# Patient Record
Sex: MALE | Race: WHITE | NOT HISPANIC OR LATINO | Employment: OTHER | URBAN - NONMETROPOLITAN AREA
[De-identification: names, ages, dates, MRNs, and addresses within clinical notes are randomized per-mention and may not be internally consistent; named-entity substitution may affect disease eponyms.]

---

## 2017-01-06 ENCOUNTER — OFFICE VISIT (OUTPATIENT)
Dept: FAMILY MEDICINE | Facility: OTHER | Age: 82
End: 2017-01-06
Payer: COMMERCIAL

## 2017-01-06 VITALS
TEMPERATURE: 96.6 F | HEIGHT: 66 IN | DIASTOLIC BLOOD PRESSURE: 72 MMHG | BODY MASS INDEX: 26.78 KG/M2 | HEART RATE: 83 BPM | SYSTOLIC BLOOD PRESSURE: 112 MMHG | OXYGEN SATURATION: 98 % | WEIGHT: 166.6 LBS

## 2017-01-06 DIAGNOSIS — J01.00 ACUTE NON-RECURRENT MAXILLARY SINUSITIS: Primary | ICD-10-CM

## 2017-01-06 DIAGNOSIS — J43.9 PULMONARY EMPHYSEMA, UNSPECIFIED EMPHYSEMA TYPE (H): ICD-10-CM

## 2017-01-06 DIAGNOSIS — I48.91 ATRIAL FIBRILLATION, UNSPECIFIED TYPE (H): ICD-10-CM

## 2017-01-06 DIAGNOSIS — I48.20 CHRONIC ATRIAL FIBRILLATION (H): Primary | ICD-10-CM

## 2017-01-06 PROCEDURE — 99214 OFFICE O/P EST MOD 30 MIN: CPT | Performed by: INTERNAL MEDICINE

## 2017-01-06 RX ORDER — CEFUROXIME AXETIL 500 MG/1
500 TABLET ORAL 2 TIMES DAILY
Qty: 20 TABLET | Refills: 0 | Status: SHIPPED | OUTPATIENT
Start: 2017-01-06 | End: 2017-08-07

## 2017-01-06 ASSESSMENT — PAIN SCALES - GENERAL: PAINLEVEL: MODERATE PAIN (5)

## 2017-01-06 NOTE — TELEPHONE ENCOUNTER
Diltiazem         Last Written Prescription Date: 11/17/16  Last Fill Quantity: 180, # refills: 0    Last Office Visit with G, P or [facility] prescribing provider:  1/6/17   Future Office Visit:      BP Readings from Last 3 Encounters:   01/06/17 112/72   08/29/16 133/82   05/20/16 112/76     ALT       34   7/25/2006  CHOL      192   5/20/2016  HDL       57   5/20/2016  LDL      112   5/20/2016  TRIG      113   5/20/2016  CHOLHDLRATIO      3.3   3/10/2015

## 2017-01-06 NOTE — NURSING NOTE
"Chief Complaint   Patient presents with     Pharyngitis     3 days       Initial /72 mmHg  Pulse 83  Temp(Src) 96.6  F (35.9  C) (Temporal)  Ht 5' 6\" (1.676 m)  Wt 166 lb 9.6 oz (75.569 kg)  BMI 26.90 kg/m2  SpO2 98% Estimated body mass index is 26.9 kg/(m^2) as calculated from the following:    Height as of this encounter: 5' 6\" (1.676 m).    Weight as of this encounter: 166 lb 9.6 oz (75.569 kg).  BP completed using cuff size: rolando MARROQUIN      "

## 2017-01-06 NOTE — PROGRESS NOTES
SUBJECTIVE:                                                    Jamal Francis is a 84 year old male who presents to clinic today for the following health issues:    RESPIRATORY SYMPTOMS      Duration: 3 days    Description  rhinorrhea, sore throat, cough and hoarse voice    Severity: mild    Accompanying signs and symptoms: None    History (predisposing factors):  none    Precipitating or alleviating factors: None    Therapies tried and outcome:  none       CHIEF COMPLAINT:    The patient is a pleasant 84-year-old gentleman who presents today with frontal and facial discomfort. He's had a sore throat, runny nose, and low-grade fever with hoarse voice for the last 3 days. He has been taking food and fluids adequately. He denies any difficulty sleeping. He has a history of chronic obstructive pulmonary disease which appears to be quite stable. He also has chronic atrial fibrillation and is on anticoagulation allowing several antibiotics. His heart rate is controlled with diltiazem but no syncope or near syncope.                         PAST, FAMILY,SOCIAL HISTORY:     Medical  History:   has a past medical history of Inguinal hernia without mention of obstruction or gangrene, unilateral or unspecified, (not specified as recurrent); Contact dermatitis and other eczema, due to unspecified cause; Varicella without mention of complication; Measles without mention of complication; Mumps without mention of complication; Injury, other and unspecified, unspecified site; Pneumonia in whooping cough(484.3); and Essential hypertension, benign (6/27/2016).     Surgical History:   has past surgical history that includes REPAIR ING HERNIA,5+Y/O,REDUCIBL (1993); cataract iol, rt/lt (7/27/2006); and REMV CATARACT EXTRACAP,INSERT LENS (08/24/06).     Social History:   reports that he has never smoked. He has never used smokeless tobacco. He reports that he drinks alcohol. He reports that he does not use illicit drugs.     Family  History:  family history includes CANCER in his father and paternal grandfather; Respiratory in his brother.            MEDICATIONS  Current Outpatient Prescriptions   Medication Sig Dispense Refill     cefUROXime (CEFTIN) 500 MG tablet Take 1 tablet (500 mg) by mouth 2 times daily 20 tablet 0     warfarin (COUMADIN) 5 MG tablet Take 5 mg on Monday, Wednesday, Friday and 7.5 mg all other days, or as directed by the coumadin clinic. 110 tablet 1     albuterol (PROAIR HFA, PROVENTIL HFA, VENTOLIN HFA) 108 (90 BASE) MCG/ACT inhaler Inhale 2 puffs into the lungs every 6 hours as needed for shortness of breath / dyspnea or wheezing 1 Inhaler 3     diltiazem (CARDIZEM) 30 MG tablet Take 1 tablet (30 mg) by mouth 2 times daily 180 tablet 0         --------------------------------------------------------------------------------------------------------------------                          REVIEW OF SYSTEMS:         HEART: Pt denies: chest pain, sensed arrythmia, syncope, tachy or bradyarrhythmia or excess edema.   LUNGS: Pt denies: cough,excess sputum, hemoptysis, or shortness of breath.   GI: Pt denies: nausea, vomitting, diarrhea, constipation, melena, or hematochezia.   NEURO: Pt denies: seizures, strokes, diplopia, weakness, paraesthesias, or paralysis.                          EXAMINATION:        Vital Signs:  B/P: 112/72, T: 96.6, P: 83, R: Data Unavailable, BMI: Body mass index is 26.9 kg/(m^2).   Constitutional: The patient appears to be in no acute distress. The patient appears to be adequately hydrated. No acute respiratory or hemodynamic distress is noted at this time.   LUNGS: Breath sounds are slightly decreased bilaterally, airflow is brisk, no intercostal retraction or stridor is noted. No coughing is noted during visit.   HEART:  Irregularly irregular without rubs, clicks, gallops. Systolic murmur consistent with mitral regurgitation is noted.. PMI is nondisplaced. Upstrokes are brisk. S1,S2 are heard.   GI:  Abdomen is soft, without rebound, guarding or tenderness. Bowel sounds are appropriate. No renal bruits are heard.    NEURO: Pt is alert and appropriate. No neurologic lateralization is noted. Cranial nerves 2-12 are intact. Peripheral sensory and motor function are grossly normal              ENT: Nasal mucosa is markedly edematous, thick yellow exudates are seen. Incomplete obstruction is present. Pharynx is clear of exudates, no significant cervical adenopathy is present. Tympanic membranes show mild infection without evidence of perforation. Thyroid is not nodular or enlarged. Positive sinus Is noted bilaterally.                        DECISION MAKING:       #1 acute non-recurrent maxillary sinusitis with upper respiratory infection   Recommend Ceftin 500 mg twice daily for 10 days   Avoid decongestants/atrial fibrillation   Tylenol as directed   Fluids  #2 chronic atrial fibrillation   Continue anticoagulation  #3 emphysema Stable   Continue albuterol as needed   Consider long-term therapy/long-term beta agonist with steroid if symptoms worsen    I have carefully explained the diagnosis and treatment options with the patient. The patient has displayed an understanding of the above, and all subsequent questions were answered.                            FOLLOW UP    I have asked the patient to make an appointment for follow up with me in 1 week if not markedly improved.      DO JENN Bentley    Portions of this note were produced using [employer]  Although every attempt at real-time proof reading has been made, occasional grammar/syntax errors may have been missed.

## 2017-01-06 NOTE — MR AVS SNAPSHOT
"              After Visit Summary   2017    Jamal Francis    MRN: [id number]           Patient Information     Date Of Birth          3/18/1932        Visit Information        Provider Department      2017 9:40 AM Catarino Landrum DO [facility]        Today's Diagnoses     Acute non-recurrent maxillary sinusitis    -  1     Atrial fibrillation, unspecified type (H)            Follow-ups after your visit        Who to contact     If you have questions or need follow up information about today's clinic visit or your schedule please contact [facility] directly at [phone].  Normal or non-critical lab and imaging results will be communicated to you by [facility], letter or phone within 4 business days after the clinic has received the results. If you do not hear from us within 7 days, please contact the clinic through [facility] or phone. If you have a critical or abnormal lab result, we will notify you by phone as soon as possible.  Submit refill requests through [employer] or call your pharmacy and they will forward the refill request to us. Please allow 3 business days for your refill to be completed.          Additional Information About Your Visit        MyChart Information     [employer] lets you send messages to your doctor, view your test results, renew your prescriptions, schedule appointments and more. To sign up, go to [email or web address] . Click on \"Log in\" on the left side of the screen, which will take you to the Welcome page. Then click on \"Sign up Now\" on the right side of the page.     You will be asked to enter the access code listed below, as well as some personal information. Please follow the directions to create your username and password.     Your access code is: [email or web address]  Expires: 2017 11:24 AM     Your access code will  in 90 days. If you need help or a new code, please call your [facility] or [phone].        Care EveryWhere ID     " "This is your Care EveryWhere ID. This could be used by other organizations to access your [place] medical records  [id number]        Your Vitals Were     Pulse Temperature Height BMI (Body Mass Index) Pulse Oximetry       83 96.6  F (35.9  C) (Temporal) 5' 6\" (1.676 m) 26.90 kg/m2 98%        Blood Pressure from Last 3 Encounters:   01/06/17 112/72   08/29/16 133/82   05/20/16 112/76    Weight from Last 3 Encounters:   01/06/17 166 lb 9.6 oz (75.569 kg)   05/20/16 165 lb 8 oz (75.07 kg)   07/21/15 169 lb (76.658 kg)              Today, you had the following     No orders found for display         Today's Medication Changes          These changes are accurate as of: 1/6/17 11:24 AM.  If you have any questions, ask your nurse or doctor.               Start taking these medicines.        Dose/Directions    cefUROXime 500 MG tablet   Commonly known as:  CEFTIN   Used for:  Acute non-recurrent maxillary sinusitis   Started by:  Catarino Landrum DO        Dose:  500 mg   Take 1 tablet (500 mg) by mouth 2 times daily   Quantity:  20 tablet   Refills:  0            Where to get your medicines      These medications were sent to [id number] - [place] - [address] [facility] [address]     Phone:  [phone]    - cefUROXime 500 MG tablet             Primary Care Provider Office Phone # Fax #    Catarino Landrum [phone]       [place] [address] [address]  [facility] [address]        Thank you!     Thank you for choosing [facility]  for your care. Our goal is always to provide you with excellent care. Hearing back from our patients is one way we can continue to improve our services. Please take a few minutes to complete the written survey that you may receive in the mail after your visit with us. Thank you!             Your Updated Medication List - Protect others around you: Learn how to safely use, store and throw away your " medicines at www.disposemymeds.org.          This list is accurate as of: 1/6/17 11:24 AM.  Always use your most recent med list.                   Brand Name Dispense Instructions for use    albuterol 108 (90 BASE) MCG/ACT Inhaler    PROAIR HFA/PROVENTIL HFA/VENTOLIN HFA    1 Inhaler    Inhale 2 puffs into the lungs every 6 hours as needed for shortness of breath / dyspnea or wheezing       cefUROXime 500 MG tablet    CEFTIN    20 tablet    Take 1 tablet (500 mg) by mouth 2 times daily       diltiazem 30 MG tablet    CARDIZEM    180 tablet    Take 1 tablet (30 mg) by mouth 2 times daily       warfarin 5 MG tablet    COUMADIN    110 tablet    Take 5 mg on Monday, Wednesday, Friday and 7.5 mg all other days, or as directed by the coumadin clinic.

## 2017-01-09 RX ORDER — DILTIAZEM HYDROCHLORIDE 30 MG/1
30 TABLET, FILM COATED ORAL 2 TIMES DAILY
Qty: 180 TABLET | Refills: 0 | Status: SHIPPED | OUTPATIENT
Start: 2017-01-09 | End: 2017-08-07

## 2017-01-09 NOTE — TELEPHONE ENCOUNTER
Routing refill request to provider for review/approval because:  Labs not current:  WHIT Ceja RN

## 2017-02-03 ENCOUNTER — TELEPHONE (OUTPATIENT)
Dept: FAMILY MEDICINE | Facility: OTHER | Age: 82
End: 2017-02-03

## 2017-02-03 ENCOUNTER — ANTICOAGULATION THERAPY VISIT (OUTPATIENT)
Dept: ANTICOAGULATION | Facility: CLINIC | Age: 82
End: 2017-02-03
Payer: COMMERCIAL

## 2017-02-03 VITALS — HEART RATE: 82 BPM | DIASTOLIC BLOOD PRESSURE: 74 MMHG | SYSTOLIC BLOOD PRESSURE: 129 MMHG

## 2017-02-03 DIAGNOSIS — Z79.01 LONG-TERM (CURRENT) USE OF ANTICOAGULANTS: Primary | ICD-10-CM

## 2017-02-03 DIAGNOSIS — I48.91 ATRIAL FIBRILLATION, UNSPECIFIED TYPE (H): ICD-10-CM

## 2017-02-03 DIAGNOSIS — N30.01 ACUTE CYSTITIS WITH HEMATURIA: Primary | ICD-10-CM

## 2017-02-03 LAB — INR POINT OF CARE: 2.6 (ref 0.86–1.14)

## 2017-02-03 PROCEDURE — 36416 COLLJ CAPILLARY BLOOD SPEC: CPT

## 2017-02-03 PROCEDURE — 99207 ZZC NO CHARGE NURSE ONLY: CPT

## 2017-02-03 PROCEDURE — 85610 PROTHROMBIN TIME: CPT | Mod: QW

## 2017-02-03 NOTE — TELEPHONE ENCOUNTER
Reason for Call: Request for an order or referral:    Order or referral being requested: Pts wife requesting orders for urine for pt (blood in urine).  Was advised Dr SNIDER is out today.    Date needed: as soon as possible    Has the patient been seen by the PCP for this problem? YES    Additional comments:     Phone number Patient can be reached at:  Home number on file [phone] (home)    Best Time:  any    Can we leave a detailed message on this number?  YES    Call taken on 2/3/2017 at 9:23 AM by Elizabeth Beltrán

## 2017-02-03 NOTE — PROGRESS NOTES
ANTICOAGULATION FOLLOW-UP CLINIC VISIT    Patient Name:  Jamal Francis  Date:  2/3/2017  Contact Type:  Face to Face    SUBJECTIVE:     Patient Findings     Positives No Problem Findings           OBJECTIVE    INR PROTIME   Date Value Ref Range Status   02/03/2017 2.6* 0.86 - 1.14 Final       ASSESSMENT / PLAN  INR assessment THER    Recheck INR In: 6 WEEKS    INR Location Clinic      Anticoagulation Summary as of 2/3/2017     INR goal 2.0-3.0   Selected INR 2.6 (2/3/2017)   Maintenance plan 5 mg (5 mg x 1) on Mon, Wed, Fri; 7.5 mg (5 mg x 1.5) all other days   Full instructions 5 mg on Mon, Wed, Fri; 7.5 mg all other days   Weekly total 45 mg   No change documented Livia Jaeger RN   Plan last modified Livia Jaeger, RN (3/30/2016)   Next INR check 3/17/2017   Target end date     Indications   Atrial fibrillation (H) [I48.91]  Long-term (current) use of anticoagulants [Z79.01] [Z79.01]         Anticoagulation Episode Summary     INR check location     Preferred lab     Send INR reminders to [facility]    Comments 5 mg tablets         Anticoagulation Care Providers     Provider Role Specialty Phone number    Krystle Goodwin RN Responsible              See the Encounter Report to view Anticoagulation Flowsheet and Dosing Calendar (Go to Encounters tab in chart review, and find the Anticoagulation Therapy Visit)    Dosage adjustment made based on physician directed care plan.    Livia Jaeger, BRIANA

## 2017-02-07 DIAGNOSIS — N30.01 ACUTE CYSTITIS WITH HEMATURIA: ICD-10-CM

## 2017-02-07 LAB
ALBUMIN UR-MCNC: NEGATIVE MG/DL
APPEARANCE UR: CLEAR
BACTERIA #/AREA URNS HPF: ABNORMAL /HPF
BILIRUB UR QL STRIP: NEGATIVE
COLOR UR AUTO: YELLOW
GLUCOSE UR STRIP-MCNC: NEGATIVE MG/DL
HGB UR QL STRIP: ABNORMAL
KETONES UR STRIP-MCNC: NEGATIVE MG/DL
LEUKOCYTE ESTERASE UR QL STRIP: NEGATIVE
MUCOUS THREADS #/AREA URNS LPF: PRESENT /LPF
NITRATE UR QL: NEGATIVE
NON-SQ EPI CELLS #/AREA URNS LPF: ABNORMAL /LPF
PH UR STRIP: 5.5 PH (ref 5–7)
RBC #/AREA URNS AUTO: ABNORMAL /HPF (ref 0–2)
SP GR UR STRIP: 1.02 (ref 1–1.03)
URN SPEC COLLECT METH UR: ABNORMAL
UROBILINOGEN UR STRIP-ACNC: 0.2 EU/DL (ref 0.2–1)
WBC #/AREA URNS AUTO: ABNORMAL /HPF (ref 0–2)

## 2017-02-07 PROCEDURE — 81001 URINALYSIS AUTO W/SCOPE: CPT | Performed by: INTERNAL MEDICINE

## 2017-03-02 ENCOUNTER — RADIANT APPOINTMENT (OUTPATIENT)
Dept: GENERAL RADIOLOGY | Facility: OTHER | Age: 82
End: 2017-03-02
Attending: FAMILY MEDICINE
Payer: COMMERCIAL

## 2017-03-02 ENCOUNTER — OFFICE VISIT (OUTPATIENT)
Dept: FAMILY MEDICINE | Facility: OTHER | Age: 82
End: 2017-03-02
Payer: COMMERCIAL

## 2017-03-02 VITALS
OXYGEN SATURATION: 99 % | WEIGHT: 168 LBS | SYSTOLIC BLOOD PRESSURE: 126 MMHG | HEART RATE: 88 BPM | BODY MASS INDEX: 27.12 KG/M2 | DIASTOLIC BLOOD PRESSURE: 66 MMHG | TEMPERATURE: 97.4 F | RESPIRATION RATE: 16 BRPM

## 2017-03-02 DIAGNOSIS — M79.621 PAIN OF RIGHT UPPER ARM: Primary | ICD-10-CM

## 2017-03-02 DIAGNOSIS — M79.621 PAIN OF RIGHT UPPER ARM: ICD-10-CM

## 2017-03-02 PROCEDURE — 99213 OFFICE O/P EST LOW 20 MIN: CPT | Performed by: FAMILY MEDICINE

## 2017-03-02 PROCEDURE — 73060 X-RAY EXAM OF HUMERUS: CPT | Mod: RT

## 2017-03-02 ASSESSMENT — PAIN SCALES - GENERAL: PAINLEVEL: MODERATE PAIN (4)

## 2017-03-02 NOTE — PROGRESS NOTES
SUBJECTIVE:                                                    Jamal Francis is a 84 year old male who presents to clinic today for the following health   Blood pressure 126/66, pulse 88, temperature 97.4  F (36.3  C), temperature source Temporal, resp. rate 16, weight 168 lb (76.2 kg), SpO2 99 %.    SUBJECTIVE:  Jamal Francis, a 84 year old male scheduled an appointment to discuss the following issues:  Pain of right upper arm    no obvious injury, but may have strained it lifting. Has improved slightly   On coumadin so cannot take nsaids   Past Medical History   Diagnosis Date     Contact dermatitis and other eczema, due to unspecified cause      Both temples     Essential hypertension, benign 6/27/2016     Inguinal hernia without mention of obstruction or gangrene, unilateral or unspecified, (not specified as recurrent)      Injury, other and unspecified, unspecified site      Rib fracture     Measles without mention of complication      Measles     Mumps without mention of complication      Mumps     Pneumonia in whooping cough(484.3)      Varicella without mention of complication      Chickenpox     Current Outpatient Prescriptions   Medication Sig Dispense Refill     diltiazem (CARDIZEM) 30 MG tablet Take 1 tablet (30 mg) by mouth 2 times daily 180 tablet 0     cefUROXime (CEFTIN) 500 MG tablet Take 1 tablet (500 mg) by mouth 2 times daily 20 tablet 0     warfarin (COUMADIN) 5 MG tablet Take 5 mg on Monday, Wednesday, Friday and 7.5 mg all other days, or as directed by the coumadin clinic. 110 tablet 1     albuterol (PROAIR HFA, PROVENTIL HFA, VENTOLIN HFA) 108 (90 BASE) MCG/ACT inhaler Inhale 2 puffs into the lungs every 6 hours as needed for shortness of breath / dyspnea or wheezing 1 Inhaler 3       EXAM:  /66 (BP Location: Right arm, Patient Position: Chair, Cuff Size: Adult Regular)  Pulse 88  Temp 97.4  F (36.3  C) (Temporal)  Resp 16  Wt 168 lb (76.2 kg)  SpO2 99%  BMI 27.12 kg/m2     HEART: sl  irregular rhythm, no murmurs.    EXTREMITIES: No cyanosis or edema. Peripheral pulses nl.some tenderness in the mid humeral area  NEURO no arm weakness     SKIN:  without rash on arm      IMPRESSION/PLAN:  Encounter Diagnosis   Name Primary?     Pain of right upper arm Yes     Orders Placed This Encounter     XR Humerus Right G/E 2 Views: nl      heat, rest, tylenol. Wd expect gradual improvement  cb if no imp in 2 weeks    Kennedy Villalba

## 2017-03-02 NOTE — MR AVS SNAPSHOT
"              After Visit Summary   3/2/2017    Jamal Francis    MRN: [id number]           Patient Information     Date Of Birth          3/18/1932        Visit Information        Provider Department      3/2/2017 10:00 AM Kennedy Villalba MD [facility]        Today's Diagnoses     Pain of right upper arm    -  1       Follow-ups after your visit        Who to contact     If you have questions or need follow up information about today's clinic visit or your schedule please contact [facility] directly at [phone].  Normal or non-critical lab and imaging results will be communicated to you by [facility] [facility], letter or phone within 4 business days after the clinic has received the results. If you do not hear from us within 7 days, please contact the clinic through [employer] or phone. If you have a critical or abnormal lab result, we will notify you by phone as soon as possible.  Submit refill requests through [employer] or call your pharmacy and they will forward the refill request to us. Please allow 3 business days for your refill to be completed.          Additional Information About Your Visit        MyChart Information     [employer] lets you send messages to your doctor, view your test results, renew your prescriptions, schedule appointments and more. To sign up, go to [email or web address] . Click on \"Log in\" on the left side of the screen, which will take you to the Welcome page. Then click on \"Sign up Now\" on the right side of the page.     You will be asked to enter the access code listed below, as well as some personal information. Please follow the directions to create your username and password.     Your access code is: [email or web address]  Expires: 2017 11:24 AM     Your access code will  in 90 days. If you need help or a new code, please call your [facility] or [phone].        Care EveryWhere ID     This is your Care EveryWhere ID. This could be used by other organizations " to access your [place] medical records  [id number]        Your Vitals Were     Pulse Temperature Respirations Pulse Oximetry BMI (Body Mass Index)       88 97.4  F (36.3  C) (Temporal) 16 99% 27.12 kg/m2        Blood Pressure from Last 3 Encounters:   03/02/17 126/66   02/03/17 129/74   01/06/17 112/72    Weight from Last 3 Encounters:   03/02/17 168 lb (76.2 kg)   01/06/17 166 lb 9.6 oz (75.6 kg)   05/20/16 165 lb 8 oz (75.1 kg)               Primary Care Provider Office Phone # Fax #    Catarino Landrum,  [phone]       [address] [place] [address]        Thank you!     Thank you for choosing [facility]  for your care. Our goal is always to provide you with excellent care. Hearing back from our patients is one way we can continue to improve our services. Please take a few minutes to complete the written survey that you may receive in the mail after your visit with us. Thank you!             Your Updated Medication List - Protect others around you: Learn how to safely use, store and throw away your medicines at www.disposemymeds.org.          This list is accurate as of: 3/2/17 11:38 AM.  Always use your most recent med list.                   Brand Name Dispense Instructions for use    albuterol 108 (90 BASE) MCG/ACT Inhaler    PROAIR HFA/PROVENTIL HFA/VENTOLIN HFA    1 Inhaler    Inhale 2 puffs into the lungs every 6 hours as needed for shortness of breath / dyspnea or wheezing       cefUROXime 500 MG tablet    CEFTIN    20 tablet    Take 1 tablet (500 mg) by mouth 2 times daily       diltiazem 30 MG tablet    CARDIZEM    180 tablet    Take 1 tablet (30 mg) by mouth 2 times daily       warfarin 5 MG tablet    COUMADIN    110 tablet    Take 5 mg on Monday, Wednesday, Friday and 7.5 mg all other days, or as directed by the coumadin clinic.

## 2017-03-21 ENCOUNTER — ANTICOAGULATION THERAPY VISIT (OUTPATIENT)
Dept: ANTICOAGULATION | Facility: CLINIC | Age: 82
End: 2017-03-21
Payer: COMMERCIAL

## 2017-03-21 VITALS — SYSTOLIC BLOOD PRESSURE: 123 MMHG | DIASTOLIC BLOOD PRESSURE: 84 MMHG | HEART RATE: 85 BPM

## 2017-03-21 DIAGNOSIS — I48.91 ATRIAL FIBRILLATION, UNSPECIFIED TYPE (H): ICD-10-CM

## 2017-03-21 DIAGNOSIS — Z79.01 LONG-TERM (CURRENT) USE OF ANTICOAGULANTS: ICD-10-CM

## 2017-03-21 DIAGNOSIS — I48.19 PERSISTENT ATRIAL FIBRILLATION (H): ICD-10-CM

## 2017-03-21 LAB — INR POINT OF CARE: 1.5 (ref 0.86–1.14)

## 2017-03-21 PROCEDURE — 36416 COLLJ CAPILLARY BLOOD SPEC: CPT

## 2017-03-21 PROCEDURE — 85610 PROTHROMBIN TIME: CPT | Mod: QW

## 2017-03-21 PROCEDURE — 99207 ZZC NO CHARGE NURSE ONLY: CPT

## 2017-03-21 RX ORDER — WARFARIN SODIUM 5 MG/1
TABLET ORAL
Qty: 110 TABLET | Refills: 1 | Status: SHIPPED | OUTPATIENT
Start: 2017-03-21 | End: 2017-09-01

## 2017-03-21 NOTE — MR AVS SNAPSHOT
Jamal AGATHA Francis   3/21/2017 3:00 PM   Anticoagulation Therapy Visit    Description:  85 year old male   Provider:  PH ANTI COAG   Department:  [facility]           INR as of 3/21/2017     Today's INR 1.5!      Anticoagulation Summary as of 3/21/2017     INR goal 2.0-3.0   Today's INR 1.5!   Full instructions 3/21: 10 mg; Otherwise 5 mg on Mon, Wed, Fri; 7.5 mg all other days   Next INR check 4/4/2017    Indications   Atrial fibrillation (H) [I48.91]  Long-term (current) use of anticoagulants [Z79.01] [Z79.01]         Contact Numbers     Clinic Number:         March 2017 Details    Sun Mon Tue Wed Thu Fri Sat        1               2               3               4                 5               6               7               8               9               10               11                 12               13               14               15               16               17               18                 19               20               21      10 mg   See details      22      5 mg         23      7.5 mg         24      5 mg         25      7.5 mg           26      7.5 mg         27      5 mg         28      7.5 mg         29      5 mg         30      7.5 mg         31      5 mg           Date Details   03/21 This INR check               How to take your warfarin dose     To take:  5 mg Take 1 of the 5 mg tablets.    To take:  7.5 mg Take 1.5 of the 5 mg tablets.    To take:  10 mg Take 2 of the 5 mg tablets.           April 2017 Details    Sun Mon Tue Wed Thu Fri Sat           1      7.5 mg           2      7.5 mg         3      5 mg         4            5               6               7               8                 9               10               11               12               13               14               15                 16               17               18               19               20               21               22                 23               24               25                26               27               28               29                 30                      Date Details   No additional details    Date of next INR:  4/4/2017         How to take your warfarin dose     To take:  5 mg Take 1 of the 5 mg tablets.    To take:  7.5 mg Take 1.5 of the 5 mg tablets.

## 2017-03-21 NOTE — PROGRESS NOTES
ANTICOAGULATION FOLLOW-UP CLINIC VISIT    Patient Name:  Jamal Francis  Date:  3/21/2017  Contact Type:  Face to Face    SUBJECTIVE:     Patient Findings     Positives Unexplained INR or factor level change           OBJECTIVE    INR Protime   Date Value Ref Range Status   03/21/2017 1.5 (A) 0.86 - 1.14 Final       ASSESSMENT / PLAN  INR assessment SUB    Recheck INR In: 2 WEEKS    INR Location Clinic      Anticoagulation Summary as of 3/21/2017     INR goal 2.0-3.0   Today's INR 1.5!   Maintenance plan 5 mg (5 mg x 1) on Mon, Wed, Fri; 7.5 mg (5 mg x 1.5) all other days   Full instructions 3/21: 10 mg; Otherwise 5 mg on Mon, Wed, Fri; 7.5 mg all other days   Weekly total 45 mg   Plan last modified Livia Jaeger RN (3/30/2016)   Next INR check 4/4/2017   Target end date     Indications   Atrial fibrillation (H) [I48.91]  Long-term (current) use of anticoagulants [Z79.01] [Z79.01]         Anticoagulation Episode Summary     INR check location     Preferred lab     Send INR reminders to [facility] POOL    Comments 5 mg tablets         Anticoagulation Care Providers     Provider Role Specialty Phone number    Krystle Goodwin RN Responsible              See the Encounter Report to view Anticoagulation Flowsheet and Dosing Calendar (Go to Encounters tab in chart review, and find the Anticoagulation Therapy Visit)    Dosage adjustment made based on physician directed care plan.    10 mg x1 then resume     Livia Jaeger, BRIANA

## 2017-04-07 ENCOUNTER — ANTICOAGULATION THERAPY VISIT (OUTPATIENT)
Dept: ANTICOAGULATION | Facility: CLINIC | Age: 82
End: 2017-04-07
Payer: COMMERCIAL

## 2017-04-07 VITALS — DIASTOLIC BLOOD PRESSURE: 82 MMHG | SYSTOLIC BLOOD PRESSURE: 115 MMHG | HEART RATE: 81 BPM

## 2017-04-07 DIAGNOSIS — I48.91 ATRIAL FIBRILLATION, UNSPECIFIED TYPE (H): ICD-10-CM

## 2017-04-07 DIAGNOSIS — Z79.01 LONG-TERM (CURRENT) USE OF ANTICOAGULANTS: ICD-10-CM

## 2017-04-07 LAB — INR POINT OF CARE: 2.9 (ref 0.86–1.14)

## 2017-04-07 PROCEDURE — 85610 PROTHROMBIN TIME: CPT | Mod: QW

## 2017-04-07 PROCEDURE — 36416 COLLJ CAPILLARY BLOOD SPEC: CPT

## 2017-04-07 PROCEDURE — 99207 ZZC NO CHARGE NURSE ONLY: CPT

## 2017-04-07 NOTE — PROGRESS NOTES
ANTICOAGULATION FOLLOW-UP CLINIC VISIT    Patient Name:  Jamal Francis  Date:  4/7/2017  Contact Type:  Face to Face    SUBJECTIVE:     Patient Findings     Positives No Problem Findings           OBJECTIVE    INR Protime   Date Value Ref Range Status   04/07/2017 2.9 (A) 0.86 - 1.14 Final       ASSESSMENT / PLAN  INR assessment THER    Recheck INR In: 6 WEEKS    INR Location Clinic      Anticoagulation Summary as of 4/7/2017     INR goal 2.0-3.0   Today's INR 2.9   Maintenance plan 5 mg (5 mg x 1) on Mon, Wed, Fri; 7.5 mg (5 mg x 1.5) all other days   Full instructions 5 mg on Mon, Wed, Fri; 7.5 mg all other days   Weekly total 45 mg   No change documented Livia Jaeger RN   Plan last modified Livia Jaeger RN (3/30/2016)   Next INR check 5/19/2017   Target end date     Indications   Atrial fibrillation (H) [I48.91]  Long-term (current) use of anticoagulants [Z79.01] [Z79.01]         Anticoagulation Episode Summary     INR check location     Preferred lab     Send INR reminders to [facility]    Comments 5 mg tablets         Anticoagulation Care Providers     Provider Role Specialty Phone number    Krystle Goodwin RN Responsible              See the Encounter Report to view Anticoagulation Flowsheet and Dosing Calendar (Go to Encounters tab in chart review, and find the Anticoagulation Therapy Visit)    Dosage adjustment made based on physician directed care plan.    Will call to make an appt in 6 weeks    Livia Jaeger, BRIANA

## 2017-04-07 NOTE — MR AVS SNAPSHOT
Jamal AGATHA Francis   4/7/2017 1:00 PM   Anticoagulation Therapy Visit    Description:  85 year old male   Provider:  [facility]   Department:  [facility]           INR as of 4/7/2017     Today's INR 2.9      Anticoagulation Summary as of 4/7/2017     INR goal 2.0-3.0   Today's INR 2.9   Full instructions 5 mg on Mon, Wed, Fri; 7.5 mg all other days   Next INR check 5/19/2017    Indications   Atrial fibrillation (H) [I48.91]  Long-term (current) use of anticoagulants [Z79.01] [Z79.01]         Contact Numbers     Clinic Number:         April 2017 Details    Sun Mon Tue Wed Thu Fri Sat           1                 2               3               4               5               6               7      5 mg   See details      8      7.5 mg           9      7.5 mg         10      5 mg         11      7.5 mg         12      5 mg         13      7.5 mg         14      5 mg         15      7.5 mg           16      7.5 mg         17      5 mg         18      7.5 mg         19      5 mg         20      7.5 mg         21      5 mg         22      7.5 mg           23      7.5 mg         24      5 mg         25      7.5 mg         26      5 mg         27      7.5 mg         28      5 mg         29      7.5 mg           30      7.5 mg                Date Details   04/07 This INR check               How to take your warfarin dose     To take:  5 mg Take 1 of the 5 mg tablets.    To take:  7.5 mg Take 1.5 of the 5 mg tablets.           May 2017 Details    Sun Mon Tue Wed Thu Fri Sat      1      5 mg         2      7.5 mg         3      5 mg         4      7.5 mg         5      5 mg         6      7.5 mg           7      7.5 mg         8      5 mg         9      7.5 mg         10      5 mg         11      7.5 mg         12      5 mg         13      7.5 mg           14      7.5 mg         15      5 mg         16      7.5 mg         17      5 mg         18      7.5 mg         19            20                 21               22                23               24               25               26               27                 28               29               30               31                   Date Details   No additional details    Date of next INR:  5/19/2017         How to take your warfarin dose     To take:  5 mg Take 1 of the 5 mg tablets.    To take:  7.5 mg Take 1.5 of the 5 mg tablets.

## 2017-05-19 ENCOUNTER — ANTICOAGULATION THERAPY VISIT (OUTPATIENT)
Dept: ANTICOAGULATION | Facility: CLINIC | Age: 82
End: 2017-05-19
Payer: COMMERCIAL

## 2017-05-19 VITALS — SYSTOLIC BLOOD PRESSURE: 143 MMHG | HEART RATE: 73 BPM | DIASTOLIC BLOOD PRESSURE: 80 MMHG

## 2017-05-19 DIAGNOSIS — I48.91 ATRIAL FIBRILLATION, UNSPECIFIED TYPE (H): ICD-10-CM

## 2017-05-19 DIAGNOSIS — Z79.01 LONG-TERM (CURRENT) USE OF ANTICOAGULANTS: ICD-10-CM

## 2017-05-19 DIAGNOSIS — I48.20 CHRONIC ATRIAL FIBRILLATION (H): ICD-10-CM

## 2017-05-19 LAB
ALT SERPL W P-5'-P-CCNC: 29 U/L (ref 0–70)
INR POINT OF CARE: 3.1 (ref 0.86–1.14)
INR POINT OF CARE: 3.1 (ref 0.86–1.14)

## 2017-05-19 PROCEDURE — 36416 COLLJ CAPILLARY BLOOD SPEC: CPT

## 2017-05-19 PROCEDURE — 85610 PROTHROMBIN TIME: CPT | Mod: QW

## 2017-05-19 PROCEDURE — 99207 ZZC NO CHARGE NURSE ONLY: CPT

## 2017-05-19 PROCEDURE — 84460 ALANINE AMINO (ALT) (SGPT): CPT | Performed by: INTERNAL MEDICINE

## 2017-05-19 NOTE — MR AVS SNAPSHOT
Jamal Francis   5/19/2017 1:30 PM   Anticoagulation Therapy Visit    Description:  85 year old male   Provider:  [facility]   Department:  [facility]           INR as of 5/19/2017     Today's INR 3.1!      Anticoagulation Summary as of 5/19/2017     INR goal 2.0-3.0   Today's INR 3.1!   Full instructions 5 mg on Mon, Wed, Fri; 7.5 mg all other days   Next INR check 6/30/2017    Indications   Atrial fibrillation (H) [I48.91]  Long-term (current) use of anticoagulants [Z79.01] [Z79.01]         Contact Numbers     Clinic Number:         May 2017 Details    Sun Mon Tue Wed Thu Fri Sat      1               2               3               4               5               6                 7               8               9               10               11               12               13                 14               15               16               17               18               19      5 mg   See details      20      7.5 mg           21      7.5 mg         22      5 mg         23      7.5 mg         24      5 mg         25      7.5 mg         26      5 mg         27      7.5 mg           28      7.5 mg         29      5 mg         30      7.5 mg         31      5 mg             Date Details   05/19 This INR check               How to take your warfarin dose     To take:  5 mg Take 1 of the 5 mg tablets.    To take:  7.5 mg Take 1.5 of the 5 mg tablets.           June 2017 Details    Sun Mon Tue Wed Thu Fri Sat         1      7.5 mg         2      5 mg         3      7.5 mg           4      7.5 mg         5      5 mg         6      7.5 mg         7      5 mg         8      7.5 mg         9      5 mg         10      7.5 mg           11      7.5 mg         12      5 mg         13      7.5 mg         14      5 mg         15      7.5 mg         16      5 mg         17      7.5 mg           18      7.5 mg         19      5 mg         20      7.5 mg         21      5 mg         22      7.5 mg         23      5  mg         24      7.5 mg           25      7.5 mg         26      5 mg         27      7.5 mg         28      5 mg         29      7.5 mg         30              Date Details   No additional details    Date of next INR:  6/30/2017         How to take your warfarin dose     To take:  5 mg Take 1 of the 5 mg tablets.    To take:  7.5 mg Take 1.5 of the 5 mg tablets.

## 2017-05-19 NOTE — PROGRESS NOTES
ANTICOAGULATION FOLLOW-UP CLINIC VISIT    Patient Name:  Jamal Francis  Date:  5/19/2017  Contact Type:  Face to Face    SUBJECTIVE:     Patient Findings     Positives No Problem Findings           OBJECTIVE    INR Protime   Date Value Ref Range Status   05/19/2017 3.1 (A) 0.86 - 1.14 Final       ASSESSMENT / PLAN  INR assessment THER    Recheck INR In: 6 WEEKS    INR Location Clinic      Anticoagulation Summary as of 5/19/2017     INR goal 2.0-3.0   Today's INR 3.1!   Maintenance plan 5 mg (5 mg x 1) on Mon, Wed, Fri; 7.5 mg (5 mg x 1.5) all other days   Full instructions 5 mg on Mon, Wed, Fri; 7.5 mg all other days   Weekly total 45 mg   No change documented Livia Jaeger RN   Plan last modified Livia Jaeger RN (3/30/2016)   Next INR check 6/30/2017   Target end date     Indications   Atrial fibrillation (H) [I48.91]  Long-term (current) use of anticoagulants [Z79.01] [Z79.01]         Anticoagulation Episode Summary     INR check location     Preferred lab     Send INR reminders to [facility]    Comments 5 mg tablets         Anticoagulation Care Providers     Provider Role Specialty Phone number    Krystle Goodwin RN Responsible              See the Encounter Report to view Anticoagulation Flowsheet and Dosing Calendar (Go to Encounters tab in chart review, and find the Anticoagulation Therapy Visit)    Dosage adjustment made based on physician directed care plan.    Livia Jaeger, BRIANA

## 2017-05-22 NOTE — PROGRESS NOTES
Please contact the patient and notify him of the following:  Liver test is normal.    Thank you.  DO JENN Bentley

## 2017-06-30 ENCOUNTER — TELEPHONE (OUTPATIENT)
Dept: ANTICOAGULATION | Facility: CLINIC | Age: 82
End: 2017-06-30

## 2017-06-30 ENCOUNTER — ANTICOAGULATION THERAPY VISIT (OUTPATIENT)
Dept: ANTICOAGULATION | Facility: CLINIC | Age: 82
End: 2017-06-30
Payer: COMMERCIAL

## 2017-06-30 VITALS — HEART RATE: 81 BPM | SYSTOLIC BLOOD PRESSURE: 132 MMHG | DIASTOLIC BLOOD PRESSURE: 66 MMHG

## 2017-06-30 DIAGNOSIS — Z79.01 LONG-TERM (CURRENT) USE OF ANTICOAGULANTS: ICD-10-CM

## 2017-06-30 DIAGNOSIS — I48.20 CHRONIC ATRIAL FIBRILLATION (H): Primary | ICD-10-CM

## 2017-06-30 DIAGNOSIS — I48.91 ATRIAL FIBRILLATION, UNSPECIFIED TYPE (H): ICD-10-CM

## 2017-06-30 LAB — INR POINT OF CARE: 2.6 (ref 0.86–1.14)

## 2017-06-30 PROCEDURE — 99207 ZZC NO CHARGE NURSE ONLY: CPT

## 2017-06-30 PROCEDURE — 36416 COLLJ CAPILLARY BLOOD SPEC: CPT

## 2017-06-30 PROCEDURE — 85610 PROTHROMBIN TIME: CPT | Mod: QW

## 2017-06-30 NOTE — PROGRESS NOTES
ANTICOAGULATION FOLLOW-UP CLINIC VISIT    Patient Name:  Jamal Francis  Date:  6/30/2017  Contact Type:  Face to Face    SUBJECTIVE:     Patient Findings     Positives No Problem Findings           OBJECTIVE    INR Protime   Date Value Ref Range Status   06/30/2017 2.6 (A) 0.86 - 1.14 Final       ASSESSMENT / PLAN  INR assessment THER    Recheck INR In: 6 WEEKS    INR Location Clinic      Anticoagulation Summary as of 6/30/2017     INR goal 2.0-3.0   Today's INR 2.6   Maintenance plan 5 mg (5 mg x 1) on Mon, Wed, Fri; 7.5 mg (5 mg x 1.5) all other days   Full instructions 5 mg on Mon, Wed, Fri; 7.5 mg all other days   Weekly total 45 mg   No change documented Livia Cassidy RN   Plan last modified Livia Cassidy RN (3/30/2016)   Next INR check 8/4/2017   Target end date     Indications   Atrial fibrillation (H) [I48.91]  Long-term (current) use of anticoagulants [Z79.01] [Z79.01]         Anticoagulation Episode Summary     INR check location     Preferred lab     Send INR reminders to [facility]    Comments 5 mg tablets         Anticoagulation Care Providers     Provider Role Specialty Phone number    Krystle Goodwin RN Responsible              See the Encounter Report to view Anticoagulation Flowsheet and Dosing Calendar (Go to Encounters tab in chart review, and find the Anticoagulation Therapy Visit)    Dosage adjustment made based on physician directed care plan.        Livia Cassidy, BRIANA

## 2017-06-30 NOTE — MR AVS SNAPSHOT
Jamal AGATHA Francis   6/30/2017 1:00 PM   Anticoagulation Therapy Visit    Description:  85 year old male   Provider:  [facility]   Department:  [facility]           INR as of 6/30/2017     Today's INR 2.6      Anticoagulation Summary as of 6/30/2017     INR goal 2.0-3.0   Today's INR 2.6   Full instructions 5 mg on Mon, Wed, Fri; 7.5 mg all other days   Next INR check 8/4/2017    Indications   Atrial fibrillation (H) [I48.91]  Long-term (current) use of anticoagulants [Z79.01] [Z79.01]         Contact Numbers     Clinic Number:         June 2017 Details    Sun Mon Tue Wed Thu Fri Sat         1               2               3                 4               5               6               7               8               9               10                 11               12               13               14               15               16               17                 18               19               20               21               22               23               24                 25               26               27               28               29               30      5 mg   See details        Date Details   06/30 This INR check               How to take your warfarin dose     To take:  5 mg Take 1 of the 5 mg tablets.           July 2017 Details    Sun Mon Tue Wed Thu Fri Sat           1      7.5 mg           2      7.5 mg         3      5 mg         4      7.5 mg         5      5 mg         6      7.5 mg         7      5 mg         8      7.5 mg           9      7.5 mg         10      5 mg         11      7.5 mg         12      5 mg         13      7.5 mg         14      5 mg         15      7.5 mg           16      7.5 mg         17      5 mg         18      7.5 mg         19      5 mg         20      7.5 mg         21      5 mg         22      7.5 mg           23      7.5 mg         24      5 mg         25      7.5 mg         26      5 mg         27      7.5 mg         28      5 mg         29       7.5 mg           30      7.5 mg         31      5 mg               Date Details   No additional details            How to take your warfarin dose     To take:  5 mg Take 1 of the 5 mg tablets.    To take:  7.5 mg Take 1.5 of the 5 mg tablets.           August 2017 Details    Sun Mon Tue Wed Thu Fri Sat       1      7.5 mg         2      5 mg         3      7.5 mg         4            5                 6               7               8               9               10               11               12                 13               14               15               16               17               18               19                 20               21               22               23               24               25               26                 27               28               29               30               31                  Date Details   No additional details    Date of next INR:  8/4/2017         How to take your warfarin dose     To take:  5 mg Take 1 of the 5 mg tablets.    To take:  7.5 mg Take 1.5 of the 5 mg tablets.

## 2017-06-30 NOTE — TELEPHONE ENCOUNTER
Please review and renew this patients INR referral, orders pending. Thank you!      Livia Cassidy RN

## 2017-08-07 ENCOUNTER — TELEPHONE (OUTPATIENT)
Dept: FAMILY MEDICINE | Facility: CLINIC | Age: 82
End: 2017-08-07

## 2017-08-07 ENCOUNTER — OFFICE VISIT (OUTPATIENT)
Dept: INTERNAL MEDICINE | Facility: CLINIC | Age: 82
End: 2017-08-07
Payer: COMMERCIAL

## 2017-08-07 VITALS
BODY MASS INDEX: 27.12 KG/M2 | OXYGEN SATURATION: 97 % | DIASTOLIC BLOOD PRESSURE: 52 MMHG | HEART RATE: 68 BPM | WEIGHT: 168 LBS | TEMPERATURE: 97 F | SYSTOLIC BLOOD PRESSURE: 82 MMHG

## 2017-08-07 DIAGNOSIS — I48.20 CHRONIC ATRIAL FIBRILLATION (H): ICD-10-CM

## 2017-08-07 DIAGNOSIS — I95.1 ORTHOSTATIC HYPOTENSION: ICD-10-CM

## 2017-08-07 DIAGNOSIS — J44.1 COPD EXACERBATION (H): Primary | ICD-10-CM

## 2017-08-07 PROCEDURE — 99214 OFFICE O/P EST MOD 30 MIN: CPT | Performed by: INTERNAL MEDICINE

## 2017-08-07 RX ORDER — DILTIAZEM HYDROCHLORIDE 30 MG/1
15 TABLET, FILM COATED ORAL 2 TIMES DAILY
Qty: 180 TABLET | Refills: 0 | COMMUNITY
Start: 2017-08-07 | End: 2017-09-01

## 2017-08-07 RX ORDER — IPRATROPIUM BROMIDE AND ALBUTEROL SULFATE 2.5; .5 MG/3ML; MG/3ML
1 SOLUTION RESPIRATORY (INHALATION) EVERY 6 HOURS PRN
Qty: 90 VIAL | Refills: 1 | Status: SHIPPED | OUTPATIENT
Start: 2017-08-07

## 2017-08-07 RX ORDER — CIPROFLOXACIN 500 MG/1
500 TABLET, FILM COATED ORAL 2 TIMES DAILY
Qty: 14 TABLET | Refills: 0 | Status: SHIPPED | OUTPATIENT
Start: 2017-08-07 | End: 2017-08-30

## 2017-08-07 ASSESSMENT — PAIN SCALES - GENERAL: PAINLEVEL: NO PAIN (0)

## 2017-08-07 NOTE — PROGRESS NOTES
SUBJECTIVE:                                                    Jamal Francis is a 85 year old male who presents to clinic today for the following health issues:    RESPIRATORY SYMPTOMS      Duration: last night    Description  rhinorrhea and cough    Severity: moderate    Accompanying signs and symptoms: None    History (predisposing factors):  COPD    Precipitating or alleviating factors: None    Therapies tried and outcome:  none        CHIEF COMPLAINT:    The patient is a pleasant 85-year-old gentleman who has a history of chronic atrial fibrillation. He is on a low-dose of diltiazem for rate control as well as anticoagulation. He notes that recently he has been having some lightheadedness and he stands up. His blood pressure today is systolically in the 80s. He's had no chest pain. He does have some increased shortness of breath as well as a slight cough which is productive of slight yellow sputum. He notes he has no orthopnea or signs of congestive heart failure. He does have a history of chronic obstructive pulmonary disease is currently having a slight exacerbation. He denies any hemoptysis.                         PAST, FAMILY,SOCIAL HISTORY:     Medical  History:   has a past medical history of Contact dermatitis and other eczema, due to unspecified cause; Essential hypertension, benign (6/27/2016); Inguinal hernia without mention of obstruction or gangrene, unilateral or unspecified, (not specified as recurrent); Injury, other and unspecified, unspecified site; Measles without mention of complication; Mumps without mention of complication; Pneumonia in whoop cough; and Varicella without mention of complication.     Surgical History:   has a past surgical history that includes REPAIR ING HERNIA,5+Y/O,REDUCIBL (1993); cataract iol, rt/lt (7/27/2006); and REMV CATARACT EXTRACAP,INSERT LENS (08/24/06).     Social History:   reports that he has never smoked. He has never used smokeless tobacco. He reports  that he drinks alcohol. He reports that he does not use illicit drugs.     Family History:  family history includes CANCER in his father and paternal grandfather; Respiratory in his brother.            MEDICATIONS  Current Outpatient Prescriptions   Medication Sig Dispense Refill     diltiazem (CARDIZEM) 30 MG tablet Take 0.5 tablets (15 mg) by mouth 2 times daily 180 tablet 0     order for DME Nebulizer machine and tubing/cup 1 Device 0     ipratropium - albuterol 0.5 mg/2.5 mg/3 mL (DUONEB) 0.5-2.5 (3) MG/3ML neb solution Take 1 vial (3 mLs) by nebulization every 6 hours as needed for shortness of breath / dyspnea or wheezing 90 vial 1     ciprofloxacin (CIPRO) 500 MG tablet Take 1 tablet (500 mg) by mouth 2 times daily 14 tablet 0     warfarin (COUMADIN) 5 MG tablet Take 5 mg on Monday, Wednesday, Friday and 7.5 mg all other days, or as directed by the coumadin clinic. 110 tablet 1     albuterol (PROAIR HFA, PROVENTIL HFA, VENTOLIN HFA) 108 (90 BASE) MCG/ACT inhaler Inhale 2 puffs into the lungs every 6 hours as needed for shortness of breath / dyspnea or wheezing 1 Inhaler 3     [DISCONTINUED] diltiazem (CARDIZEM) 30 MG tablet Take 1 tablet (30 mg) by mouth 2 times daily 180 tablet 0         --------------------------------------------------------------------------------------------------------------------                          REVIEW OF SYSTEMS:         LUNGS: Pt denies: hemoptysis, or shortness of breath at rest. He does have dyspnea with exertion..   HEART: Pt denies: chest pain, arrythmia, syncope, tachy or bradyarrhythmia or excess edema. Has had some lightheadedness and near syncope upon standing.   GI: Pt denies: nausea, vomitting, diarrhea, constipation, melena, or hematochezia.   NEURO: Pt denies: seizures, strokes, diplopia, weakness, paraesthesias, or paralysis.   SKIN: Pt denies: itching, rashes, discoloration, or specific lesions of concern. Denies recent hair loss.                           EXAMINATION:         BP (!) 82/52 (BP Location: Right arm, Patient Position: Chair, Cuff Size: Adult Regular)  Pulse 68  Temp 97  F (36.1  C) (Temporal)  Wt 168 lb (76.2 kg)  SpO2 97%  BMI 27.12 kg/m2   LUNGS: Decreased breath sounds bilaterally with scattered rhonchi are noted bilaterally, airflow is slowed, no intercostal retraction or stridor is noted. No coughing is noted during visit.   HEART:  Irregularly irregular without rubs, clicks, gallops, or murmurs. PMI is nondisplaced. Upstrokes are brisk. S1,S2 are heard.   GI: Abdomen is soft, without rebound, guarding or tenderness. Bowel sounds are appropriate. No renal bruits are heard.    NEURO: Pt is alert and appropriate. No neurologic lateralization is noted. Cranial nerves 2-12 are intact. Peripheral sensory and motor function are grossly normal   SKIN:  warm and dry. No erythema, or rashes are noted. No specific lesions of concern are noted.                 ENT: Nasal mucosa is edematous, yellow exudates are seen. Incomplete obstruction is present. Pharynx is clear of exudates, no significant cervical adenopathy is present. Tympanic membranes show no infection or evidence of perforation. Thyroid is not nodular or enlarged.                        DECISION MAKIN. Chronic atrial fibrillation (H)  Decrease diltiazem to 15 mg twice daily  - diltiazem (CARDIZEM) 30 MG tablet; Take 0.5 tablets (15 mg) by mouth 2 times daily  Dispense: 180 tablet; Refill: 0    2. COPD exacerbation (H)  Start DuoNeb  An antibiotic/Cipro/follow INR closely  - order for DME; Nebulizer machine and tubing/cup  Dispense: 1 Device; Refill: 0  - ipratropium - albuterol 0.5 mg/2.5 mg/3 mL (DUONEB) 0.5-2.5 (3) MG/3ML neb solution; Take 1 vial (3 mLs) by nebulization every 6 hours as needed for shortness of breath / dyspnea or wheezing  Dispense: 90 vial; Refill: 1  - ciprofloxacin (CIPRO) 500 MG tablet; Take 1 tablet (500 mg) by mouth 2 times daily  Dispense: 14 tablet;  Refill: 0    3. Orthostatic hypotension  As above  Follow blood pressure at home                               FOLLOW UP    I have asked the patient to make an appointment for follow up with me in 2 or 3 weeks for follow-up/physical            I have carefully explained the diagnosis and treatment options with the patient. The patient has displayed an understanding of the above, and all subsequent questions were answered.             DO JENN Bentley    Portions of this note were produced using [employer]  Although every attempt at real-time proof reading has been made, occasional grammar/syntax errors may have been missed.

## 2017-08-07 NOTE — NURSING NOTE
"Chief Complaint   Patient presents with     Cough       Initial BP (!) 82/52 (BP Location: Right arm, Patient Position: Chair, Cuff Size: Adult Regular)  Pulse 68  Temp 97  F (36.1  C) (Temporal)  Wt 168 lb (76.2 kg)  SpO2 97%  BMI 27.12 kg/m2 Estimated body mass index is 27.12 kg/(m^2) as calculated from the following:    Height as of 1/6/17: 5' 6\" (1.676 m).    Weight as of this encounter: 168 lb (76.2 kg).  Medication Reconciliation: complete  Blanca MARROQUIN    "

## 2017-08-07 NOTE — MR AVS SNAPSHOT
"              After Visit Summary   2017    Jamal Francis    MRN: [id number]           Patient Information     Date Of Birth          3/18/1932        Visit Information        Provider Department      2017 1:20 PM Catarino Landrum DO [facility]        Today's Diagnoses     COPD exacerbation (H)    -  1    Chronic atrial fibrillation (H)        Orthostatic hypotension           Follow-ups after your visit        Who to contact     If you have questions or need follow up information about today's clinic visit or your schedule please contact [facility] directly at [phone].  Normal or non-critical lab and imaging results will be communicated to you by [employer], letter or phone within 4 business days after the clinic has received the results. If you do not hear from us within 7 days, please contact the clinic through [employer] or phone. If you have a critical or abnormal lab result, we will notify you by phone as soon as possible.  Submit refill requests through [employer] or call your pharmacy and they will forward the refill request to us. Please allow 3 business days for your refill to be completed.          Additional Information About Your Visit        MyChart Information     Native lets you send messages to your doctor, view your test results, renew your prescriptions, schedule appointments and more. To sign up, go to [email or web address] . Click on \"Log in\" on the left side of the screen, which will take you to the Welcome page. Then click on \"Sign up Now\" on the right side of the page.     You will be asked to enter the access code listed below, as well as some personal information. Please follow the directions to create your username and password.     Your access code is: [email or web address]  Expires: 2017  1:35 PM     Your access code will  in 90 days. If you need help or a new code, please call your [facility] or [phone].        Care " EveryWhere ID     This is your Care EveryWhere ID. This could be used by other organizations to access your [place] medical records  [id number]        Your Vitals Were     Pulse Temperature Pulse Oximetry BMI (Body Mass Index)          68 97  F (36.1  C) (Temporal) 97% 27.12 kg/m2         Blood Pressure from Last 3 Encounters:   08/07/17 (!) 82/52   06/30/17 132/66   05/19/17 143/80    Weight from Last 3 Encounters:   08/07/17 168 lb (76.2 kg)   03/02/17 168 lb (76.2 kg)   01/06/17 166 lb 9.6 oz (75.6 kg)              Today, you had the following     No orders found for display         Today's Medication Changes          These changes are accurate as of: 8/7/17  1:35 PM.  If you have any questions, ask your nurse or doctor.               Start taking these medicines.        Dose/Directions    ciprofloxacin 500 MG tablet   Commonly known as:  CIPRO   Used for:  COPD exacerbation (H)   Started by:  Catarino Landrum DO        Dose:  500 mg   Take 1 tablet (500 mg) by mouth 2 times daily   Quantity:  14 tablet   Refills:  0       ipratropium - albuterol 0.5 mg/2.5 mg/3 mL 0.5-2.5 (3) MG/3ML neb solution   Commonly known as:  DUONEB   Used for:  COPD exacerbation (H)   Started by:  Catarino Landrum DO        Dose:  1 vial   Take 1 vial (3 mLs) by nebulization every 6 hours as needed for shortness of breath / dyspnea or wheezing   Quantity:  90 vial   Refills:  1       order for DME   Used for:  COPD exacerbation (H)   Started by:  Catarino Landrum DO        Nebulizer machine and tubing/cup   Quantity:  1 Device   Refills:  0         These medicines have changed or have updated prescriptions.        Dose/Directions    CARDIZEM 30 MG tablet   This may have changed:  how much to take   Used for:  Chronic atrial fibrillation (H)   Generic drug:  diltiazem   Changed by:  Catarino Landrum DO        Dose:  15 mg   Take 0.5 tablets (15 mg) by mouth 2 times daily   Quantity:  180  tablet   Refills:  0            Where to get your medicines      These medications were sent to [id number] - [place], [place] - [address]   [address], [facility] [address]     Phone:  [phone]     ciprofloxacin 500 MG tablet    ipratropium - albuterol 0.5 mg/2.5 mg/3 mL 0.5-2.5 (3) MG/3ML neb solution         Some of these will need a paper prescription and others can be bought over the counter.  Ask your nurse if you have questions.     Bring a paper prescription for each of these medications     order for DME                Primary Care Provider Office Phone # Fax #    Catarino Lawrence Lucita,  [phone]       [facility] [address]   [place] [facility] [place] [address]        Equal Access to Services     ERNESTO GAMBINO : Hadii bhavin aparicio hadasho Soomaali, waaxda luqadaha, qaybta kaalmada adeegyada, tiesha avendano . So [facility] [phone].    ATENCIÓN: Si habla español, tiene a singleton disposición servicios gratuitos de asistencia lingüística. Llame al [phone].    We comply with applicable federal civil rights laws and [place] laws. We do not discriminate on the basis of race, color, national origin, age, disability sex, sexual orientation or gender identity.            Thank you!     Thank you for choosing [facility]  for your care. Our goal is always to provide you with excellent care. Hearing back from our patients is one way we can continue to improve our services. Please take a few minutes to complete the written survey that you may receive in the mail after your visit with us. Thank you!             Your Updated Medication List - Protect others around you: Learn how to safely use, store and throw away your medicines at www.disposemymeds.org.          This list is accurate as of: 8/7/17  1:35 PM.  Always use your most recent med list.                   Brand Name Dispense Instructions for use Diagnosis    albuterol 108 (90 BASE)  MCG/ACT Inhaler    PROAIR HFA/PROVENTIL HFA/VENTOLIN HFA    1 Inhaler    Inhale 2 puffs into the lungs every 6 hours as needed for shortness of breath / dyspnea or wheezing    Pulmonary emphysema, unspecified emphysema type (H), Chronic atrial fibrillation (H)       CARDIZEM 30 MG tablet   Generic drug:  diltiazem     180 tablet    Take 0.5 tablets (15 mg) by mouth 2 times daily    Chronic atrial fibrillation (H)       ciprofloxacin 500 MG tablet    CIPRO    14 tablet    Take 1 tablet (500 mg) by mouth 2 times daily    COPD exacerbation (H)       ipratropium - albuterol 0.5 mg/2.5 mg/3 mL 0.5-2.5 (3) MG/3ML neb solution    DUONEB    90 vial    Take 1 vial (3 mLs) by nebulization every 6 hours as needed for shortness of breath / dyspnea or wheezing    COPD exacerbation (H)       order for DME     1 Device    Nebulizer machine and tubing/cup    COPD exacerbation (H)       warfarin 5 MG tablet    COUMADIN    110 tablet    Take 5 mg on Monday, Wednesday, Friday and 7.5 mg all other days, or as directed by the coumadin clinic.    Persistent atrial fibrillation (H)

## 2017-08-07 NOTE — TELEPHONE ENCOUNTER
Reason for Call:  Same Day Appointment, Requested Provider:  Catarino Landrum D.O.    PCP: Catarino Landrum    Reason for visit: coughed all night long    Duration of symptoms: ongoing    Have you been treated for this in the past? Yes    Additional comments: would like him seen today, declined next available opening.    Can we leave a detailed message on this number? YES    Phone number patient can be reached at: Home number on file [phone] (home)    Best Time: any    Call taken on 8/7/2017 at 10:09 AM by Diogenes Casey

## 2017-08-09 ENCOUNTER — TELEPHONE (OUTPATIENT)
Dept: INTERNAL MEDICINE | Facility: CLINIC | Age: 82
End: 2017-08-09

## 2017-08-09 NOTE — TELEPHONE ENCOUNTER
Reason for Call:  Other call back    Detailed comments: Pt was prescribed a nebulizer on Monday, they would like a nurse to call them back to make sure they are using it properly     Phone Number Patient can be reached at: Home number on file [phone] (home)    Best Time: anytime    Can we leave a detailed message on this number? YES    Call taken on 8/9/2017 at 1:42 PM by Roma Guan

## 2017-08-09 NOTE — TELEPHONE ENCOUNTER
Mariama asking if Jamal should breath thorough his mouth or nose while doing the nebulizer treatments.   Rn advised to breath in through mouth and it doesn't matter if he exhales through nose or mouth.    Mariama verified understanding.  Treva Guan RN

## 2017-08-22 ENCOUNTER — ANTICOAGULATION THERAPY VISIT (OUTPATIENT)
Dept: ANTICOAGULATION | Facility: CLINIC | Age: 82
End: 2017-08-22
Payer: COMMERCIAL

## 2017-08-22 VITALS — SYSTOLIC BLOOD PRESSURE: 124 MMHG | DIASTOLIC BLOOD PRESSURE: 69 MMHG | HEART RATE: 82 BPM

## 2017-08-22 DIAGNOSIS — Z79.01 LONG-TERM (CURRENT) USE OF ANTICOAGULANTS: ICD-10-CM

## 2017-08-22 DIAGNOSIS — I48.91 ATRIAL FIBRILLATION, UNSPECIFIED TYPE (H): ICD-10-CM

## 2017-08-22 LAB — INR POINT OF CARE: 3.2 (ref 0.86–1.14)

## 2017-08-22 PROCEDURE — 36416 COLLJ CAPILLARY BLOOD SPEC: CPT

## 2017-08-22 PROCEDURE — 85610 PROTHROMBIN TIME: CPT | Mod: QW

## 2017-08-22 PROCEDURE — 99207 ZZC NO CHARGE NURSE ONLY: CPT

## 2017-08-22 NOTE — MR AVS SNAPSHOT
Jamal AGATHA Francis   8/22/2017 1:00 PM   Anticoagulation Therapy Visit    Description:  85 year old male   Provider:  PH ANTI COAG   Department:  [facility]           INR as of 8/22/2017     Today's INR 3.2!      Anticoagulation Summary as of 8/22/2017     INR goal 2.0-3.0   Today's INR 3.2!   Full instructions 5 mg on Mon, Wed, Fri; 7.5 mg all other days   Next INR check 9/19/2017    Indications   Atrial fibrillation (H) [I48.91]  Long-term (current) use of anticoagulants [Z79.01] [Z79.01]         Contact Numbers     Clinic Number:         August 2017 Details    Sun Mon Tue Wed Thu Fri Sat       1               2               3               4               5                 6               7               8               9               10               11               12                 13               14               15               16               17               18               19                 20               21               22      7.5 mg   See details      23      5 mg         24      7.5 mg         25      5 mg         26      7.5 mg           27      7.5 mg         28      5 mg         29      7.5 mg         30      5 mg         31      7.5 mg            Date Details   08/22 This INR check               How to take your warfarin dose     To take:  5 mg Take 1 of the 5 mg tablets.    To take:  7.5 mg Take 1.5 of the 5 mg tablets.           September 2017 Details    Sun Mon Tue Wed Thu Fri Sat          1      5 mg         2      7.5 mg           3      7.5 mg         4      5 mg         5      7.5 mg         6      5 mg         7      7.5 mg         8      5 mg         9      7.5 mg           10      7.5 mg         11      5 mg         12      7.5 mg         13      5 mg         14      7.5 mg         15      5 mg         16      7.5 mg           17      7.5 mg         18      5 mg         19            20               21               22               23                 24               25                26               27               28               29               30                Date Details   No additional details    Date of next INR:  9/19/2017         How to take your warfarin dose     To take:  5 mg Take 1 of the 5 mg tablets.    To take:  7.5 mg Take 1.5 of the 5 mg tablets.

## 2017-08-22 NOTE — PROGRESS NOTES
ANTICOAGULATION FOLLOW-UP CLINIC VISIT    Patient Name:  Jamal Francis  Date:  8/22/2017  Contact Type:  Face to Face    SUBJECTIVE:     Patient Findings     Positives Change in medications (done with cipro last Tuesday)           OBJECTIVE    INR Protime   Date Value Ref Range Status   08/22/2017 3.2 (A) 0.86 - 1.14 Final       ASSESSMENT / PLAN  INR assessment THER    Recheck INR In: 4 WEEKS    INR Location Clinic      Anticoagulation Summary as of 8/22/2017     INR goal 2.0-3.0   Today's INR 3.2!   Maintenance plan 5 mg (5 mg x 1) on Mon, Wed, Fri; 7.5 mg (5 mg x 1.5) all other days   Full instructions 5 mg on Mon, Wed, Fri; 7.5 mg all other days   Weekly total 45 mg   No change documented Livia Cassidy RN   Plan last modified Livia Cassidy RN (3/30/2016)   Next INR check 9/19/2017   Target end date     Indications   Atrial fibrillation (H) [I48.91]  Long-term (current) use of anticoagulants [Z79.01] [Z79.01]         Anticoagulation Episode Summary     INR check location     Preferred lab     Send INR reminders to [facility] POOL    Comments 5 mg tablets         Anticoagulation Care Providers     Provider Role Specialty Phone number    Krystle Goodwin RN Responsible              See the Encounter Report to view Anticoagulation Flowsheet and Dosing Calendar (Go to Encounters tab in chart review, and find the Anticoagulation Therapy Visit)    Dosage adjustment made based on physician directed care plan.      Livia Cassidy RN

## 2017-08-30 ENCOUNTER — OFFICE VISIT (OUTPATIENT)
Dept: FAMILY MEDICINE | Facility: OTHER | Age: 82
End: 2017-08-30
Payer: COMMERCIAL

## 2017-08-30 VITALS
SYSTOLIC BLOOD PRESSURE: 112 MMHG | WEIGHT: 164.5 LBS | DIASTOLIC BLOOD PRESSURE: 80 MMHG | OXYGEN SATURATION: 96 % | BODY MASS INDEX: 26.55 KG/M2 | TEMPERATURE: 96.9 F | HEART RATE: 60 BPM

## 2017-08-30 DIAGNOSIS — J43.9 PULMONARY EMPHYSEMA, UNSPECIFIED EMPHYSEMA TYPE (H): ICD-10-CM

## 2017-08-30 DIAGNOSIS — R31.9 HEMATURIA: ICD-10-CM

## 2017-08-30 DIAGNOSIS — Z13.6 CARDIOVASCULAR SCREENING; LDL GOAL LESS THAN 160: Primary | ICD-10-CM

## 2017-08-30 DIAGNOSIS — I10 ESSENTIAL HYPERTENSION, BENIGN: ICD-10-CM

## 2017-08-30 DIAGNOSIS — I48.20 CHRONIC ATRIAL FIBRILLATION (H): ICD-10-CM

## 2017-08-30 LAB
ALBUMIN UR-MCNC: NEGATIVE MG/DL
APPEARANCE UR: CLEAR
BILIRUB UR QL STRIP: NEGATIVE
CHOLEST SERPL-MCNC: 197 MG/DL
COLOR UR AUTO: YELLOW
GLUCOSE UR STRIP-MCNC: NEGATIVE MG/DL
HDLC SERPL-MCNC: 61 MG/DL
HGB UR QL STRIP: ABNORMAL
KETONES UR STRIP-MCNC: NEGATIVE MG/DL
LDLC SERPL CALC-MCNC: 121 MG/DL
LEUKOCYTE ESTERASE UR QL STRIP: NEGATIVE
NITRATE UR QL: NEGATIVE
NONHDLC SERPL-MCNC: 136 MG/DL
PH UR STRIP: 7.5 PH (ref 5–7)
RBC #/AREA URNS AUTO: NORMAL /HPF
SOURCE: ABNORMAL
SP GR UR STRIP: 1.01 (ref 1–1.03)
TRIGL SERPL-MCNC: 77 MG/DL
UROBILINOGEN UR STRIP-ACNC: 0.2 EU/DL (ref 0.2–1)
WBC #/AREA URNS AUTO: NORMAL /HPF

## 2017-08-30 PROCEDURE — 81001 URINALYSIS AUTO W/SCOPE: CPT | Performed by: INTERNAL MEDICINE

## 2017-08-30 PROCEDURE — 80061 LIPID PANEL: CPT | Performed by: INTERNAL MEDICINE

## 2017-08-30 PROCEDURE — 99213 OFFICE O/P EST LOW 20 MIN: CPT | Performed by: INTERNAL MEDICINE

## 2017-08-30 PROCEDURE — 36415 COLL VENOUS BLD VENIPUNCTURE: CPT | Performed by: INTERNAL MEDICINE

## 2017-08-30 ASSESSMENT — PAIN SCALES - GENERAL: PAINLEVEL: NO PAIN (0)

## 2017-08-30 NOTE — NURSING NOTE
"Chief Complaint   Patient presents with     Cough     f/u       Initial /80 (BP Location: Left arm, Patient Position: Chair, Cuff Size: Adult Regular)  Pulse 60  Temp 96.9  F (36.1  C) (Temporal)  Wt 164 lb 8 oz (74.6 kg)  SpO2 96%  BMI 26.55 kg/m2 Estimated body mass index is 26.55 kg/(m^2) as calculated from the following:    Height as of 1/6/17: 5' 6\" (1.676 m).    Weight as of this encounter: 164 lb 8 oz (74.6 kg).  Medication Reconciliation: complete  Blanca MARROQUIN    "

## 2017-08-30 NOTE — MR AVS SNAPSHOT
"              After Visit Summary   2017    Jamal Francis    MRN: [id number]           Patient Information     Date Of Birth          3/18/1932        Visit Information        Provider Department      2017 8:40 AM Catarino Landrum DO [facility]        Today's Diagnoses     CARDIOVASCULAR SCREENING; LDL GOAL LESS THAN 160    -  1    Pulmonary emphysema, unspecified emphysema type (H)        Chronic atrial fibrillation (H)        Essential hypertension, benign        Hematuria           Follow-ups after your visit        Who to contact     If you have questions or need follow up information about today's clinic visit or your schedule please contact [facility] directly at [phone].  Normal or non-critical lab and imaging results will be communicated to you by MyChart, letter or phone within 4 business days after the clinic has received the results. If you do not hear from us within 7 days, please contact the clinic through MyChart or phone. If you have a critical or abnormal lab result, we will notify you by phone as soon as possible.  Submit refill requests through [employer] or call your pharmacy and they will forward the refill request to us. Please allow 3 business days for your refill to be completed.          Additional Information About Your Visit        MyChart Information     [employer] lets you send messages to your doctor, view your test results, renew your prescriptions, schedule appointments and more. To sign up, go to [email or web address] . Click on \"Log in\" on the left side of the screen, which will take you to the Welcome page. Then click on \"Sign up Now\" on the right side of the page.     You will be asked to enter the access code listed below, as well as some personal information. Please follow the directions to create your username and password.     Your access code is: [email or web address]  Expires: 2017  1:35 PM     Your access code will  in 90 " days. If you need help or a new code, please call your [place] clinic or [phone].        Care EveryWhere ID     This is your Care EveryWhere ID. This could be used by other organizations to access your [place] medical records  [id number]        Your Vitals Were     Pulse Temperature Pulse Oximetry BMI (Body Mass Index)          60 96.9  F (36.1  C) (Temporal) 96% 26.55 kg/m2         Blood Pressure from Last 3 Encounters:   08/30/17 112/80   08/22/17 124/69   08/07/17 (!) 82/52    Weight from Last 3 Encounters:   08/30/17 164 lb 8 oz (74.6 kg)   08/07/17 168 lb (76.2 kg)   03/02/17 168 lb (76.2 kg)              We Performed the Following     *UA reflex to Microscopic and Culture ([place] and [facility] (except [place] and [place])     Lipid panel reflex to direct LDL     Urine Microscopic        Primary Care Provider Office Phone # Fax #    Mofdhlfu Howard Landrum,  [phone]       [address] [place] [address]        Equal Access to Services     [facility] CenterPARTHA : Hadii aad ku hadasho Soomaali, waaxda luqadaha, qaybta kaalmada adedmitry, tiesha avendano . So [facility] [phone].    ATENCIÓN: Si habla español, tiene a singleton disposición servicios gratuitos de asistencia lingüística. LlJoint [facility] [phone].    We comply with applicable federal civil rights laws and [place] laws. We do not discriminate on the basis of race, color, national origin, age, disability sex, sexual orientation or gender identity.            Thank you!     Thank you for choosing [facility]  for your care. Our goal is always to provide you with excellent care. Hearing back from our patients is one way we can continue to improve our services. Please take a few minutes to complete the written survey that you may receive in the mail after your visit with us. Thank you!             Your Updated Medication List - Protect others around you: Learn how to safely use, store and  throw away your medicines at www.disposemymeds.org.          This list is accurate as of: 8/30/17  9:26 AM.  Always use your most recent med list.                   Brand Name Dispense Instructions for use Diagnosis    albuterol 108 (90 BASE) MCG/ACT Inhaler    PROAIR HFA/PROVENTIL HFA/VENTOLIN HFA    1 Inhaler    Inhale 2 puffs into the lungs every 6 hours as needed for shortness of breath / dyspnea or wheezing    Pulmonary emphysema, unspecified emphysema type (H), Chronic atrial fibrillation (H)       CARDIZEM 30 MG tablet   Generic drug:  diltiazem     180 tablet    Take 0.5 tablets (15 mg) by mouth 2 times daily    Chronic atrial fibrillation (H)       ipratropium - albuterol 0.5 mg/2.5 mg/3 mL 0.5-2.5 (3) MG/3ML neb solution    DUONEB    90 vial    Take 1 vial (3 mLs) by nebulization every 6 hours as needed for shortness of breath / dyspnea or wheezing    COPD exacerbation (H)       order for DME     1 Device    Nebulizer machine and tubing/cup    COPD exacerbation (H)       warfarin 5 MG tablet    COUMADIN    110 tablet    Take 5 mg on Monday, Wednesday, Friday and 7.5 mg all other days, or as directed by the coumadin clinic.    Persistent atrial fibrillation (H)

## 2017-08-30 NOTE — PROGRESS NOTES
Chief Complaint   Patient presents with     Cough     f/u     CHIEF COMPLAINT:    The patient is a pleasant 85-year-old gentleman who presents today for follow-up of his recent exacerbation of COPD. He was treated with ciprofloxacin in addition to his normal nebulizer therapy and has had substantial improvement. The cough has now essentially resolved, he is still a little short of breath with exertion but does not have any excessive sputum production. He previously had a little bit of minimal hematuria and did bring in a urine sample today for recheck. He's had no flank pain or suprapubic tenderness. His orthostatic hypotension has improved as well. We have decreased the dosage of his diltiazem. He does have chronic atrial fibrillation and is on chronic anticoagulation for this. This probably augmenting his modest hematuria.                         PAST, FAMILY,SOCIAL HISTORY:     Medical  History:   has a past medical history of Contact dermatitis and other eczema, due to unspecified cause; Essential hypertension, benign (6/27/2016); Inguinal hernia without mention of obstruction or gangrene, unilateral or unspecified, (not specified as recurrent); Injury, other and unspecified, unspecified site; Measles without mention of complication; Mumps without mention of complication; Pneumonia in whoop cough; and Varicella without mention of complication.     Surgical History:   has a past surgical history that includes REPAIR ING HERNIA,5+Y/O,REDUCIBL (1993); cataract iol, rt/lt (7/27/2006); and REMV CATARACT EXTRACAP,INSERT LENS (08/24/06).     Social History:   reports that he has never smoked. He has never used smokeless tobacco. He reports that he drinks alcohol. He reports that he does not use illicit drugs.     Family History:  family history includes CANCER in his father and paternal grandfather; Respiratory in his brother.            MEDICATIONS  Current Outpatient Prescriptions   Medication Sig Dispense Refill      diltiazem (CARDIZEM) 30 MG tablet Take 0.5 tablets (15 mg) by mouth 2 times daily 180 tablet 0     order for DME Nebulizer machine and tubing/cup 1 Device 0     ipratropium - albuterol 0.5 mg/2.5 mg/3 mL (DUONEB) 0.5-2.5 (3) MG/3ML neb solution Take 1 vial (3 mLs) by nebulization every 6 hours as needed for shortness of breath / dyspnea or wheezing 90 vial 1     warfarin (COUMADIN) 5 MG tablet Take 5 mg on Monday, Wednesday, Friday and 7.5 mg all other days, or as directed by the coumadin clinic. 110 tablet 1     albuterol (PROAIR HFA, PROVENTIL HFA, VENTOLIN HFA) 108 (90 BASE) MCG/ACT inhaler Inhale 2 puffs into the lungs every 6 hours as needed for shortness of breath / dyspnea or wheezing 1 Inhaler 3         --------------------------------------------------------------------------------------------------------------------                          REVIEW OF SYSTEMS:         LUNGS: Pt denies: cough,excess sputum, hemoptysis, or shortness of breath at rest. He does have some mild dyspnea with exertion.   HEART: Pt denies: chest pain, sensed arrythmia, syncope, tachy or bradyarrhythmia or excess edema.   GI: Pt denies: nausea, vomitting, diarrhea, constipation, melena, or hematochezia.   NEURO: Pt denies: seizures, strokes, diplopia, weakness, paraesthesias, or paralysis.   SKIN: Pt denies: itching, rashes, discoloration, or specific lesions of concern. Denies recent hair loss.   ENT: Pt denies: sore throat, significant nasal congestion, epistaxis, difficulty swallowing, or changes in base-line hearing.                          EXAMINATION:         /80 (BP Location: Left arm, Patient Position: Chair, Cuff Size: Adult Regular)  Pulse 60  Temp 96.9  F (36.1  C) (Temporal)  Wt 164 lb 8 oz (74.6 kg)  SpO2 96%  BMI 26.55 kg/m2   Constitutional: The patient appears to be in no acute distress. The patient appears to be adequately hydrated. No acute respiratory or hemodynamic distress is noted at this  time.   LUNGS: Breath sounds are much clearer but are still slightly decreased bilaterally, airflow is slowed, no intercostal retraction or stridor is noted. No coughing is noted during visit.   HEART:  Irregularly irregular without rubs, clicks, gallops, or murmurs. PMI is nondisplaced. Upstrokes are brisk. S1,S2 are heard.   GI: Abdomen is soft, without rebound, guarding or tenderness. Bowel sounds are appropriate. No renal bruits are heard.    NEURO: Pt is alert and appropriate. No neurologic lateralization is noted. Cranial nerves 2-12 are intact. Peripheral sensory and motor function are grossly normal  .            ENT: Nasal mucosa is moist, no exudates are seen. No obstruction is present. Pharynx is clear of exudates, no significant cervical adenopathy is present. Tympanic membranes show no infection or evidence of perforation. Thyroid is not nodular or enlarged.                        DECISION MAKIN. CARDIOVASCULAR SCREENING; LDL GOAL LESS THAN 160  Done    2. Pulmonary emphysema, unspecified emphysema type (H)  Markedly improved  Recommend pneumonia and influenza vaccination. Patient refused at the last moment  3. Chronic atrial fibrillation (H)  Continue anticoagulation  Continue rate control with lower dose of diltiazem  4. Essential hypertension, benign  Well controlled on decreased dosage of diltiazem  - Lipid panel reflex to direct LDL    5. Hematuria  Recheck urinalysis, if grossly positive will recommend ultrasound/CT of the kidneys  - *UA reflex to Microscopic and Culture ([place] and [facility] (except [place] and [place])                               FOLLOW UP    I have asked the patient to make an appointment for follow up with me in 3 months or as needed        I have carefully explained the diagnosis and treatment options with the patient. The patient has displayed an understanding of the above, and all subsequent questions were answered.         Catarino Landrum DO  FACOI    Portions of this note were produced using [employer]  Although every attempt at real-time proof reading has been made, occasional grammar/syntax errors may have been missed.

## 2017-09-01 DIAGNOSIS — I48.20 CHRONIC ATRIAL FIBRILLATION (H): Primary | ICD-10-CM

## 2017-09-01 DIAGNOSIS — I48.19 PERSISTENT ATRIAL FIBRILLATION (H): Primary | ICD-10-CM

## 2017-09-01 NOTE — TELEPHONE ENCOUNTER
Diltiazem           Last Written Prescription Date: 8/7/17  Last Fill Quantity: 180, # refills: 0    Last Office Visit with G, P or [facility] prescribing provider:  8/30/17   Future Office Visit:      BP Readings from Last 3 Encounters:   08/30/17 112/80   08/22/17 124/69   08/07/17 (!) 82/52     Lab Results   Component Value Date    ALT 29 05/19/2017     Lab Results   Component Value Date    CHOL 197 08/30/2017     Lab Results   Component Value Date    HDL 61 08/30/2017     Lab Results   Component Value Date     08/30/2017     Lab Results   Component Value Date    TRIG 77 08/30/2017     Lab Results   Component Value Date    CHOLHDLRATIO 3.3 03/10/2015

## 2017-09-01 NOTE — TELEPHONE ENCOUNTER
Warfarin      Last Written Prescription Date:3/21/17  Last Fill Qty: 110, # refills: 1  Last Office Visit with G, P or [facility] prescribing provider: 8/30/17       Date and Result of Last PT/INR:   Lab Results   Component Value Date    INR 3.2 08/22/2017    INR 2.6 06/30/2017    INR 2.42 05/20/2016    INR 1.35 12/05/2005

## 2017-09-05 RX ORDER — WARFARIN SODIUM 5 MG/1
TABLET ORAL
Qty: 110 TABLET | Refills: 1 | Status: SHIPPED | OUTPATIENT
Start: 2017-09-05 | End: 2017-10-09

## 2017-09-06 RX ORDER — DILTIAZEM HYDROCHLORIDE 30 MG/1
15 TABLET, FILM COATED ORAL 2 TIMES DAILY
Qty: 180 TABLET | Refills: 3 | Status: SHIPPED | OUTPATIENT
Start: 2017-09-06 | End: 2018-11-19

## 2017-09-11 NOTE — PROGRESS NOTES
Please contact the patient and notify him of the following:  The cholesterol is somewhat elevated with an LDL of 121.  We will discuss statin medications and diet at the patient's next visit.  The urine sample was normal.  Thank you.  DO JENN Bentley

## 2017-09-26 ENCOUNTER — ANTICOAGULATION THERAPY VISIT (OUTPATIENT)
Dept: ANTICOAGULATION | Facility: CLINIC | Age: 82
End: 2017-09-26
Payer: COMMERCIAL

## 2017-09-26 VITALS — SYSTOLIC BLOOD PRESSURE: 110 MMHG | DIASTOLIC BLOOD PRESSURE: 73 MMHG | HEART RATE: 83 BPM

## 2017-09-26 DIAGNOSIS — Z79.01 LONG-TERM (CURRENT) USE OF ANTICOAGULANTS: ICD-10-CM

## 2017-09-26 DIAGNOSIS — I48.20 CHRONIC ATRIAL FIBRILLATION (H): ICD-10-CM

## 2017-09-26 LAB — INR POINT OF CARE: 3.6 (ref 0.86–1.14)

## 2017-09-26 PROCEDURE — 36416 COLLJ CAPILLARY BLOOD SPEC: CPT

## 2017-09-26 PROCEDURE — 99207 ZZC NO CHARGE NURSE ONLY: CPT

## 2017-09-26 PROCEDURE — 85610 PROTHROMBIN TIME: CPT | Mod: QW

## 2017-09-26 NOTE — PROGRESS NOTES
ANTICOAGULATION FOLLOW-UP CLINIC VISIT    Patient Name:  Jamal Francis  Date:  9/26/2017  Contact Type:  Face to Face    SUBJECTIVE:     Patient Findings     Positives OTC meds (pt taking serrapeptase which may be why INR is up)           OBJECTIVE    INR Protime   Date Value Ref Range Status   09/26/2017 3.6 (A) 0.86 - 1.14 Final       ASSESSMENT / PLAN  INR assessment SUPRA    Recheck INR In: 2 WEEKS    INR Location Clinic      Anticoagulation Summary as of 9/26/2017     INR goal 2.0-3.0   Today's INR 3.6!   Maintenance plan 7.5 mg (5 mg x 1.5) on Fri; 5 mg (5 mg x 1) all other days   Full instructions 9/27: 2.5 mg; Otherwise 7.5 mg on Fri; 5 mg all other days   Weekly total 37.5 mg   Plan last modified Livia Cassidy, RN (9/26/2017)   Next INR check    Target end date     Indications   Atrial fibrillation (H) [I48.91]  Long-term (current) use of anticoagulants [Z79.01] [Z79.01]         Anticoagulation Episode Summary     INR check location     Preferred lab     Send INR reminders to [facility] POOL    Comments 5 mg tablets         Anticoagulation Care Providers     Provider Role Specialty Phone number    Krystle Goodwin, RN Responsible              See the Encounter Report to view Anticoagulation Flowsheet and Dosing Calendar (Go to Encounters tab in chart review, and find the Anticoagulation Therapy Visit)    Dosage adjustment made based on physician directed care plan.    2.5 mg tomorrow (already took today's dose), then decrease to 7.5 mg Mon, Fri and 5 mg ROW    Livia Cassidy, BRIANA

## 2017-09-26 NOTE — MR AVS SNAPSHOT
Jamal Francis   9/26/2017 3:00 PM   Anticoagulation Therapy Visit    Description:  85 year old male   Provider:  [facility]   Department:  [facility]           INR as of 9/26/2017     Today's INR 3.6!      Anticoagulation Summary as of 9/26/2017     INR goal 2.0-3.0   Today's INR 3.6!   Full instructions 9/27: 2.5 mg; Otherwise 7.5 mg on Fri; 5 mg all other days   Next INR check 10/10/2017    Indications   Atrial fibrillation (H) [I48.91]  Long-term (current) use of anticoagulants [Z79.01] [Z79.01]         Your next Anticoagulation Clinic appointment(s)     Oct 10, 2017  3:00 PM CDT   [facility] with [facility]   [facility]    [address] [place] [id number]   [phone]              Contact Numbers     Clinic Number:         September 2017 Details    Sun Mon Tue Wed Thu Fri Sat          1               2                 3               4               5               6               7               8               9                 10               11               12               13               14               15               16                 17               18               19               20               21               22               23                 24               25               26      5 mg   See details      27      2.5 mg         28      5 mg         29      7.5 mg         30      5 mg          Date Details   09/26 This INR check               How to take your warfarin dose     To take:  2.5 mg Take 0.5 of a 5 mg tablet.    To take:  5 mg Take 1 of the 5 mg tablets.    To take:  7.5 mg Take 1.5 of the 5 mg tablets.           October 2017 Details    Sun Mon Tue Wed Thu Fri Sat     1      5 mg         2      5 mg         3      5 mg         4      5 mg         5      5 mg         6      7.5 mg         7      5 mg           8      5 mg         9      5 mg         10            11               12                13               14                 15               16               17               18               19               20               21                 22               23               24               25               26               27               28                 29               30               31                    Date Details   No additional details    Date of next INR:  10/10/2017         How to take your warfarin dose     To take:  5 mg Take 1 of the 5 mg tablets.    To take:  7.5 mg Take 1.5 of the 5 mg tablets.

## 2017-10-09 ENCOUNTER — ANTICOAGULATION THERAPY VISIT (OUTPATIENT)
Dept: ANTICOAGULATION | Facility: CLINIC | Age: 82
End: 2017-10-09
Payer: COMMERCIAL

## 2017-10-09 VITALS — DIASTOLIC BLOOD PRESSURE: 75 MMHG | SYSTOLIC BLOOD PRESSURE: 130 MMHG | HEART RATE: 79 BPM

## 2017-10-09 DIAGNOSIS — I48.20 CHRONIC ATRIAL FIBRILLATION (H): ICD-10-CM

## 2017-10-09 DIAGNOSIS — I48.19 PERSISTENT ATRIAL FIBRILLATION (H): ICD-10-CM

## 2017-10-09 DIAGNOSIS — Z79.01 LONG-TERM (CURRENT) USE OF ANTICOAGULANTS: ICD-10-CM

## 2017-10-09 LAB — INR POINT OF CARE: 2.2 (ref 0.86–1.14)

## 2017-10-09 PROCEDURE — 36416 COLLJ CAPILLARY BLOOD SPEC: CPT

## 2017-10-09 PROCEDURE — 85610 PROTHROMBIN TIME: CPT | Mod: QW

## 2017-10-09 PROCEDURE — 99207 ZZC NO CHARGE NURSE ONLY: CPT

## 2017-10-09 RX ORDER — WARFARIN SODIUM 5 MG/1
TABLET ORAL
Qty: 110 TABLET | Refills: 1 | COMMUNITY
Start: 2017-10-09 | End: 2017-11-07

## 2017-10-09 NOTE — PROGRESS NOTES
ANTICOAGULATION FOLLOW-UP CLINIC VISIT    Patient Name:  Jamal Francis  Date:  10/9/2017  Contact Type:  Face to Face    SUBJECTIVE:     Patient Findings     Positives OTC meds (still taking irasema paptase)           OBJECTIVE    INR Protime   Date Value Ref Range Status   10/09/2017 2.2 (A) 0.86 - 1.14 Final       ASSESSMENT / PLAN  INR assessment THER    Recheck INR In: 4 WEEKS    INR Location Clinic      Anticoagulation Summary as of 10/9/2017     INR goal 2.0-3.0   Today's INR 2.2   Maintenance plan 7.5 mg (5 mg x 1.5) on Fri; 5 mg (5 mg x 1) all other days   Full instructions 7.5 mg on Fri; 5 mg all other days   Weekly total 37.5 mg   No change documented Livia Cassidy RN   Plan last modified Livia Cassidy RN (9/26/2017)   Next INR check 11/6/2017   Target end date     Indications   Atrial fibrillation (H) [I48.91]  Long-term (current) use of anticoagulants [Z79.01] [Z79.01]         Anticoagulation Episode Summary     INR check location     Preferred lab     Send INR reminders to [facility] POOL    Comments 5 mg tablets         Anticoagulation Care Providers     Provider Role Specialty Phone number    Krystle Goodwin, RN Responsible              See the Encounter Report to view Anticoagulation Flowsheet and Dosing Calendar (Go to Encounters tab in chart review, and find the Anticoagulation Therapy Visit)    Dosage adjustment made based on physician directed care plan.    Livia Cassidy RN

## 2017-11-07 ENCOUNTER — ANTICOAGULATION THERAPY VISIT (OUTPATIENT)
Dept: ANTICOAGULATION | Facility: CLINIC | Age: 82
End: 2017-11-07
Payer: COMMERCIAL

## 2017-11-07 DIAGNOSIS — Z79.01 LONG-TERM (CURRENT) USE OF ANTICOAGULANTS: ICD-10-CM

## 2017-11-07 DIAGNOSIS — I48.19 PERSISTENT ATRIAL FIBRILLATION (H): ICD-10-CM

## 2017-11-07 DIAGNOSIS — I48.20 CHRONIC ATRIAL FIBRILLATION (H): ICD-10-CM

## 2017-11-07 LAB — INR POINT OF CARE: 1.7 (ref 0.86–1.14)

## 2017-11-07 PROCEDURE — 36416 COLLJ CAPILLARY BLOOD SPEC: CPT

## 2017-11-07 PROCEDURE — 85610 PROTHROMBIN TIME: CPT | Mod: QW

## 2017-11-07 PROCEDURE — 99207 ZZC NO CHARGE NURSE ONLY: CPT

## 2017-11-07 RX ORDER — WARFARIN SODIUM 5 MG/1
TABLET ORAL
Qty: 110 TABLET | Refills: 1 | COMMUNITY
Start: 2017-11-07 | End: 2018-02-12

## 2017-11-07 NOTE — PROGRESS NOTES
ANTICOAGULATION FOLLOW-UP CLINIC VISIT    Patient Name:  Jamal Francis  Date:  11/7/2017  Contact Type:  Face to Face    SUBJECTIVE:     Patient Findings     Positives Unexplained INR or factor level change           OBJECTIVE    INR Protime   Date Value Ref Range Status   11/07/2017 1.7 (A) 0.86 - 1.14 Final       ASSESSMENT / PLAN  INR assessment SUB    Recheck INR In: 2 WEEKS    INR Location Clinic      Anticoagulation Summary as of 11/7/2017     INR goal 2.0-3.0   Today's INR 1.7!   Maintenance plan 7.5 mg (5 mg x 1.5) on Mon, Fri; 5 mg (5 mg x 1) all other days   Full instructions 11/7: 10 mg; Otherwise 7.5 mg on Mon, Fri; 5 mg all other days   Weekly total 40 mg   Plan last modified Livia Cassidy RN (11/7/2017)   Next INR check 11/21/2017   Target end date     Indications   Atrial fibrillation (H) [I48.91]  Long-term (current) use of anticoagulants [Z79.01] [Z79.01]         Anticoagulation Episode Summary     INR check location     Preferred lab     Send INR reminders to [facility] POOL    Comments 5 mg tablets         Anticoagulation Care Providers     Provider Role Specialty Phone number    Krystle Goodwin RN Responsible              See the Encounter Report to view Anticoagulation Flowsheet and Dosing Calendar (Go to Encounters tab in chart review, and find the Anticoagulation Therapy Visit)    Dosage adjustment made based on physician directed care plan.      Livia Cassidy, BRIANA

## 2017-11-07 NOTE — MR AVS SNAPSHOT
Jamal AGATHA Francis   11/7/2017 1:15 PM   Anticoagulation Therapy Visit    Description:  85 year old male   Provider:  PH ANTI COAG   Department:  [facility]           INR as of 11/7/2017     Today's INR 1.7!      Anticoagulation Summary as of 11/7/2017     INR goal 2.0-3.0   Today's INR 1.7!   Full instructions 11/7: 10 mg; Otherwise 7.5 mg on Mon, Fri; 5 mg all other days   Next INR check 11/21/2017    Indications   Atrial fibrillation (H) [I48.91]  Long-term (current) use of anticoagulants [Z79.01] [Z79.01]         Contact Numbers     Clinic Number:         November 2017 Details    Sun Mon Tue Wed Thu Fri Sat        1               2               3               4                 5               6               7      10 mg   See details      8      5 mg         9      5 mg         10      7.5 mg         11      5 mg           12      5 mg         13      7.5 mg         14      5 mg         15      5 mg         16      5 mg         17      7.5 mg         18      5 mg           19      5 mg         20      7.5 mg         21            22               23               24               25                 26               27               28               29               30                  Date Details   11/07 This INR check       Date of next INR:  11/21/2017         How to take your warfarin dose     To take:  5 mg Take 1 of the 5 mg tablets.    To take:  7.5 mg Take 1.5 of the 5 mg tablets.    To take:  10 mg Take 2 of the 5 mg tablets.

## 2017-12-05 ENCOUNTER — ANTICOAGULATION THERAPY VISIT (OUTPATIENT)
Dept: ANTICOAGULATION | Facility: CLINIC | Age: 82
End: 2017-12-05
Payer: COMMERCIAL

## 2017-12-05 VITALS — DIASTOLIC BLOOD PRESSURE: 89 MMHG | HEART RATE: 76 BPM | SYSTOLIC BLOOD PRESSURE: 144 MMHG

## 2017-12-05 DIAGNOSIS — Z79.01 LONG-TERM (CURRENT) USE OF ANTICOAGULANTS: ICD-10-CM

## 2017-12-05 DIAGNOSIS — I48.20 CHRONIC ATRIAL FIBRILLATION (H): ICD-10-CM

## 2017-12-05 LAB — INR POINT OF CARE: 2 (ref 0.86–1.14)

## 2017-12-05 PROCEDURE — 99207 ZZC NO CHARGE NURSE ONLY: CPT

## 2017-12-05 PROCEDURE — 36416 COLLJ CAPILLARY BLOOD SPEC: CPT

## 2017-12-05 PROCEDURE — 85610 PROTHROMBIN TIME: CPT | Mod: QW

## 2017-12-05 NOTE — MR AVS SNAPSHOT
Jamal SNIDER Penny   12/5/2017 1:45 PM   Anticoagulation Therapy Visit    Description:  85 year old male   Provider:  PH ANTI COAG   Department:  [facility]           INR as of 12/5/2017     Today's INR 2.0      Anticoagulation Summary as of 12/5/2017     INR goal 2.0-3.0   Today's INR 2.0   Full instructions 7.5 mg on Mon, Fri; 5 mg all other days   Next INR check 1/2/2018    Indications   Atrial fibrillation (H) [I48.91]  Long-term (current) use of anticoagulants [Z79.01] [Z79.01]         Contact Numbers     Clinic Number:         December 2017 Details    Sun Mon Tue Wed Thu Fri Sat          1               2                 3               4               5      5 mg   See details      6      5 mg         7      5 mg         8      7.5 mg         9      5 mg           10      5 mg         11      7.5 mg         12      5 mg         13      5 mg         14      5 mg         15      7.5 mg         16      5 mg           17      5 mg         18      7.5 mg         19      5 mg         20      5 mg         21      5 mg         22      7.5 mg         23      5 mg           24      5 mg         25      7.5 mg         26      5 mg         27      5 mg         28      5 mg         29      7.5 mg         30      5 mg           31      5 mg                Date Details   12/05 This INR check               How to take your warfarin dose     To take:  5 mg Take 1 of the 5 mg tablets.    To take:  7.5 mg Take 1.5 of the 5 mg tablets.           January 2018 Details    Sun Mon Tue Wed Thu Fri Sat      1      7.5 mg         2            3               4               5               6                 7               8               9               10               11               12               13                 14               15               16               17               18               19               20                 21               22               23               24               25               26                27                 28               29               30               31                   Date Details   No additional details    Date of next INR:  1/2/2018         How to take your warfarin dose     To take:  5 mg Take 1 of the 5 mg tablets.    To take:  7.5 mg Take 1.5 of the 5 mg tablets.

## 2017-12-05 NOTE — PROGRESS NOTES
ANTICOAGULATION FOLLOW-UP CLINIC VISIT    Patient Name:  Jamal Francis  Date:  12/5/2017  Contact Type:  Face to Face    SUBJECTIVE:     Patient Findings     Positives No Problem Findings           OBJECTIVE    INR Protime   Date Value Ref Range Status   12/05/2017 2.0 (A) 0.86 - 1.14 Final       ASSESSMENT / PLAN  INR assessment THER    Recheck INR In: 4 WEEKS    INR Location Clinic      Anticoagulation Summary as of 12/5/2017     INR goal 2.0-3.0   Today's INR 2.0   Maintenance plan 7.5 mg (5 mg x 1.5) on Mon, Fri; 5 mg (5 mg x 1) all other days   Full instructions 7.5 mg on Mon, Fri; 5 mg all other days   Weekly total 40 mg   No change documented Livia Cassidy RN   Plan last modified Livia Cassidy RN (11/7/2017)   Next INR check 1/2/2018   Target end date     Indications   Atrial fibrillation (H) [I48.91]  Long-term (current) use of anticoagulants [Z79.01] [Z79.01]         Anticoagulation Episode Summary     INR check location     Preferred lab     Send INR reminders to [facility] POOL    Comments 5 mg tablets         Anticoagulation Care Providers     Provider Role Specialty Phone number    Krystle Goodwin RN Responsible              See the Encounter Report to view Anticoagulation Flowsheet and Dosing Calendar (Go to Encounters tab in chart review, and find the Anticoagulation Therapy Visit)    Dosage adjustment made based on physician directed care plan.        Livia Cassidy, BRIANA

## 2018-02-06 ENCOUNTER — ANTICOAGULATION THERAPY VISIT (OUTPATIENT)
Dept: ANTICOAGULATION | Facility: CLINIC | Age: 83
End: 2018-02-06
Payer: COMMERCIAL

## 2018-02-06 VITALS — SYSTOLIC BLOOD PRESSURE: 116 MMHG | DIASTOLIC BLOOD PRESSURE: 69 MMHG | HEART RATE: 91 BPM

## 2018-02-06 DIAGNOSIS — I48.20 CHRONIC ATRIAL FIBRILLATION (H): ICD-10-CM

## 2018-02-06 DIAGNOSIS — Z79.01 LONG-TERM (CURRENT) USE OF ANTICOAGULANTS: ICD-10-CM

## 2018-02-06 LAB — INR POINT OF CARE: 2.8 (ref 0.86–1.14)

## 2018-02-06 PROCEDURE — 99207 ZZC NO CHARGE NURSE ONLY: CPT

## 2018-02-06 PROCEDURE — 85610 PROTHROMBIN TIME: CPT | Mod: QW

## 2018-02-06 PROCEDURE — 36416 COLLJ CAPILLARY BLOOD SPEC: CPT

## 2018-02-06 NOTE — PROGRESS NOTES
ANTICOAGULATION FOLLOW-UP CLINIC VISIT    Patient Name:  Jamal Francis  Date:  2/6/2018  Contact Type:  Face to Face    SUBJECTIVE:     Patient Findings     Positives No Problem Findings           OBJECTIVE    INR Protime   Date Value Ref Range Status   02/06/2018 2.8 (A) 0.86 - 1.14 Final       ASSESSMENT / PLAN  INR assessment THER    Recheck INR In: 6 WEEKS    INR Location Clinic      Anticoagulation Summary as of 2/6/2018     INR goal 2.0-3.0   Today's INR 2.8   Maintenance plan 7.5 mg (5 mg x 1.5) on Mon, Fri; 5 mg (5 mg x 1) all other days   Full instructions 7.5 mg on Mon, Fri; 5 mg all other days   Weekly total 40 mg   No change documented Livia Cassidy RN   Plan last modified Livia Cassidy RN (11/7/2017)   Next INR check 3/20/2018   Target end date     Indications   Atrial fibrillation (H) [I48.91]  Long-term (current) use of anticoagulants [Z79.01] [Z79.01]         Anticoagulation Episode Summary     INR check location     Preferred lab     Send INR reminders to [facility] POOL    Comments 5 mg tablets         Anticoagulation Care Providers     Provider Role Specialty Phone number    Krystle Goodwin RN Responsible              See the Encounter Report to view Anticoagulation Flowsheet and Dosing Calendar (Go to Encounters tab in chart review, and find the Anticoagulation Therapy Visit)    Dosage adjustment made based on physician directed care plan.      Livia Cassidy, BRIANA

## 2018-02-06 NOTE — MR AVS SNAPSHOT
Jamal Francis   2/6/2018 3:00 PM   Anticoagulation Therapy Visit    Description:  85 year old male   Provider:  [facility]   Department:  [facility]           INR as of 2/6/2018     Today's INR 2.8      Anticoagulation Summary as of 2/6/2018     INR goal 2.0-3.0   Today's INR 2.8   Full instructions 7.5 mg on Mon, Fri; 5 mg all other days   Next INR check 3/20/2018    Indications   Atrial fibrillation (H) [I48.91]  Long-term (current) use of anticoagulants [Z79.01] [Z79.01]         Your next Anticoagulation Clinic appointment(s)     Feb 06, 2018  3:00 PM CST   Anticoagulation Visit with [facility]A ANTI COAG   [facility]    [address] [place] [id number] [phone]              Contact Numbers     Clinic Number:         February 2018 Details    Sun Mon Tue Wed Thu Fri Sat         1               2               3                 4               5               6      5 mg   See details      7      5 mg         8      5 mg         9      7.5 mg         10      5 mg           11      5 mg         12      7.5 mg         13      5 mg         14      5 mg         15      5 mg         16      7.5 mg         17      5 mg           18      5 mg         19      7.5 mg         20      5 mg         21      5 mg         22      5 mg         23      7.5 mg         24      5 mg           25      5 mg         26      7.5 mg         27      5 mg         28      5 mg             Date Details   02/06 This INR check               How to take your warfarin dose     To take:  5 mg Take 1 of the 5 mg tablets.    To take:  7.5 mg Take 1.5 of the 5 mg tablets.           March 2018 Details    Sun Mon Tue Wed Thu Fri Sat         1      5 mg         2      7.5 mg         3      5 mg           4      5 mg         5      7.5 mg         6      5 mg         7      5 mg         8      5 mg         9      7.5 mg         10      5 mg           11      5 mg         12      7.5 mg          13      5 mg         14      5 mg         15      5 mg         16      7.5 mg         17      5 mg           18      5 mg         19      7.5 mg         20            21               22               23               24                 25               26               27               28               29               30               31                Date Details   No additional details    Date of next INR:  3/20/2018         How to take your warfarin dose     To take:  5 mg Take 1 of the 5 mg tablets.    To take:  7.5 mg Take 1.5 of the 5 mg tablets.

## 2018-02-12 DIAGNOSIS — I48.19 PERSISTENT ATRIAL FIBRILLATION (H): ICD-10-CM

## 2018-02-12 RX ORDER — WARFARIN SODIUM 5 MG/1
TABLET ORAL
Qty: 110 TABLET | Refills: 3 | Status: SHIPPED | OUTPATIENT
Start: 2018-02-12 | End: 2019-05-05

## 2018-02-12 NOTE — TELEPHONE ENCOUNTER
"Requested Prescriptions   Pending Prescriptions Disp Refills     warfarin (COUMADIN) 5 MG tablet 110 tablet 1     Sig: Take 7.5 mg on Monday, Friday and 5 all other days, or as directed by the coumadin clinic.    Vitamin K Antagonists Failed    2/12/2018  7:33 AM       Failed - INR is within goal in the past 6 weeks    Confirm INR is within goal in the past 6 weeks.     Recent Labs   Lab Test 02/06/18   INR  2.8*                      Passed - Recent or future visit with authorizing provider's specialty    Patient had office visit in the last year or has a visit in the next 30 days with authorizing provider.  See \"Patient Info\" tab in inbasket, or \"Choose Columns\" in Meds & Orders section of the refill encounter.            Passed - Patient is 18 years of age or older        Last Written Prescription Date:  11/7/2017  Last Fill Quantity: 110,  # refills: 1   Last office visit: 8/30/2017 with prescribing provider:  8/30/2017   Future Office Visit:      "

## 2018-03-10 ENCOUNTER — APPOINTMENT (OUTPATIENT)
Dept: GENERAL RADIOLOGY | Facility: CLINIC | Age: 83
End: 2018-03-10
Attending: FAMILY MEDICINE
Payer: COMMERCIAL

## 2018-03-10 ENCOUNTER — HOSPITAL ENCOUNTER (EMERGENCY)
Facility: CLINIC | Age: 83
Discharge: HOME | End: 2018-03-10
Attending: FAMILY MEDICINE | Admitting: FAMILY MEDICINE
Payer: COMMERCIAL

## 2018-03-10 VITALS
TEMPERATURE: 97.2 F | RESPIRATION RATE: 16 BRPM | DIASTOLIC BLOOD PRESSURE: 78 MMHG | WEIGHT: 160 LBS | SYSTOLIC BLOOD PRESSURE: 152 MMHG | BODY MASS INDEX: 25.82 KG/M2 | OXYGEN SATURATION: 98 % | HEART RATE: 78 BPM

## 2018-03-10 DIAGNOSIS — S42.018A CLOSED NONDISPLACED FRACTURE OF STERNAL END OF LEFT CLAVICLE, INITIAL ENCOUNTER: ICD-10-CM

## 2018-03-10 DIAGNOSIS — S22.32XA CLOSED FRACTURE OF ONE RIB OF LEFT SIDE, INITIAL ENCOUNTER: ICD-10-CM

## 2018-03-10 LAB
APTT PPP: 35 SEC (ref 22–37)
INR PPP: 2.63 (ref 0.86–1.14)

## 2018-03-10 PROCEDURE — 99285 EMERGENCY DEPT VISIT HI MDM: CPT | Mod: 25 | Performed by: FAMILY MEDICINE

## 2018-03-10 PROCEDURE — 85730 THROMBOPLASTIN TIME PARTIAL: CPT | Performed by: FAMILY MEDICINE

## 2018-03-10 PROCEDURE — 85610 PROTHROMBIN TIME: CPT | Performed by: FAMILY MEDICINE

## 2018-03-10 PROCEDURE — 71101 X-RAY EXAM UNILAT RIBS/CHEST: CPT | Mod: TC,LT

## 2018-03-10 PROCEDURE — 99285 EMERGENCY DEPT VISIT HI MDM: CPT | Mod: Z6 | Performed by: FAMILY MEDICINE

## 2018-03-10 PROCEDURE — 25000132 ZZH RX MED GY IP 250 OP 250 PS 637: Performed by: FAMILY MEDICINE

## 2018-03-10 PROCEDURE — 73000 X-RAY EXAM OF COLLAR BONE: CPT | Mod: TC,LT

## 2018-03-10 RX ORDER — ACETAMINOPHEN 325 MG/1
975 TABLET ORAL ONCE
Status: DISCONTINUED | OUTPATIENT
Start: 2018-03-10 | End: 2018-03-10 | Stop reason: HOSPADM

## 2018-03-10 RX ORDER — HYDROCODONE BITARTRATE AND ACETAMINOPHEN 5; 325 MG/1; MG/1
1-2 TABLET ORAL EVERY 4 HOURS PRN
Qty: 15 TABLET | Refills: 0 | Status: SHIPPED | OUTPATIENT
Start: 2018-03-10 | End: 2019-06-24

## 2018-03-10 RX ORDER — HYDROCODONE BITARTRATE AND ACETAMINOPHEN 5; 325 MG/1; MG/1
1 TABLET ORAL ONCE
Status: COMPLETED | OUTPATIENT
Start: 2018-03-10 | End: 2018-03-10

## 2018-03-10 RX ADMIN — HYDROCODONE BITARTRATE AND ACETAMINOPHEN 1 TABLET: 5; 325 TABLET ORAL at 16:23

## 2018-03-10 ASSESSMENT — ENCOUNTER SYMPTOMS
WOUND: 0
HEADACHES: 0
VOMITING: 0
NAUSEA: 0
NECK PAIN: 0

## 2018-03-10 NOTE — DISCHARGE INSTRUCTIONS
1.  I am so sorry about your fall that left you with a couple of bone breaks but things will get better over the next few weeks.    2.  You need to apply ice to your tender areas as often as possible, especially over the next few days.    3.  Use Tylenol during the day and use your hydrocodone sparingly, mostly at night to help you sleep.    4.  Please return to the emergency department if you develop any of the following: Headache, neck pain, lightheadedness or dizziness, trouble breathing or shortness of breath.    5.  I would follow-up with your doctor next week to make sure things are continuing to improve.  Thank you for allowing us to care for you today.  ------------------------------------------------------------------------------

## 2018-03-10 NOTE — ED AVS SNAPSHOT
[facility] Emergency Department    [address] [place] [address]    Phone:  [phone]    Fax:  [phone]                                       Jamal Francis   MRN: [id number]    Department:  [facility] Emergency Department   Date of Visit:  3/10/2018           Patient Information     Date Of Birth          3/18/1932        Your diagnoses for this visit were:     Closed nondisplaced fracture of sternal end of left clavicle, initial encounter     Closed fracture of one rib of left side, initial encounter        You were seen by Franco Valladares MD.      Follow-up Information     Follow up with Catarino Landrum DO. Schedule an appointment as soon as possible for a visit in 4 days.    Specialty:  Internal Medicine    Why:  For follow up on your ED stay    Contact information:    [address] [place] [address]  [phone]          Discharge Instructions       1.  I am so sorry about your fall that left you with a couple of bone breaks but things will get better over the next few weeks.    2.  You need to apply ice to your tender areas as often as possible, especially over the next few days.    3.  Use Tylenol during the day and use your hydrocodone sparingly, mostly at night to help you sleep.    4.  Please return to the emergency department if you develop any of the following: Headache, neck pain, lightheadedness or dizziness, trouble breathing or shortness of breath.    5.  I would follow-up with your doctor next week to make sure things are continuing to improve.  Thank you for allowing us to care for you today.  ------------------------------------------------------------------------------          Discharge References/Attachments     FRACTURE, CLAVICLE (ENGLISH)    RIB FRACTURE (ENGLISH)      24 Hour Appointment Hotline       To make an appointment at any [place] clinic, call [phone] ([phone]). If you don't have a family doctor or clinic,  we will help you find one. [facility] are conveniently located to serve the needs of you and your family.             Review of your medicines      START taking        Dose / Directions Last dose taken    HYDROcodone-acetaminophen 5-325 MG per tablet   Commonly known as:  NORCO   Dose:  1-2 tablet   Quantity:  15 tablet        Take 1-2 tablets by mouth every 4 hours as needed for moderate to severe pain   Refills:  0          Our records show that you are taking the medicines listed below. If these are incorrect, please call your family doctor or clinic.        Dose / Directions Last dose taken    albuterol 108 (90 BASE) MCG/ACT Inhaler   Commonly known as:  PROAIR HFA/PROVENTIL HFA/VENTOLIN HFA   Dose:  2 puff   Quantity:  1 Inhaler        Inhale 2 puffs into the lungs every 6 hours as needed for shortness of breath / dyspnea or wheezing   Refills:  3        diltiazem 30 MG tablet   Commonly known as:  CARDIZEM   Dose:  15 mg   Quantity:  180 tablet        Take 0.5 tablets (15 mg) by mouth 2 times daily   Refills:  3        ipratropium - albuterol 0.5 mg/2.5 mg/3 mL 0.5-2.5 (3) MG/3ML neb solution   Commonly known as:  DUONEB   Dose:  1 vial   Quantity:  90 vial        Take 1 vial (3 mLs) by nebulization every 6 hours as needed for shortness of breath / dyspnea or wheezing   Refills:  1        order for DME   Quantity:  1 Device        Nebulizer machine and tubing/cup   Refills:  0        warfarin 5 MG tablet   Commonly known as:  COUMADIN   Quantity:  110 tablet        Take 7.5 mg on Monday, Friday and 5 all other days, or as directed by the coumadin clinic.   Refills:  3                Prescriptions were sent or printed at these locations (1 Prescription)                   Other Prescriptions                Printed at Department/Unit printer (1 of 1)         HYDROcodone-acetaminophen (NORCO) 5-325 MG per tablet                Procedures and tests performed during your visit     Clavicle XR, left    INR     "Partial thromboplastin time    Ribs XR, unilat 3 views + PA chest,  left      Orders Needing Specimen Collection     None      Pending Results     Date and Time Order Name Status Description    3/10/2018 1524 Clavicle XR, left Preliminary             Pending Culture Results     No orders found from 3/8/2018 to 3/11/2018.            Pending Results Instructions     If you had any lab results that were not finalized at the time of your Discharge, you can call the ED Lab Result RN at [phone]. You will be contacted by this team for any positive Lab results or changes in treatment. The nurses are available 7 days a week from 10A to 6:30P.  You can leave a message 24 hours per day and they will return your call.        Thank you for choosing [facility]       Thank you for choosing [facility] for your care. Our goal is always to provide you with excellent care. Hearing back from our patients is one way we can continue to improve our services. Please take a few minutes to complete the written survey that you may receive in the mail after you visit with us. Thank you!        [facility] Information     [facility] lets you send messages to your doctor, view your test results, renew your prescriptions, schedule appointments and more. To sign up, go to [email or web address] . Click on \"Log in\" on the left side of the screen, which will take you to the Welcome page. Then click on \"Sign up Now\" on the right side of the page.     You will be asked to enter the access code listed below, as well as some personal information. Please follow the directions to create your username and password.     Your access code is: [email or web address]  Expires: 2018  4:29 PM     Your access code will  in 90 days. If you need help or a new code, please call your [facility] clinic or [phone].        Care EveryWhere ID     This is your Care EveryWhere ID. This could be used by other organizations to access your [facility] medical " records  [id number]        Equal Access to Services     [facility] MAKI : Hadii bhavin Gaspar, arabella reyes, tiesha rouse. So [facility] [phone].    ATENCIÓN: Si habla español, tiene a singleton disposición servicios gratuitos de asistencia lingüística. Llame al [phone].    We comply with applicable federal civil rights laws and [place] laws. We do not discriminate on the basis of race, color, national origin, age, disability, sex, sexual orientation, or gender identity.            After Visit Summary       This is your record. Keep this with you and show to your community pharmacist(s) and doctor(s) at your next visit.

## 2018-03-10 NOTE — ED PROVIDER NOTES
History     Chief Complaint   Patient presents with     Fall     The history is provided by the patient.     Jamal Francis is a 85 year old male with a history of Atrial Fibrillation, Hypertension, and COPD who presents to the ED via private car following a fall. The patient was walking on the ice today and slipped, landing on his left side around 1000. He denies any head injury/ LOC. Following this fall, he has been experiencing pain to the left anterior shoulder and left anterior chest wall. He was able to get up and ambulate on his own after the fall. The patient denies any head pain, neck pain, elbow pain, pain with cough, pain with ambulation, other injuries from this incident, or other associated symptoms. Patient is on long term anticoagulant therapy - his last INR was 2.8 on 2/6/2018.     Problem List:    Patient Active Problem List    Diagnosis Date Noted     Essential hypertension, benign 05/20/2016     Priority: Medium     Long-term (current) use of anticoagulants [Z79.01] 03/29/2016     Priority: Medium     COPD (chronic obstructive pulmonary disease) (H) 03/17/2015     Priority: Medium     Advanced directives, counseling/discussion 10/04/2012     Priority: Medium     .Patient states has Advance Directive and will bring in a copy to clinic. 10/4/2012          Health Care Home 05/03/2012     Priority: Medium     Ashley Kilpatrick RN-PHN  FPA / [facility]   [phone]     DX V65.8 REPLACED WITH 95465 HEALTH CARE HOME (04/08/2013)       CARDIOVASCULAR SCREENING; LDL GOAL LESS THAN 160 10/31/2010     Priority: Medium     Atrial fibrillation (H) 12/05/2005     Priority: Medium        Past Medical History:    Past Medical History:   Diagnosis Date     Contact dermatitis and other eczema, due to unspecified cause      Essential hypertension, benign 6/27/2016     Inguinal hernia without mention of obstruction or gangrene, unilateral or unspecified, (not specified as recurrent)       Injury, other and unspecified, unspecified site      Measles without mention of complication      Mumps without mention of complication      Pneumonia in whooping cough(484.3)      Varicella without mention of complication        Past Surgical History:    Past Surgical History:   Procedure Laterality Date     CATARACT IOL, RT/LT  7/27/2006    Right eye     HC REMV CATARACT EXTRACAP,INSERT LENS  08/24/06    lEFT EYE     HC REPAIR ING HERNIA,5+Y/O,REDUCIBL  1993       Family History:    Family History   Problem Relation Age of Onset     CANCER Father      Liver     CANCER Paternal Grandfather      Respiratory Brother      Pneumonia       Social History:  Marital Status:   [2]  Social History   Substance Use Topics     Smoking status: Never Smoker     Smokeless tobacco: Never Used     Alcohol use 0.0 oz/week     0 Standard drinks or equivalent per week      Comment: Moderately        Medications:      warfarin (COUMADIN) 5 MG tablet   diltiazem (CARDIZEM) 30 MG tablet   order for DME   ipratropium - albuterol 0.5 mg/2.5 mg/3 mL (DUONEB) 0.5-2.5 (3) MG/3ML neb solution   albuterol (PROAIR HFA, PROVENTIL HFA, VENTOLIN HFA) 108 (90 BASE) MCG/ACT inhaler         Review of Systems   Gastrointestinal: Negative for nausea and vomiting.   Musculoskeletal: Negative for gait problem and neck pain.        Positive for left shoulder pain and left chest wall pain.   Skin: Negative for wound.   Neurological: Negative for headaches.   All other systems reviewed and are negative.      Physical Exam   BP: 170/89  Pulse: 82  Temp: 97.2  F (36.2  C)  Resp: 20  Weight: 72.6 kg (160 lb)  SpO2: 99 %      Physical Exam   Constitutional: He is oriented to person, place, and time. No distress.   HENT:   Head: Normocephalic and atraumatic. Head is without abrasion and without contusion.   Right Ear: Tympanic membrane, external ear and ear canal normal. No hemotympanum.   Left Ear: Tympanic membrane, external ear and ear canal normal. No  hemotympanum.   Nose: Nose normal.   Mouth/Throat: Oropharynx is clear and moist.   Eyes: Conjunctivae and EOM are normal. Pupils are equal, round, and reactive to light.   Neck: Normal range of motion. Neck supple.   Cardiovascular: Normal rate, regular rhythm, normal heart sounds and intact distal pulses.    No murmur heard.  Pulmonary/Chest: Effort normal and breath sounds normal. No respiratory distress. He has no wheezes. He has no rales. He exhibits tenderness (left anterior chest wall).   Abdominal: Soft. Bowel sounds are normal. He exhibits no distension and no mass. There is no tenderness. There is no rebound and no guarding.   Musculoskeletal: Normal range of motion.        Right shoulder: Normal.        Left shoulder: He exhibits tenderness (proximal left clavicle). He exhibits no deformity.        Right elbow: Normal.       Left elbow: Normal.        Cervical back: Normal. He exhibits normal range of motion and no tenderness.        Thoracic back: Normal. He exhibits normal range of motion and no tenderness.        Lumbar back: Normal. He exhibits normal range of motion and no tenderness.   Lymphadenopathy:     He has no cervical adenopathy.   Neurological: He is alert and oriented to person, place, and time. He has normal strength. No cranial nerve deficit or sensory deficit. GCS eye subscore is 4. GCS verbal subscore is 5. GCS motor subscore is 6.   Skin: Skin is warm and dry. No bruising, no ecchymosis and no rash noted. He is not diaphoretic. No pallor.   Psychiatric: He has a normal mood and affect. His behavior is normal.   Nursing note and vitals reviewed.      ED Course     ED Course     Procedures           Results for orders placed or performed during the hospital encounter of 03/10/18   Ribs XR, unilat 3 views + PA chest,  left    Narrative    RIBS AND CHEST LEFT THREE VIEW  3/10/2018 2:59 PM     HISTORY: Anterior chest pain, fall.     COMPARISON: None.      Impression    IMPRESSION: PA chest  shows mild blunting of the left lateral  costophrenic angle which may represent scarring or minimal effusion.  Otherwise no active cardiopulmonary disease. There is a tortuous  thoracic aorta. There is mild deformity of the anterior lateral left  10th rib. This may be related to an old healed fracture but an acute  nondisplaced fracture is not excluded and clinical correlation for  point tenderness in this region is recommended.   Clavicle XR, left    Narrative    CLAVICLE LEFT TWO VIEW   3/10/2018 3:58 PM     HISTORY: Fell with clavicular tenderness.    COMPARISON: None.    FINDINGS: Degenerative changes left shoulder with moderate  hypertrophic changes along the medial aspect of the humeral head. Old  healed fracture of the midshaft of left clavicle. Tiny left cervical  rib. No acute appearing abnormality.      Impression    IMPRESSION: Degenerative changes left shoulder. Old left clavicular  fracture. Nothing acute.   INR   Result Value Ref Range    INR 2.63 (H) 0.86 - 1.14   Partial thromboplastin time   Result Value Ref Range    PTT 35 22 - 37 sec       Medications   acetaminophen (TYLENOL) tablet 975 mg (0 mg Oral Not Given 3/10/18 1502)       MDM: Jamal is an 85 year old male who presents to the ED complaining of left clavicle pain and left chest pain after falling on the icing and landing on his left side this morning. Patient's blood pressure is elevated at 170/89 mmHg. He is otherwise afebrile with normal vitals upon presentation to the ED. On exam, he exhibits left proximal clavicle tenderness and left posterior chest wall tenderness. Exam is otherwise unremarkable.   X-ray shows a left 10th rib fracture which is consistent to the area of tenderness of the patient.  The radiologist reads the clavicle as an old fracture but my review of it does show a nondisplaced acute fracture of the proximal clavicle.  There is no other significant injuries noted.  I did not CT scan the head as the patient has a normal  neurological exam, is absolutely positive he did not hit his head, has no headache or neck pain and there is no signs of trauma.  We will discharge the patient home with some hydrocodone to use as needed for pain.  Pt will return if the pt worsens.    Assessments & Plan   Left clavical fx, left rib fracture     I have reviewed the nursing notes.    I have reviewed the findings, diagnosis, plan and need for follow up with the patient.      This document serves as a record of services personally performed by Franco Valladares MD. It was created on their behalf by Umm Aguilera, a trained medical scribe. The creation of this record is based on the provider's personal observations and the statements of the patient. This document has been checked and approved by the attending provider.    Note: Chart documentation done in part with Dragon Voice Recognition software. Although reviewed after completion, some word and grammatical errors may remain.    3/10/2018   [facility] EMERGENCY DEPARTMENT     Franco Valladares MD  03/10/18 3874

## 2018-03-10 NOTE — ED AVS SNAPSHOT
[facility] Emergency Department    [address] [place] [address]    Phone:  [phone]    Fax:  [phone]                                       Jamal Francis   MRN: [id number]    Department:  [facility] Emergency Department   Date of Visit:  3/10/2018           After Visit Summary Signature Page     I have received my discharge instructions, and my questions have been answered. I have discussed any challenges I see with this plan with the nurse or doctor.    ..........................................................................................................................................  Patient/Patient Representative Signature      ..........................................................................................................................................  Patient Representative Print Name and Relationship to Patient    ..................................................               ................................................  Date                                            Time    ..........................................................................................................................................  Reviewed by Signature/Title    ...................................................              ..............................................  Date                                                            Time

## 2018-03-13 ENCOUNTER — TELEPHONE (OUTPATIENT)
Dept: FAMILY MEDICINE | Facility: OTHER | Age: 83
End: 2018-03-13

## 2018-03-13 ENCOUNTER — OFFICE VISIT (OUTPATIENT)
Dept: FAMILY MEDICINE | Facility: OTHER | Age: 83
End: 2018-03-13
Payer: COMMERCIAL

## 2018-03-13 VITALS
HEART RATE: 106 BPM | OXYGEN SATURATION: 97 % | SYSTOLIC BLOOD PRESSURE: 112 MMHG | DIASTOLIC BLOOD PRESSURE: 80 MMHG | BODY MASS INDEX: 26.33 KG/M2 | WEIGHT: 163.1 LBS | TEMPERATURE: 98.3 F | RESPIRATION RATE: 20 BRPM

## 2018-03-13 DIAGNOSIS — S22.32XD CLOSED FRACTURE OF ONE RIB OF LEFT SIDE WITH ROUTINE HEALING, SUBSEQUENT ENCOUNTER: ICD-10-CM

## 2018-03-13 DIAGNOSIS — J43.9 PULMONARY EMPHYSEMA, UNSPECIFIED EMPHYSEMA TYPE (H): ICD-10-CM

## 2018-03-13 DIAGNOSIS — I48.20 CHRONIC ATRIAL FIBRILLATION (H): Primary | ICD-10-CM

## 2018-03-13 DIAGNOSIS — Z79.01 LONG-TERM (CURRENT) USE OF ANTICOAGULANTS: ICD-10-CM

## 2018-03-13 LAB
ERYTHROCYTE [DISTWIDTH] IN BLOOD BY AUTOMATED COUNT: 14 % (ref 10–15)
HCT VFR BLD AUTO: 43.4 % (ref 40–53)
HGB BLD-MCNC: 14.7 G/DL (ref 13.3–17.7)
MCH RBC QN AUTO: 28.9 PG (ref 26.5–33)
MCHC RBC AUTO-ENTMCNC: 33.9 G/DL (ref 31.5–36.5)
MCV RBC AUTO: 85 FL (ref 78–100)
PLATELET # BLD AUTO: 233 10E9/L (ref 150–450)
RBC # BLD AUTO: 5.08 10E12/L (ref 4.4–5.9)
WBC # BLD AUTO: 11.3 10E9/L (ref 4–11)

## 2018-03-13 PROCEDURE — 85027 COMPLETE CBC AUTOMATED: CPT | Performed by: INTERNAL MEDICINE

## 2018-03-13 PROCEDURE — 99214 OFFICE O/P EST MOD 30 MIN: CPT | Performed by: INTERNAL MEDICINE

## 2018-03-13 PROCEDURE — 36415 COLL VENOUS BLD VENIPUNCTURE: CPT | Performed by: INTERNAL MEDICINE

## 2018-03-13 RX ORDER — TRAMADOL HYDROCHLORIDE 50 MG/1
50 TABLET ORAL EVERY 8 HOURS PRN
Qty: 21 TABLET | Refills: 0 | Status: SHIPPED | OUTPATIENT
Start: 2018-03-13 | End: 2018-03-15

## 2018-03-13 ASSESSMENT — PAIN SCALES - GENERAL: PAINLEVEL: MODERATE PAIN (5)

## 2018-03-13 NOTE — TELEPHONE ENCOUNTER
Reason for Call:  Same Day Appointment, Requested Provider:  Catarino Landrum DO    PCP: Catarino Landrum    Reason for visit: pain after falling, rib area, collar bone fx    Duration of symptoms: Seen in ED 3/10/18    Have you been treated for this in the past? Yes    Additional comments: had a very rough night, is asking to see Dr Landrum today, please advise.     Can we leave a detailed message on this number? YES    Phone number patient can be reached at: Home number on file [phone] (home)    Best Time: any    Call taken on 3/13/2018 at 8:01 AM by Nava Martinez

## 2018-03-13 NOTE — PROGRESS NOTES
SUBJECTIVE:   Jamal Francis is a 85 year old male who presents to clinic today for the following health issues:    ED/UC Followup:    Facility:  [facility]  Date of visit: 3/10/2018  Reason for visit: Fracture of collar bone  Current Status: having a lot of pain; cannot sleep at night     CHIEF COMPLAINT:    The patient is a pleasant 85-year-old gentleman who recently slipped on the ice and fell. He fractured his left collarbone and a lower left rib laterally. He notes that the pain continues. He does have significant ecchymosis along his lateral flank and chest as a result of his anticoagulation usage. He has a concurrent history of chronic atrial fibrillation. He notes no lightheadedness or symptoms of severe anemia at this time. Denies any chest pain or cardiac nature. Denies any significant shortness of breath or hemoptysis. Has been taking food and fluid adequately. Is compliant with his other medications. Does have underlying pulmonary emphysema and has been using the DuoNeb and albuterol as directed. His pain is managed with a short course of hydrocodone.                       PAST, FAMILY,SOCIAL HISTORY:     Medical  History:   has a past medical history of Contact dermatitis and other eczema, due to unspecified cause; Essential hypertension, benign (6/27/2016); Inguinal hernia without mention of obstruction or gangrene, unilateral or unspecified, (not specified as recurrent); Injury, other and unspecified, unspecified site; Measles without mention of complication; Mumps without mention of complication; Pneumonia in whooping cough(484.3); and Varicella without mention of complication.     Surgical History:   has a past surgical history that includes REPAIR ING HERNIA,5+Y/O,REDUCIBL (1993); cataract iol, rt/lt (7/27/2006); and REMV CATARACT EXTRACAP,INSERT LENS (08/24/06).     Social History:   reports that he has never smoked. He has never used smokeless tobacco. He reports that he drinks alcohol.  He reports that he does not use illicit drugs.     Family History:  family history includes CANCER in his father and paternal grandfather; Respiratory in his brother.            MEDICATIONS  Current Outpatient Prescriptions   Medication Sig Dispense Refill     traMADol (ULTRAM) 50 MG tablet Take 1 tablet (50 mg) by mouth every 8 hours as needed for pain maximum 3 tablet(s) per day 21 tablet 0     HYDROcodone-acetaminophen (NORCO) 5-325 MG per tablet Take 1-2 tablets by mouth every 4 hours as needed for moderate to severe pain 15 tablet 0     warfarin (COUMADIN) 5 MG tablet Take 7.5 mg on Monday, Friday and 5 all other days, or as directed by the coumadin clinic. 110 tablet 3     diltiazem (CARDIZEM) 30 MG tablet Take 0.5 tablets (15 mg) by mouth 2 times daily 180 tablet 3     order for DME Nebulizer machine and tubing/cup 1 Device 0     ipratropium - albuterol 0.5 mg/2.5 mg/3 mL (DUONEB) 0.5-2.5 (3) MG/3ML neb solution Take 1 vial (3 mLs) by nebulization every 6 hours as needed for shortness of breath / dyspnea or wheezing (Patient not taking: Reported on 3/13/2018) 90 vial 1     albuterol (PROAIR HFA, PROVENTIL HFA, VENTOLIN HFA) 108 (90 BASE) MCG/ACT inhaler Inhale 2 puffs into the lungs every 6 hours as needed for shortness of breath / dyspnea or wheezing (Patient not taking: Reported on 3/13/2018) 1 Inhaler 3         --------------------------------------------------------------------------------------------------------------------                          REVIEW OF SYSTEMS:         LUNGS: Pt denies: cough,excess sputum, hemoptysis, or shortness of breath at rest. Does have some dyspnea upon exertion but it is mild.. Does have some musculoskeletal discomfort with breathing.   HEART: Pt denies: chest pain, arrythmia, syncope, tachy or bradyarrhythmia or excess edema.   GI: Pt denies: nausea, vomitting, diarrhea, constipation, melena, or hematochezia.   NEURO: Pt denies: seizures, strokes, diplopia, weakness,  paraesthesias, or paralysis.                          EXAMINATION:         /80  Pulse 106  Temp 98.3  F (36.8  C) (Tympanic)  Resp 20  Wt 163 lb 1.6 oz (74 kg)  SpO2 97%  BMI 26.33 kg/m2   Constitutional: The patient appears to be in no acute distress. The patient appears to be adequately hydrated. No acute respiratory or hemodynamic distress is noted at this time.   LUNGS: Breath sounds are decreased but clear bilaterally, airflow is brisk, no intercostal retraction or stridor is noted. No coughing is noted during visit.   HEART:  regular without rubs, clicks, gallops, or murmurs. PMI is nondisplaced. Upstrokes are brisk. S1,S2 are heard.   GI: Abdomen is soft, without rebound, guarding or tenderness. Bowel sounds are appropriate. No renal bruits are heard.    MS: Minimal crepitance is noted in the extremities. No deformity is present. Muscle strength is appropriate and equal bilaterally. No acute joint erythema or swelling is present. Fairly decent alignment of the clavicle is noted on palpation. X-rays are confirmed.                          DECISION MAKIN. Chronic atrial fibrillation (H)  Continue anticoagulation    2. Closed fracture of one rib of left side with routine healing, subsequent encounter  Will continue tramadol for an additional week.  Recommend moist heat to the anterior chest and clavicle area  - traMADol (ULTRAM) 50 MG tablet; Take 1 tablet (50 mg) by mouth every 8 hours as needed for pain maximum 3 tablet(s) per day  Dispense: 21 tablet; Refill: 0    3. Long-term (current) use of anticoagulants [Z79.01]  Follow INR as scheduled  Check CBC to rule out secondary anemia  - CBC with platelets    4. Pulmonary emphysema, unspecified emphysema type (H)  Controlled at this time.                               FOLLOW UP    I have asked the patient to make an appointment for follow up with me as predicated by his lab results.        I have carefully explained the diagnosis and  treatment options with the patient. The patient has displayed an understanding of the above, and all subsequent questions were answered.         DO JENN Bentley    Portions of this note were produced using Pansieve  Although every attempt at real-time proof reading has been made, occasional grammar/syntax errors may have been missed.

## 2018-03-13 NOTE — NURSING NOTE
"Chief Complaint   Patient presents with     ER F/U     [facility]; 3/10/2018-fractured collar bone       Initial /80  Pulse 106  Temp 98.3  F (36.8  C) (Tympanic)  Resp 20  Wt 163 lb 1.6 oz (74 kg)  SpO2 97%  BMI 26.33 kg/m2 Estimated body mass index is 26.33 kg/(m^2) as calculated from the following:    Height as of 1/6/17: 5' 6\" (1.676 m).    Weight as of this encounter: 163 lb 1.6 oz (74 kg).  Medication Reconciliation: complete  Pita Tejeda CMA (AAMA)    "

## 2018-03-13 NOTE — TELEPHONE ENCOUNTER
I have contacted pt and scheduled him for today at 11 am. Pita Tejeda CMA ([facility])

## 2018-03-13 NOTE — MR AVS SNAPSHOT
"              After Visit Summary   3/13/2018    Jamal Francis    MRN: [id number]           Patient Information     Date Of Birth          3/18/1932        Visit Information        Provider Department      3/13/2018 11:00 AM Catarino Landrum DO [facility]        Today's Diagnoses     Chronic atrial fibrillation (H)    -  1    Closed fracture of one rib of left side with routine healing, subsequent encounter        Long-term (current) use of anticoagulants [Z79.01]        Pulmonary emphysema, unspecified emphysema type (H)           Follow-ups after your visit        Follow-up notes from your care team     Return in about 2 weeks (around 3/27/2018).      Who to contact     If you have questions or need follow up information about today's clinic visit or your schedule please contact [facility] directly at [phone].  Normal or non-critical lab and imaging results will be communicated to you by Simple-Fillhart, letter or phone within 4 business days after the clinic has received the results. If you do not hear from us within 7 days, please contact the clinic through MyChart or phone. If you have a critical or abnormal lab result, we will notify you by phone as soon as possible.  Submit refill requests through [email or web address] or call your pharmacy and they will forward the refill request to us. Please allow 3 business days for your refill to be completed.          Additional Information About Your Visit        MyChart Information     [email or web address] lets you send messages to your doctor, view your test results, renew your prescriptions, schedule appointments and more. To sign up, go to [email or web address] . Click on \"Log in\" on the left side of the screen, which will take you to the Welcome page. Then click on \"Sign up Now\" on the right side of the page.     You will be asked to enter the access code listed below, as well as some personal information. Please follow the directions to create your " username and password.     Your access code is: [email or web address]  Expires: 2018  5:29 PM     Your access code will  in 90 days. If you need help or a new code, please call your [facility] or [phone].        Care EveryWhere ID     This is your Care EveryWhere ID. This could be used by other organizations to access your [facility] medical records          Your Vitals Were     Pulse Temperature Respirations Pulse Oximetry BMI (Body Mass Index)       106 98.3  F (36.8  C) (Tympanic) 20 97% 26.33 kg/m2        Blood Pressure from Last 3 Encounters:   18 112/80   03/10/18 152/78   18 116/69    Weight from Last 3 Encounters:   18 163 lb 1.6 oz (74 kg)   03/10/18 160 lb (72.6 kg)   17 164 lb 8 oz (74.6 kg)              We Performed the Following     CBC with platelets          Today's Medication Changes          These changes are accurate as of 3/13/18 11:59 PM.  If you have any questions, ask your nurse or doctor.               Start taking these medicines.        Dose/Directions    traMADol 50 MG tablet   Commonly known as:  ULTRAM   Used for:  Closed fracture of one rib of left side with routine healing, subsequent encounter   Started by:  Catarino Landrum DO        Dose:  50 mg   Take 1 tablet (50 mg) by mouth every 8 hours as needed for pain maximum 3 tablet(s) per day   Quantity:  21 tablet   Refills:  0            Where to get your medicines      Some of these will need a paper prescription and others can be bought over the counter.  Ask your nurse if you have questions.     Bring a paper prescription for each of these medications     traMADol 50 MG tablet                Primary Care Provider Office Phone # Fax #    Catarino Landrum [phone]       [address] [place] [address]        Equal Access to Services     ERNESTO GAMBINO AH: Steven Gaspar, arabella reyes, tiesha rouse  ashley phillipspiperjenni galdamez'aawilbur ah. So [facility] [phone].    ATENCIÓN: Si nolanla trisha, tiene a singleton disposición servicios gratuitos de asistencia lingüística. Wang al [phone].    We comply with applicable federal civil rights laws and [place] laws. We do not discriminate on the basis of race, color, national origin, age, disability, sex, sexual orientation, or gender identity.            Thank you!     Thank you for choosing [facility]  for your care. Our goal is always to provide you with excellent care. Hearing back from our patients is one way we can continue to improve our services. Please take a few minutes to complete the written survey that you may receive in the mail after your visit with us. Thank you!             Your Updated Medication List - Protect others around you: Learn how to safely use, store and throw away your medicines at www.disposemymeds.org.          This list is accurate as of 3/13/18 11:59 PM.  Always use your most recent med list.                   Brand Name Dispense Instructions for use Diagnosis    albuterol 108 (90 BASE) MCG/ACT Inhaler    PROAIR HFA/PROVENTIL HFA/VENTOLIN HFA    1 Inhaler    Inhale 2 puffs into the lungs every 6 hours as needed for shortness of breath / dyspnea or wheezing    Pulmonary emphysema, unspecified emphysema type (H), Chronic atrial fibrillation (H)       diltiazem 30 MG tablet    CARDIZEM    180 tablet    Take 0.5 tablets (15 mg) by mouth 2 times daily    Chronic atrial fibrillation (H)       HYDROcodone-acetaminophen 5-325 MG per tablet    NORCO    15 tablet    Take 1-2 tablets by mouth every 4 hours as needed for moderate to severe pain        ipratropium - albuterol 0.5 mg/2.5 mg/3 mL 0.5-2.5 (3) MG/3ML neb solution    DUONEB    90 vial    Take 1 vial (3 mLs) by nebulization every 6 hours as needed for shortness of breath / dyspnea or wheezing    COPD exacerbation (H)       order for DME     1 Device    Nebulizer machine and tubing/cup    COPD  exacerbation (H)       traMADol 50 MG tablet    ULTRAM    21 tablet    Take 1 tablet (50 mg) by mouth every 8 hours as needed for pain maximum 3 tablet(s) per day    Closed fracture of one rib of left side with routine healing, subsequent encounter       warfarin 5 MG tablet    COUMADIN    110 tablet    Take 7.5 mg on Monday, Friday and 5 all other days, or as directed by the coumadin clinic.    Persistent atrial fibrillation (H)

## 2018-03-14 ENCOUNTER — NURSE TRIAGE (OUTPATIENT)
Dept: NURSING | Facility: CLINIC | Age: 83
End: 2018-03-14

## 2018-03-14 DIAGNOSIS — S22.32XD CLOSED FRACTURE OF ONE RIB OF LEFT SIDE WITH ROUTINE HEALING, SUBSEQUENT ENCOUNTER: ICD-10-CM

## 2018-03-14 NOTE — TELEPHONE ENCOUNTER
Clinic Action Needed:Yes, follow up with Jamal    Reason for Call: Jamal's wife Mariama called (Jamal gave me permission to speak to his wife). Jamal was given an Rx for Tramadol to control pain from recent fractured ribs and collar bone.  They picked up Rx earlier today at pharmacy and after taking one pill, they lost the medication bottle.  They've looked everywhere and can't locate and he is having uncontrolled pain.  Paged on call provider for [facility] to speak to me at [facility].  Dr. Damico is on call, page sent @ 6:08 pm via smart web.  Dr. Damico returned page and advised that due to his atrial fib and longtime use of warfarin he could use Tylenol not to exceed 4000 mg in 24 hour period.  Can go to UC/ER to see provider in person to discuss replacement Rx or additional pain medication. Attempted to call Jamal and wife several times to relay care advice from provider, phone is continually busy. Please return call to follow up with Jamal.  Thank you.     Routed to: Arely Team    Julia Balderas RN  [facility]

## 2018-03-15 RX ORDER — TRAMADOL HYDROCHLORIDE 50 MG/1
50 TABLET ORAL EVERY 8 HOURS PRN
Qty: 21 TABLET | Refills: 0 | Status: SHIPPED | OUTPATIENT
Start: 2018-03-15 | End: 2019-06-24

## 2018-03-15 NOTE — TELEPHONE ENCOUNTER
Based on my knowledge of this patient and his recent incident, I have no concerns regarding fraudulent activity or drug-seeking behavior.. A refill prescription for the short dose of medication has been created.  Please send this to his pharmacy and notify him that this has been taken care of.    Lucita

## 2018-04-20 ENCOUNTER — ANTICOAGULATION THERAPY VISIT (OUTPATIENT)
Dept: ANTICOAGULATION | Facility: CLINIC | Age: 83
End: 2018-04-20
Payer: COMMERCIAL

## 2018-04-20 VITALS — SYSTOLIC BLOOD PRESSURE: 131 MMHG | DIASTOLIC BLOOD PRESSURE: 69 MMHG | HEART RATE: 88 BPM

## 2018-04-20 DIAGNOSIS — I48.20 CHRONIC ATRIAL FIBRILLATION (H): ICD-10-CM

## 2018-04-20 DIAGNOSIS — Z79.01 LONG-TERM (CURRENT) USE OF ANTICOAGULANTS: ICD-10-CM

## 2018-04-20 LAB — INR POINT OF CARE: 3 (ref 0.86–1.14)

## 2018-04-20 PROCEDURE — 36416 COLLJ CAPILLARY BLOOD SPEC: CPT

## 2018-04-20 PROCEDURE — 85610 PROTHROMBIN TIME: CPT | Mod: QW

## 2018-04-20 PROCEDURE — 99207 ZZC NO CHARGE NURSE ONLY: CPT

## 2018-04-20 NOTE — PROGRESS NOTES
ANTICOAGULATION FOLLOW-UP CLINIC VISIT    Patient Name:  Jamal Francis  Date:  4/20/2018  Contact Type:  Face to Face    SUBJECTIVE:     Patient Findings     Positives Change in diet/appetite (less salads this week), No Problem Findings    Comments Pt does not like to make an appt because him and his wife are unsure of their schedules 6 weeks out. They are both aware that he is due around the beginning on June.              OBJECTIVE    INR Protime   Date Value Ref Range Status   04/20/2018 3.0 (A) 0.86 - 1.14 Final       ASSESSMENT / PLAN  INR assessment THER    Recheck INR In: 6 WEEKS    INR Location Clinic      Anticoagulation Summary as of 4/20/2018     INR goal 2.0-3.0   Today's INR 3.0   Maintenance plan 7.5 mg (5 mg x 1.5) on Mon, Fri; 5 mg (5 mg x 1) all other days   Full instructions 7.5 mg on Mon, Fri; 5 mg all other days   Weekly total 40 mg   No change documented Livia Cassidy RN   Plan last modified Livia Cassidy RN (11/7/2017)   Next INR check 6/1/2018   Target end date     Indications   Atrial fibrillation (H) [I48.91]  Long-term (current) use of anticoagulants [Z79.01] [Z79.01]         Anticoagulation Episode Summary     INR check location     Preferred lab     Send INR reminders to [facility]    Comments 5 mg tablets         Anticoagulation Care Providers     Provider Role Specialty Phone number    Krystle Goodwin RN Responsible              See the Encounter Report to view Anticoagulation Flowsheet and Dosing Calendar (Go to Encounters tab in chart review, and find the Anticoagulation Therapy Visit)    Dosage adjustment made based on physician directed care plan.      Livia Cassidy RN

## 2018-04-20 NOTE — MR AVS SNAPSHOT
Jamal Francis   4/20/2018 2:00 PM   Anticoagulation Therapy Visit    Description:  86 year old male   Provider:  [facility]   Department:  [facility]           INR as of 4/20/2018     Today's INR 3.0      Anticoagulation Summary as of 4/20/2018     INR goal 2.0-3.0   Today's INR 3.0   Full instructions 7.5 mg on Mon, Fri; 5 mg all other days   Next INR check 6/1/2018    Indications   Atrial fibrillation (H) [I48.91]  Long-term (current) use of anticoagulants [Z79.01] [Z79.01]         Contact Numbers     Clinic Number:         April 2018 Details    Sun Mon Tue Wed Thu Fri Sat     1               2               3               4               5               6               7                 8               9               10               11               12               13               14                 15               16               17               18               19               20      7.5 mg   See details      21      5 mg           22      5 mg         23      7.5 mg         24      5 mg         25      5 mg         26      5 mg         27      7.5 mg         28      5 mg           29      5 mg         30      7.5 mg               Date Details   04/20 This INR check               How to take your warfarin dose     To take:  5 mg Take 1 of the 5 mg tablets.    To take:  7.5 mg Take 1.5 of the 5 mg tablets.           May 2018 Details    Sun Mon Tue Wed Thu Fri Sat       1      5 mg         2      5 mg         3      5 mg         4      7.5 mg         5      5 mg           6      5 mg         7      7.5 mg         8      5 mg         9      5 mg         10      5 mg         11      7.5 mg         12      5 mg           13      5 mg         14      7.5 mg         15      5 mg         16      5 mg         17      5 mg         18      7.5 mg         19      5 mg           20      5 mg         21      7.5 mg         22      5 mg         23      5 mg         24      5 mg         25      7.5 mg          26      5 mg           27      5 mg         28      7.5 mg         29      5 mg         30      5 mg         31      5 mg            Date Details   No additional details            How to take your warfarin dose     To take:  5 mg Take 1 of the 5 mg tablets.    To take:  7.5 mg Take 1.5 of the 5 mg tablets.           June 2018 Details    Sun Mon Tue Wed Thu Fri Sat          1            2                 3               4               5               6               7               8               9                 10               11               12               13               14               15               16                 17               18               19               20               21               22               23                 24               25               26               27               28               29               30                Date Details   No additional details    Date of next INR:  6/1/2018         How to take your warfarin dose     To take:  7.5 mg Take 1.5 of the 5 mg tablets.

## 2018-06-25 ENCOUNTER — ANTICOAGULATION THERAPY VISIT (OUTPATIENT)
Dept: ANTICOAGULATION | Facility: CLINIC | Age: 83
End: 2018-06-25
Payer: COMMERCIAL

## 2018-06-25 VITALS — SYSTOLIC BLOOD PRESSURE: 122 MMHG | HEART RATE: 76 BPM | DIASTOLIC BLOOD PRESSURE: 79 MMHG

## 2018-06-25 DIAGNOSIS — I48.20 CHRONIC ATRIAL FIBRILLATION (H): ICD-10-CM

## 2018-06-25 DIAGNOSIS — Z79.01 LONG-TERM (CURRENT) USE OF ANTICOAGULANTS: ICD-10-CM

## 2018-06-25 LAB — INR POINT OF CARE: 3.2 (ref 0.86–1.14)

## 2018-06-25 PROCEDURE — 99207 ZZC NO CHARGE NURSE ONLY: CPT

## 2018-06-25 PROCEDURE — 36416 COLLJ CAPILLARY BLOOD SPEC: CPT

## 2018-06-25 PROCEDURE — 85610 PROTHROMBIN TIME: CPT | Mod: QW

## 2018-06-25 NOTE — MR AVS SNAPSHOT
Jamal SNIDER Penny   6/25/2018 10:45 AM   Anticoagulation Therapy Visit    Description:  86 year old male   Provider:  [facility]   Department:  [facility]           INR as of 6/25/2018     Today's INR 3.2!      Anticoagulation Summary as of 6/25/2018     INR goal 2.0-3.0   Today's INR 3.2!   Full warfarin instructions 7.5 mg on Mon, Fri; 5 mg all other days   Next INR check 8/8/2018    Indications   Atrial fibrillation (H) [I48.91]  Long-term (current) use of anticoagulants [Z79.01] [Z79.01]         Contact Numbers     Clinic Number:         June 2018 Details    Sun Mon Tue Wed Thu Fri Sat          1               2                 3               4               5               6               7               8               9                 10               11               12               13               14               15               16                 17               18               19               20               21               22               23                 24               25      7.5 mg   See details      26      5 mg         27      5 mg         28      5 mg         29      7.5 mg         30      5 mg          Date Details   06/25 This INR check               How to take your warfarin dose     To take:  5 mg Take 1 of the 5 mg tablets.    To take:  7.5 mg Take 1.5 of the 5 mg tablets.           July 2018 Details    Sun Mon Tue Wed Thu Fri Sat     1      5 mg         2      7.5 mg         3      5 mg         4      5 mg         5      5 mg         6      7.5 mg         7      5 mg           8      5 mg         9      7.5 mg         10      5 mg         11      5 mg         12      5 mg         13      7.5 mg         14      5 mg           15      5 mg         16      7.5 mg         17      5 mg         18      5 mg         19      5 mg         20      7.5 mg         21      5 mg           22      5 mg         23      7.5 mg         24      5 mg         25      5 mg         26      5 mg          27      7.5 mg         28      5 mg           29      5 mg         30      7.5 mg         31      5 mg              Date Details   No additional details            How to take your warfarin dose     To take:  5 mg Take 1 of the 5 mg tablets.    To take:  7.5 mg Take 1.5 of the 5 mg tablets.           August 2018 Details    Sun Mon Tue Wed Thu Fri Sat        1      5 mg         2      5 mg         3      7.5 mg         4      5 mg           5      5 mg         6      7.5 mg         7      5 mg         8            9               10               11                 12               13               14               15               16               17               18                 19               20               21               22               23               24               25                 26               27               28               29               30               31                 Date Details   No additional details    Date of next INR:  8/8/2018         How to take your warfarin dose     To take:  5 mg Take 1 of the 5 mg tablets.    To take:  7.5 mg Take 1.5 of the 5 mg tablets.

## 2018-09-07 ENCOUNTER — ANTICOAGULATION THERAPY VISIT (OUTPATIENT)
Dept: ANTICOAGULATION | Facility: OTHER | Age: 83
End: 2018-09-07
Payer: COMMERCIAL

## 2018-09-07 DIAGNOSIS — Z79.01 LONG-TERM (CURRENT) USE OF ANTICOAGULANTS: ICD-10-CM

## 2018-09-07 DIAGNOSIS — I48.91 ATRIAL FIBRILLATION, UNSPECIFIED TYPE (H): ICD-10-CM

## 2018-09-07 LAB — INR POINT OF CARE: 2.2 (ref 0.86–1.14)

## 2018-09-07 PROCEDURE — 99207 ZZC NO CHARGE NURSE ONLY: CPT

## 2018-09-07 PROCEDURE — 85610 PROTHROMBIN TIME: CPT | Mod: QW

## 2018-09-07 PROCEDURE — 36416 COLLJ CAPILLARY BLOOD SPEC: CPT

## 2018-09-07 NOTE — MR AVS SNAPSHOT
Jamal Francis   9/7/2018 9:30 AM   Anticoagulation Therapy Visit    Description:  86 year old male   Provider:  [facility]   Department:  [facility]           INR as of 9/7/2018     Today's INR 2.2      Anticoagulation Summary as of 9/7/2018     INR goal 2.0-3.0   Today's INR 2.2   Full warfarin instructions 7.5 mg on Mon, Fri; 5 mg all other days   Next INR check 10/19/2018    Indications   Atrial fibrillation (H) [I48.91]  Long-term (current) use of anticoagulants [Z79.01] [Z79.01]         Contact Numbers     Clinic Number:         September 2018 Details    Sun Mon Tue Wed Thu Fri Sat           1                 2               3               4               5               6               7      7.5 mg   See details      8      5 mg           9      5 mg         10      7.5 mg         11      5 mg         12      5 mg         13      5 mg         14      7.5 mg         15      5 mg           16      5 mg         17      7.5 mg         18      5 mg         19      5 mg         20      5 mg         21      7.5 mg         22      5 mg           23      5 mg         24      7.5 mg         25      5 mg         26      5 mg         27      5 mg         28      7.5 mg         29      5 mg           30      5 mg                Date Details   09/07 This INR check               How to take your warfarin dose     To take:  5 mg Take 1 of the 5 mg tablets.    To take:  7.5 mg Take 1.5 of the 5 mg tablets.           October 2018 Details    Sun Mon Tue Wed Thu Fri Sat      1      7.5 mg         2      5 mg         3      5 mg         4      5 mg         5      7.5 mg         6      5 mg           7      5 mg         8      7.5 mg         9      5 mg         10      5 mg         11      5 mg         12      7.5 mg         13      5 mg           14      5 mg         15      7.5 mg         16      5 mg         17      5 mg         18      5 mg         19            20                 21               22               23                24               25               26               27                 28               29               30               31                   Date Details   No additional details    Date of next INR:  10/19/2018         How to take your warfarin dose     To take:  5 mg Take 1 of the 5 mg tablets.    To take:  7.5 mg Take 1.5 of the 5 mg tablets.

## 2018-09-07 NOTE — PROGRESS NOTES
ANTICOAGULATION FOLLOW-UP CLINIC VISIT    Patient Name:  Jamal Francis  Date:  9/7/2018  Contact Type:  Face to Face    SUBJECTIVE:     Patient Findings     Positives No Problem Findings           OBJECTIVE    INR Protime   Date Value Ref Range Status   09/07/2018 2.2 (A) 0.86 - 1.14 Final       ASSESSMENT / PLAN  INR assessment THER    Recheck INR In: 6 WEEKS    INR Location Clinic      Anticoagulation Summary as of 9/7/2018     INR goal 2.0-3.0   Today's INR 2.2   Warfarin maintenance plan 7.5 mg (5 mg x 1.5) on Mon, Fri; 5 mg (5 mg x 1) all other days   Full warfarin instructions 7.5 mg on Mon, Fri; 5 mg all other days   Weekly warfarin total 40 mg   No change documented Marilee Feliciano RN   Plan last modified Livia Cassidy RN (11/7/2017)   Next INR check 10/19/2018   Target end date     Indications   Atrial fibrillation (H) [I48.91]  Long-term (current) use of anticoagulants [Z79.01] [Z79.01]         Anticoagulation Episode Summary     INR check location     Preferred lab     Send INR reminders to [facility]    Comments 5 mg tablets, dosing card, pt does not like to make an appt during INR check- just let him know when he is due next and he will call and schedule an appt around that time.         Anticoagulation Care Providers     Provider Role Specialty Phone number    Krystle Goodwin RN Responsible              See the Encounter Report to view Anticoagulation Flowsheet and Dosing Calendar (Go to Encounters tab in chart review, and find the Anticoagulation Therapy Visit)    Dosage adjustment made based on physician directed care plan.    Marilee Feliciano RN

## 2018-10-23 ENCOUNTER — ANTICOAGULATION THERAPY VISIT (OUTPATIENT)
Dept: ANTICOAGULATION | Facility: CLINIC | Age: 83
End: 2018-10-23
Payer: COMMERCIAL

## 2018-10-23 VITALS — SYSTOLIC BLOOD PRESSURE: 125 MMHG | DIASTOLIC BLOOD PRESSURE: 68 MMHG | HEART RATE: 73 BPM

## 2018-10-23 DIAGNOSIS — I48.91 ATRIAL FIBRILLATION (H): ICD-10-CM

## 2018-10-23 LAB — INR POINT OF CARE: 1.8 (ref 0.86–1.14)

## 2018-10-23 PROCEDURE — 99207 ZZC NO CHARGE NURSE ONLY: CPT

## 2018-10-23 PROCEDURE — 85610 PROTHROMBIN TIME: CPT | Mod: QW

## 2018-10-23 PROCEDURE — 36416 COLLJ CAPILLARY BLOOD SPEC: CPT

## 2018-10-23 NOTE — PROGRESS NOTES
ANTICOAGULATION FOLLOW-UP CLINIC VISIT    Patient Name:  Jamal Francis  Date:  10/23/2018  Contact Type:  Face to Face    SUBJECTIVE:     Patient Findings     Positives Change in diet/appetite (pt started drinking shakes that have cabbage in them. )    Comments Pt does not like to make appts, but likes to know when he should come back and then will make an appt around that time             OBJECTIVE    INR Protime   Date Value Ref Range Status   10/23/2018 1.8 (A) 0.86 - 1.14 Final       ASSESSMENT / PLAN  INR assessment SUB    Recheck INR In: 2 WEEKS    INR Location Clinic      Anticoagulation Summary as of 10/23/2018     INR goal 2.0-3.0   Today's INR 1.8!   Warfarin maintenance plan 7.5 mg (5 mg x 1.5) on Mon, Wed, Fri; 5 mg (5 mg x 1) all other days   Full warfarin instructions 7.5 mg on Mon, Wed, Fri; 5 mg all other days   Weekly warfarin total 42.5 mg   Plan last modified Livia Cassidy RN (10/23/2018)   Next INR check 11/5/2018   Target end date     Indications   Atrial fibrillation (H) [I48.91]  Long-term (current) use of anticoagulants [Z79.01] [Z79.01]         Anticoagulation Episode Summary     INR check location     Preferred lab     Send INR reminders to [facility]    Comments 5 mg tablets, dosing card, pt does not like to make an appt during INR check- just let him know when he is due next and he will call and schedule an appt around that time.         Anticoagulation Care Providers     Provider Role Specialty Phone number    Krystle Goodwin RN Responsible              See the Encounter Report to view Anticoagulation Flowsheet and Dosing Calendar (Go to Encounters tab in chart review, and find the Anticoagulation Therapy Visit)    Dosage adjustment made based on physician directed care plan.      Livia Cassidy, BRIANA

## 2018-10-23 NOTE — MR AVS SNAPSHOT
Jamal SNIDER Penny   10/23/2018 9:45 AM   Anticoagulation Therapy Visit    Description:  86 year old male   Provider:  [facility]   Department:  [facility]           INR as of 10/23/2018     Today's INR 1.8!      Anticoagulation Summary as of 10/23/2018     INR goal 2.0-3.0   Today's INR 1.8!   Full warfarin instructions 7.5 mg on Mon, Wed, Fri; 5 mg all other days   Next INR check 11/5/2018    Indications   Atrial fibrillation (H) [I48.91]  Long-term (current) use of anticoagulants [Z79.01] [Z79.01]         Contact Numbers     Clinic Number:         October 2018 Details    Sun Mon Tue Wed Thu Fri Sat      1               2               3               4               5               6                 7               8               9               10               11               12               13                 14               15               16               17               18               19               20                 21               22               23      5 mg   See details      24      7.5 mg         25      5 mg         26      7.5 mg         27      5 mg           28      5 mg         29      7.5 mg         30      5 mg         31      7.5 mg             Date Details   10/23 This INR check               How to take your warfarin dose     To take:  5 mg Take 1 of the 5 mg tablets.    To take:  7.5 mg Take 1.5 of the 5 mg tablets.           November 2018 Details    Sun Mon Tue Wed Thu Fri Sat         1      5 mg         2      7.5 mg         3      5 mg           4      5 mg         5            6               7               8               9               10                 11               12               13               14               15               16               17                 18               19               20               21               22               23               24                 25               26               27               28               29                30                 Date Details   No additional details    Date of next INR:  11/5/2018         How to take your warfarin dose     To take:  5 mg Take 1 of the 5 mg tablets.    To take:  7.5 mg Take 1.5 of the 5 mg tablets.

## 2018-11-19 ENCOUNTER — TELEPHONE (OUTPATIENT)
Dept: ANTICOAGULATION | Facility: CLINIC | Age: 83
End: 2018-11-19

## 2018-11-19 ENCOUNTER — ANTICOAGULATION THERAPY VISIT (OUTPATIENT)
Dept: ANTICOAGULATION | Facility: CLINIC | Age: 83
End: 2018-11-19
Payer: COMMERCIAL

## 2018-11-19 VITALS — SYSTOLIC BLOOD PRESSURE: 131 MMHG | DIASTOLIC BLOOD PRESSURE: 81 MMHG | HEART RATE: 82 BPM

## 2018-11-19 DIAGNOSIS — I48.20 CHRONIC ATRIAL FIBRILLATION (H): Primary | ICD-10-CM

## 2018-11-19 DIAGNOSIS — I48.20 CHRONIC ATRIAL FIBRILLATION (H): ICD-10-CM

## 2018-11-19 DIAGNOSIS — I48.91 ATRIAL FIBRILLATION (H): ICD-10-CM

## 2018-11-19 LAB — INR POINT OF CARE: 2.1 (ref 0.86–1.14)

## 2018-11-19 PROCEDURE — 99207 ZZC NO CHARGE NURSE ONLY: CPT

## 2018-11-19 PROCEDURE — 85610 PROTHROMBIN TIME: CPT | Mod: QW

## 2018-11-19 PROCEDURE — 36416 COLLJ CAPILLARY BLOOD SPEC: CPT

## 2018-11-19 NOTE — PROGRESS NOTES
ANTICOAGULATION FOLLOW-UP CLINIC VISIT    Patient Name:  Jamal Francis  Date:  11/19/2018  Contact Type:  Face to Face    SUBJECTIVE:     Patient Findings     Positives Med error (pt has been taking 7.5 mg Mon, Fri and 5 mg ROW. )           OBJECTIVE    INR Protime   Date Value Ref Range Status   11/19/2018 2.1 (A) 0.86 - 1.14 Final       ASSESSMENT / PLAN  INR assessment THER    Recheck INR In: 6 WEEKS    INR Location Clinic      Anticoagulation Summary as of 11/19/2018     INR goal 2.0-3.0   Today's INR 2.1   Warfarin maintenance plan 7.5 mg (5 mg x 1.5) on Mon, Fri; 5 mg (5 mg x 1) all other days   Full warfarin instructions 7.5 mg on Mon, Fri; 5 mg all other days   Weekly warfarin total 40 mg   Plan last modified Livia Cassidy RN (11/19/2018)   Next INR check 12/31/2018   Target end date     Indications   Atrial fibrillation (H) [I48.91]  Long-term (current) use of anticoagulants [Z79.01] [Z79.01]         Anticoagulation Episode Summary     INR check location     Preferred lab     Send INR reminders to [facility]    Comments 5 mg tablets, dosing card, pt does not like to make an appt during INR check- just let him know when he is due next and he will call and schedule an appt around that time.         Anticoagulation Care Providers     Provider Role Specialty Phone number    Krystle Goodwin, RN Responsible              See the Encounter Report to view Anticoagulation Flowsheet and Dosing Calendar (Go to Encounters tab in chart review, and find the Anticoagulation Therapy Visit)    Dosage adjustment made based on physician directed care plan.    Livia Cassidy RN

## 2018-11-19 NOTE — TELEPHONE ENCOUNTER
Please review and renew this patients INR referral, orders pending. Thank you!      Has the patient previously taken warfarin? yes  If yes, for what indication?  A Fib    Does the patient have any of the following indications for a higher range of 2.5-3.5:    Mitral position mechanical valve? no    Hollie-Shiley, Ball and Cage or Monoleaflet valve (regardless of position) no    Other (if yes, please explain) no    Livia Cassidy RN

## 2018-11-19 NOTE — MR AVS SNAPSHOT
Jamal AGATHA Francis   11/19/2018 2:15 PM   Anticoagulation Therapy Visit    Description:  86 year old male   Provider:  [facility]   Department:  [facility]           INR as of 11/19/2018     Today's INR 2.1      Anticoagulation Summary as of 11/19/2018     INR goal 2.0-3.0   Today's INR 2.1   Full warfarin instructions 7.5 mg on Mon, Fri; 5 mg all other days   Next INR check 12/31/2018    Indications   Atrial fibrillation (H) [I48.91]  Long-term (current) use of anticoagulants [Z79.01] [Z79.01]         Contact Numbers     Clinic Number:         November 2018 Details    Sun Mon Tue Wed Thu Fri Sat         1               2               3                 4               5               6               7               8               9               10                 11               12               13               14               15               16               17                 18               19      7.5 mg   See details      20      5 mg         21      5 mg         22      5 mg         23      7.5 mg         24      5 mg           25      5 mg         26      7.5 mg         27      5 mg         28      5 mg         29      5 mg         30      7.5 mg           Date Details   11/19 This INR check               How to take your warfarin dose     To take:  5 mg Take 1 of the 5 mg tablets.    To take:  7.5 mg Take 1.5 of the 5 mg tablets.           December 2018 Details    Sun Mon Tue Wed Thu Fri Sat           1      5 mg           2      5 mg         3      7.5 mg         4      5 mg         5      5 mg         6      5 mg         7      7.5 mg         8      5 mg           9      5 mg         10      7.5 mg         11      5 mg         12      5 mg         13      5 mg         14      7.5 mg         15      5 mg           16      5 mg         17      7.5 mg         18      5 mg         19      5 mg         20      5 mg         21      7.5 mg         22      5 mg           23      5 mg         24      7.5  mg         25      5 mg         26      5 mg         27      5 mg         28      7.5 mg         29      5 mg           30      5 mg         31                  Date Details   No additional details    Date of next INR:  12/31/2018         How to take your warfarin dose     To take:  5 mg Take 1 of the 5 mg tablets.    To take:  7.5 mg Take 1.5 of the 5 mg tablets.

## 2018-11-20 NOTE — TELEPHONE ENCOUNTER
"Requested Prescriptions   Pending Prescriptions Disp Refills     diltiazem (CARDIZEM) 30 MG tablet [Pharmacy Med Name: DILTIAZEM 30MG      TAB] 90 tablet 5    Last Written Prescription Date:  9/6/17  Last Fill Quantity: 180,  # refills: 3   Last office visit: 3/13/2018    Future Office Visit:  None   Sig: TAKE 1/2 (ONE-HALF) TABLET BY MOUTH TWICE DAILY    Calcium Channel Blockers Protocol  Failed    11/19/2018  1:18 PM       Failed - Normal ALT in past 12 months    Recent Labs   Lab Test  05/19/17   1310   ALT  29          Failed - Normal serum creatinine on file in past 12 months    Recent Labs   Lab Test  05/20/16   0808   CR  1.01          Passed - Blood pressure under 140/90 in past 12 months    BP Readings from Last 3 Encounters:   11/19/18 131/81   10/23/18 125/68   06/25/18 122/79          Passed - Recent (12 mo) or future (30 days) visit within the authorizing provider's specialty    Patient had office visit in the last 12 months or has a visit in the next 30 days with authorizing provider or within the authorizing provider's specialty.  See \"Patient Info\" tab in inbasket, or \"Choose Columns\" in Meds & Orders section of the refill encounter.           Passed - Patient is age 18 or older        Routing refill request to provider for review/approval because:  Labs not current:  ALT, CR    Flor Mcpherson R.N. Primary Care            "

## 2018-11-26 RX ORDER — DILTIAZEM HYDROCHLORIDE 30 MG/1
TABLET, FILM COATED ORAL
Qty: 90 TABLET | Refills: 1 | Status: SHIPPED | OUTPATIENT
Start: 2018-11-26 | End: 2019-05-05

## 2019-01-02 ENCOUNTER — ANTICOAGULATION THERAPY VISIT (OUTPATIENT)
Dept: ANTICOAGULATION | Facility: CLINIC | Age: 84
End: 2019-01-02

## 2019-01-02 ENCOUNTER — TELEPHONE (OUTPATIENT)
Dept: ANTICOAGULATION | Facility: CLINIC | Age: 84
End: 2019-01-02

## 2019-01-02 DIAGNOSIS — I48.20 CHRONIC ATRIAL FIBRILLATION (H): ICD-10-CM

## 2019-01-02 DIAGNOSIS — Z79.01 LONG TERM CURRENT USE OF ANTICOAGULANTS WITH INR GOAL OF 2.0-3.0: Primary | ICD-10-CM

## 2019-01-02 DIAGNOSIS — Z79.01 LONG TERM CURRENT USE OF ANTICOAGULANTS WITH INR GOAL OF 2.0-3.0: ICD-10-CM

## 2019-01-02 LAB — INR BLD: 1.9 (ref 0.86–1.14)

## 2019-01-02 PROCEDURE — 36416 COLLJ CAPILLARY BLOOD SPEC: CPT

## 2019-01-02 PROCEDURE — 85610 PROTHROMBIN TIME: CPT | Mod: QW | Performed by: INTERNAL MEDICINE

## 2019-01-02 PROCEDURE — 85610 PROTHROMBIN TIME: CPT | Mod: QW

## 2019-01-02 PROCEDURE — 99207 ZZC NO CHARGE NURSE ONLY: CPT | Performed by: INTERNAL MEDICINE

## 2019-01-02 NOTE — TELEPHONE ENCOUNTER
I have attempted to contact this patient by phone, unable to leave message, phone busy.  Will try again later.    Miladys Bentley, RN- [facility] RN

## 2019-01-03 NOTE — PROGRESS NOTES
ANTICOAGULATION FOLLOW-UP CLINIC VISIT    Patient Name:  Jamal Francis  Date:  1/3/2019  Contact Type:  Telephone    SUBJECTIVE:     Patient Findings     Positives:   No Problem Findings           OBJECTIVE    INR Point of Care   Date Value Ref Range Status   2019 1.9 (H) 0.86 - 1.14 Final     Comment:     This test is intended for monitoring Coumadin therapy.  Results are not   accurate in patients with prolonged INR due to factor deficiency.         ASSESSMENT / PLAN  INR assessment THER    Recheck INR In: 4 WEEKS    INR Location Outside lab      Anticoagulation Summary  As of 2019    INR goal:   2.0-3.0   TTR:   63.8 % (2.7 y)   INR used for dosin.9! (2019)   Warfarin maintenance plan:   7.5 mg (5 mg x 1.5) every Mon, Fri; 5 mg (5 mg x 1) all other days   Full warfarin instructions:   7.5 mg every Mon, Fri; 5 mg all other days   Weekly warfarin total:   40 mg   No change documented:   Miladys Bentley RN   Plan last modified:   Livia Cassidy RN (2018)   Next INR check:   2019   Target end date:       Indications    Atrial fibrillation (H) [I48.91]  Long-term (current) use of anticoagulants [Z79.01] [Z79.01]             Anticoagulation Episode Summary     INR check location:       Preferred lab:       Send INR reminders to:   VALER[facility] COLLINS    Comments:   5 mg tablets, dosing card, pt does not like to make an appt during INR check- just let him know when he is due next and he will call and schedule an appt around that time.         Anticoagulation Care Providers     Provider Role Specialty Phone number    Krystle Goodwin RN Responsible              See the Encounter Report to view Anticoagulation Flowsheet and Dosing Calendar (Go to Encounters tab in chart review, and find the Anticoagulation Therapy Visit)    Dosage adjustment made based on physician directed care plan.    Miladys Bentley RN

## 2019-03-01 ENCOUNTER — ANTICOAGULATION THERAPY VISIT (OUTPATIENT)
Dept: ANTICOAGULATION | Facility: CLINIC | Age: 84
End: 2019-03-01
Payer: COMMERCIAL

## 2019-03-01 DIAGNOSIS — I48.20 CHRONIC ATRIAL FIBRILLATION (H): ICD-10-CM

## 2019-03-01 LAB — INR POINT OF CARE: 1.8 (ref 0.9–1.1)

## 2019-03-01 PROCEDURE — 99207 ZZC NO CHARGE NURSE ONLY: CPT

## 2019-03-01 PROCEDURE — 85610 PROTHROMBIN TIME: CPT | Mod: QW

## 2019-03-01 PROCEDURE — 36416 COLLJ CAPILLARY BLOOD SPEC: CPT

## 2019-03-01 NOTE — PROGRESS NOTES
ANTICOAGULATION FOLLOW-UP CLINIC VISIT    Patient Name:  Jamal Francis  Date:  3/1/2019  Contact Type:  Face to Face    SUBJECTIVE:     Patient Findings     Positives:   Change in diet/appetite (more salads lately )    Comments:   Pt declined making an appt today- he is aware hes due 3/29  Livia Cassidy RN             OBJECTIVE    INR Protime   Date Value Ref Range Status   2019 1.8 (A) 0.9 - 1.1 Final       ASSESSMENT / PLAN  INR assessment SUB    Recheck INR In: 2 WEEKS    INR Location Clinic      Anticoagulation Summary  As of 3/1/2019    INR goal:   2.0-3.0   TTR:   60.3 % (2.9 y)   INR used for dosin.8! (3/1/2019)   Warfarin maintenance plan:   7.5 mg (5 mg x 1.5) every Mon, Wed, Fri; 5 mg (5 mg x 1) all other days   Full warfarin instructions:   7.5 mg every Mon, Wed, Fri; 5 mg all other days   Weekly warfarin total:   42.5 mg   Plan last modified:   Livia Cassidy RN (3/1/2019)   Next INR check:   3/29/2019   Target end date:       Indications    Atrial fibrillation (H) [I48.91]  Long-term (current) use of anticoagulants [Z79.01] [Z79.01]             Anticoagulation Episode Summary     INR check location:       Preferred lab:       Send INR reminders to:   VALER[facility] COLLINS    Comments:   5 mg tablets, dosing card, pt does not like to make an appt during INR check- just let him know when he is due next and he will call and schedule an appt around that time.         Anticoagulation Care Providers     Provider Role Specialty Phone number    Krystle Goodwin RN Responsible              See the Encounter Report to view Anticoagulation Flowsheet and Dosing Calendar (Go to Encounters tab in chart review, and find the Anticoagulation Therapy Visit)    Dosage adjustment made based on physician directed care plan.      Livia Cassidy RN

## 2019-05-03 ENCOUNTER — ANTICOAGULATION THERAPY VISIT (OUTPATIENT)
Dept: ANTICOAGULATION | Facility: CLINIC | Age: 84
End: 2019-05-03
Payer: COMMERCIAL

## 2019-05-03 LAB — INR POINT OF CARE: 2.2 (ref 0.86–1.14)

## 2019-05-03 PROCEDURE — 85610 PROTHROMBIN TIME: CPT | Mod: QW

## 2019-05-03 PROCEDURE — 36416 COLLJ CAPILLARY BLOOD SPEC: CPT

## 2019-05-03 PROCEDURE — 99207 ZZC NO CHARGE NURSE ONLY: CPT

## 2019-05-03 NOTE — PROGRESS NOTES
ANTICOAGULATION FOLLOW-UP CLINIC VISIT    Patient Name:  Jamal Francis  Date:  5/3/2019  Contact Type:  Face to Face    SUBJECTIVE:        OBJECTIVE    INR Protime   Date Value Ref Range Status   2019 2.2 (A) 0.86 - 1.14 Final       ASSESSMENT / PLAN  INR assessment THER    Recheck INR In: 6 WEEKS    INR Location Clinic      Anticoagulation Summary  As of 5/3/2019    INR goal:   2.0-3.0   TTR:   59.7 % (3.1 y)   INR used for dosin.2 (5/3/2019)   Warfarin maintenance plan:   7.5 mg (5 mg x 1.5) every Mon, Wed, Fri; 5 mg (5 mg x 1) all other days   Full warfarin instructions:   7.5 mg every Mon, Wed, Fri; 5 mg all other days   Weekly warfarin total:   42.5 mg   No change documented:   Christina Severino, RN   Plan last modified:   Livia Cassidy RN (3/1/2019)   Next INR check:   2019   Target end date:       Indications    Atrial fibrillation (H) [I48.91]  Long-term (current) use of anticoagulants [Z79.01] [Z79.01]             Anticoagulation Episode Summary     INR check location:       Preferred lab:       Send INR reminders to:   [facility]    Comments:   5 mg tablets, dosing card, pt does not like to make an appt during INR check- just let him know when he is due next and he will call and schedule an appt around that time.         Anticoagulation Care Providers     Provider Role Specialty Phone number    Krystle Goodwin RN Responsible              See the Encounter Report to view Anticoagulation Flowsheet and Dosing Calendar (Go to Encounters tab in chart review, and find the Anticoagulation Therapy Visit)    Dosage adjustment made based on physician directed care plan.    Christina Severino, RN

## 2019-05-05 DIAGNOSIS — I48.19 PERSISTENT ATRIAL FIBRILLATION (H): ICD-10-CM

## 2019-05-05 DIAGNOSIS — I48.20 CHRONIC ATRIAL FIBRILLATION (H): ICD-10-CM

## 2019-05-06 RX ORDER — WARFARIN SODIUM 5 MG/1
TABLET ORAL
Qty: 30 TABLET | Refills: 0 | Status: SHIPPED | OUTPATIENT
Start: 2019-05-06 | End: 2019-06-26

## 2019-05-06 RX ORDER — DILTIAZEM HYDROCHLORIDE 30 MG/1
TABLET, FILM COATED ORAL
Qty: 15 TABLET | Refills: 0 | Status: SHIPPED | OUTPATIENT
Start: 2019-05-06 | End: 2019-06-26

## 2019-05-06 NOTE — TELEPHONE ENCOUNTER
"Requested Prescriptions   Pending Prescriptions Disp Refills     diltiazem (CARDIZEM) 30 MG tablet [Pharmacy Med Name: DILTIAZEM 30MG      TAB] 90 tablet 1     Sig: TAKE 1/2 (ONE-HALF) TABLET BY MOUTH TWICE DAILY   Last Written Prescription Date:  11/26/18  Last Fill Quantity: 90,  # refills: 1   Last office visit: 3/13/2018 with prescribing provider:  3/13/18   Future Office Visit:        Calcium Channel Blockers Protocol  Failed - 5/5/2019 12:45 PM        Failed - Normal ALT in past 12 months     Recent Labs   Lab Test 05/19/17  1310   ALT 29             Failed - Recent (12 mo) or future (30 days) visit within the authorizing provider's specialty     Patient had office visit in the last 12 months or has a visit in the next 30 days with authorizing provider or within the authorizing provider's specialty.  See \"Patient Info\" tab in inbasket, or \"Choose Columns\" in Meds & Orders section of the refill encounter.              Failed - Normal serum creatinine on file in past 12 months     Recent Labs   Lab Test 05/20/16  0808   CR 1.01             Passed - Blood pressure under 140/90 in past 12 months     BP Readings from Last 3 Encounters:   11/19/18 131/81   10/23/18 125/68   06/25/18 122/79                 Passed - Medication is active on med list        Passed - Patient is age 18 or older        warfarin (COUMADIN) 5 MG tablet [Pharmacy Med Name: WARFARIN 5MG        TAB] 110 tablet 3     Sig: TAKE 1 TABLET BY MOUTH ONCE DAILY ON  MONDAY  AND  FRIDAY  AND  ONE  AND  ONE-HALF  TABLET  ALL  OTHER  DAYS  OF  THE  WEEK   Last Written Prescription Date:  2/12/18  Last Fill Quantity: 110,  # refills: 3   Last office visit: 3/13/2018 with prescribing provider:  3/13/18   Future Office Visit:        Vitamin K Antagonists Failed - 5/5/2019 12:45 PM        Failed - Recent (12 mo) or future (30 days) visit within the authorizing provider's specialty     Patient had office visit in the last 12 months or has a visit in the next " "30 days with authorizing provider or within the authorizing provider's specialty.  See \"Patient Info\" tab in inbasket, or \"Choose Columns\" in Meds & Orders section of the refill encounter.              Failed - INR is within goal in the past 6 weeks     Confirm INR is within goal in the past 6 weeks.     Recent Labs   Lab Test 05/03/19   INR 2.2*                       Passed - Medication is active on med list        Passed - Patient is 18 years of age or older        "

## 2019-05-06 NOTE — TELEPHONE ENCOUNTER
Routing refill request to provider for review/approval because:  Patient needs to be seen because it has been more than 1 year since last office visit.    MIRIAM ArmstrongN, RN  [facility]

## 2019-06-24 ENCOUNTER — ANTICOAGULATION THERAPY VISIT (OUTPATIENT)
Dept: ANTICOAGULATION | Facility: OTHER | Age: 84
End: 2019-06-24
Payer: COMMERCIAL

## 2019-06-24 ENCOUNTER — OFFICE VISIT (OUTPATIENT)
Dept: FAMILY MEDICINE | Facility: OTHER | Age: 84
End: 2019-06-24
Payer: COMMERCIAL

## 2019-06-24 VITALS
RESPIRATION RATE: 20 BRPM | HEART RATE: 92 BPM | BODY MASS INDEX: 26.59 KG/M2 | WEIGHT: 159.6 LBS | DIASTOLIC BLOOD PRESSURE: 72 MMHG | HEIGHT: 65 IN | SYSTOLIC BLOOD PRESSURE: 114 MMHG | OXYGEN SATURATION: 97 % | TEMPERATURE: 98.4 F

## 2019-06-24 DIAGNOSIS — J43.9 PULMONARY EMPHYSEMA, UNSPECIFIED EMPHYSEMA TYPE (H): ICD-10-CM

## 2019-06-24 DIAGNOSIS — I48.20 CHRONIC ATRIAL FIBRILLATION (H): Primary | ICD-10-CM

## 2019-06-24 DIAGNOSIS — Z79.01 LONG TERM CURRENT USE OF ANTICOAGULANT THERAPY: ICD-10-CM

## 2019-06-24 DIAGNOSIS — Z00.00 ENCOUNTER FOR ROUTINE ADULT HEALTH EXAMINATION WITHOUT ABNORMAL FINDINGS: ICD-10-CM

## 2019-06-24 DIAGNOSIS — Z12.5 SCREENING FOR PROSTATE CANCER: ICD-10-CM

## 2019-06-24 DIAGNOSIS — I48.91 ATRIAL FIBRILLATION (H): ICD-10-CM

## 2019-06-24 DIAGNOSIS — I10 ESSENTIAL HYPERTENSION, BENIGN: ICD-10-CM

## 2019-06-24 LAB
ALBUMIN UR-MCNC: 30 MG/DL
ANION GAP SERPL CALCULATED.3IONS-SCNC: 8 MMOL/L (ref 3–14)
APPEARANCE UR: CLEAR
BILIRUB UR QL STRIP: NEGATIVE
BUN SERPL-MCNC: 26 MG/DL (ref 7–30)
CALCIUM SERPL-MCNC: 8.8 MG/DL (ref 8.5–10.1)
CHLORIDE SERPL-SCNC: 109 MMOL/L (ref 94–109)
CO2 SERPL-SCNC: 26 MMOL/L (ref 20–32)
COLOR UR AUTO: YELLOW
CREAT SERPL-MCNC: 1.13 MG/DL (ref 0.66–1.25)
ERYTHROCYTE [DISTWIDTH] IN BLOOD BY AUTOMATED COUNT: 13.8 % (ref 10–15)
GFR SERPL CREATININE-BSD FRML MDRD: 58 ML/MIN/{1.73_M2}
GLUCOSE SERPL-MCNC: 89 MG/DL (ref 70–99)
GLUCOSE UR STRIP-MCNC: NEGATIVE MG/DL
HCT VFR BLD AUTO: 41.4 % (ref 40–53)
HGB BLD-MCNC: 13.8 G/DL (ref 13.3–17.7)
HGB UR QL STRIP: ABNORMAL
INR POINT OF CARE: 1.6 (ref 0.86–1.14)
KETONES UR STRIP-MCNC: ABNORMAL MG/DL
LEUKOCYTE ESTERASE UR QL STRIP: NEGATIVE
MCH RBC QN AUTO: 29 PG (ref 26.5–33)
MCHC RBC AUTO-ENTMCNC: 33.3 G/DL (ref 31.5–36.5)
MCV RBC AUTO: 87 FL (ref 78–100)
MUCOUS THREADS #/AREA URNS LPF: PRESENT /LPF
NITRATE UR QL: NEGATIVE
PH UR STRIP: 6 PH (ref 5–7)
PLATELET # BLD AUTO: 255 10E9/L (ref 150–450)
POTASSIUM SERPL-SCNC: 4.7 MMOL/L (ref 3.4–5.3)
PSA SERPL-ACNC: 1.28 UG/L (ref 0–4)
RBC # BLD AUTO: 4.76 10E12/L (ref 4.4–5.9)
RBC #/AREA URNS AUTO: ABNORMAL /HPF
SODIUM SERPL-SCNC: 143 MMOL/L (ref 133–144)
SOURCE: ABNORMAL
SP GR UR STRIP: >1.03 (ref 1–1.03)
UROBILINOGEN UR STRIP-ACNC: 0.2 EU/DL (ref 0.2–1)
WBC # BLD AUTO: 7.5 10E9/L (ref 4–11)
WBC #/AREA URNS AUTO: ABNORMAL /HPF

## 2019-06-24 PROCEDURE — 99207 ZZC NO CHARGE NURSE ONLY: CPT

## 2019-06-24 PROCEDURE — G0439 PPPS, SUBSEQ VISIT: HCPCS | Performed by: INTERNAL MEDICINE

## 2019-06-24 PROCEDURE — G0103 PSA SCREENING: HCPCS | Performed by: INTERNAL MEDICINE

## 2019-06-24 PROCEDURE — 85027 COMPLETE CBC AUTOMATED: CPT | Performed by: INTERNAL MEDICINE

## 2019-06-24 PROCEDURE — 81001 URINALYSIS AUTO W/SCOPE: CPT | Performed by: INTERNAL MEDICINE

## 2019-06-24 PROCEDURE — 36416 COLLJ CAPILLARY BLOOD SPEC: CPT

## 2019-06-24 PROCEDURE — 85610 PROTHROMBIN TIME: CPT | Mod: QW

## 2019-06-24 PROCEDURE — 80048 BASIC METABOLIC PNL TOTAL CA: CPT | Performed by: INTERNAL MEDICINE

## 2019-06-24 ASSESSMENT — PAIN SCALES - GENERAL: PAINLEVEL: NO PAIN (0)

## 2019-06-24 ASSESSMENT — ACTIVITIES OF DAILY LIVING (ADL): CURRENT_FUNCTION: NO ASSISTANCE NEEDED

## 2019-06-24 ASSESSMENT — MIFFLIN-ST. JEOR: SCORE: 1325.82

## 2019-06-24 NOTE — PROGRESS NOTES
"SUBJECTIVE:   Jamal Francis is a 87 year old male who presents for Preventive Visit.  click delete button to remove this line now  Are you in the first 12 months of your Medicare coverage?  No    Healthy Habits:    In general, how would you rate your overall health?  Good    Frequency of exercise:  6-7 days/week    Duration of exercise:  Other    Do you usually eat at least 4 servings of fruit and vegetables a day, include whole grains    & fiber and avoid regularly eating high fat or \"junk\" foods?  Yes    Taking medications regularly:  Yes    Barriers to taking medications:  Not applicable    Medication side effects:  Not applicable    Ability to successfully perform activities of daily living:  No assistance needed    Home Safety:  No safety concerns identified    Hearing Impairment:  No hearing concerns    In the past 6 months, have you been bothered by leaking of urine?  No    In general, how would you rate your overall mental or emotional health?  Good      PHQ-2 Total Score:    Additional concerns today:  No    Do you feel safe in your environment? Yes    Do you have a Health Care Directive? Yes: Advance Directive has been received and scanned.      Fall risk  Fallen 2 or more times in the past year?: No  Any fall with injury in the past year?: No  click delete button to remove this line now  Cognitive Screening   1) Repeat 3 items (Leader, Season, Table)    2) Clock draw: ABNORMAL   3) 3 item recall: Recalls NO objects   Results: 0 items recalled: PROBABLE COGNITIVE IMPAIRMENT, **INFORM PROVIDER**    Mini-CogTM Copyright ODETTE Guajardo. Licensed by the author for use in [facility]; reprinted with permission (valerie@.[email or web address]). All rights reserved.      Do you have sleep apnea, excessive snoring or daytime drowsiness?: no    Reviewed and updated as needed this visit by clinical staff  Tobacco  Allergies  Meds  Med Hx  Surg Hx  Fam Hx  Soc Hx        Reviewed and updated as needed this visit by " Provider        Social History     Tobacco Use     Smoking status: Never Smoker     Smokeless tobacco: Never Used   Substance Use Topics     Alcohol use: Yes     Alcohol/week: 0.0 oz     Comment: Moderately     If you drink alcohol do you typically have >3 drinks per day or >7 drinks per week? No    Alcohol Use 5/20/2016   Prescreen: >3 drinks/day or >7 drinks/week? The patient does not drink >3 drinks per day nor >7 drinks per week.   No flowsheet data found.            Current providers sharing in care for this patient include:   Patient Care Team:  Catarino Landrum DO as PCP - General (Internal Medicine)  Catarino Landrum DO as Assigned PCP    The following health maintenance items are reviewed in Epic and correct as of today:  Health Maintenance   Topic Date Due     COPD ACTION PLAN  03/18/1932     ZOSTER IMMUNIZATION (1 of 2) 03/18/1982     PNEUMOCOCCAL IMMUNIZATION 65+ LOW/MEDIUM RISK (2 of 2 - PCV13) 06/21/2012     MEDICARE ANNUAL WELLNESS VISIT  05/20/2017     ADVANCE CARE PLANNING  10/04/2017     FALL RISK ASSESSMENT  08/07/2018     DTAP/TDAP/TD IMMUNIZATION (2 - Td) 10/27/2018     PHQ-2  01/01/2019     INFLUENZA VACCINE (Season Ended) 09/01/2019     OP ANNUAL INR REFERRAL  11/19/2019     LIPID  08/30/2022     SPIROMETRY  Completed     IPV IMMUNIZATION  Aged Out     MENINGITIS IMMUNIZATION  Aged Out     Lab work is in process  BP Readings from Last 3 Encounters:   06/24/19 114/72   11/19/18 131/81   10/23/18 125/68    Wt Readings from Last 3 Encounters:   06/24/19 72.4 kg (159 lb 9.6 oz)   03/13/18 74 kg (163 lb 1.6 oz)   03/10/18 72.6 kg (160 lb)                  Patient Active Problem List   Diagnosis     Atrial fibrillation (H)     CARDIOVASCULAR SCREENING; LDL GOAL LESS THAN 160     Health Care Home     Advanced directives, counseling/discussion     COPD (chronic obstructive pulmonary disease) (H)     Long-term (current) use of anticoagulants [Z79.01]     Essential hypertension,  benign     Past Surgical History:   Procedure Laterality Date     CATARACT IOL, RT/LT  7/27/2006    Right eye     HC REMV CATARACT EXTRACAP,INSERT LENS  08/24/06    lEFT EYE     HC REPAIR ING HERNIA,5+Y/O,REDUCIBL  1993       Social History     Tobacco Use     Smoking status: Never Smoker     Smokeless tobacco: Never Used   Substance Use Topics     Alcohol use: Yes     Alcohol/week: 0.0 oz     Comment: Moderately     Family History   Problem Relation Age of Onset     Cancer Father         Liver     Cancer Paternal Grandfather      Respiratory Brother         Pneumonia         Current Outpatient Medications   Medication Sig Dispense Refill     albuterol (PROAIR HFA, PROVENTIL HFA, VENTOLIN HFA) 108 (90 BASE) MCG/ACT inhaler Inhale 2 puffs into the lungs every 6 hours as needed for shortness of breath / dyspnea or wheezing 1 Inhaler 3     diltiazem (CARDIZEM) 30 MG tablet TAKE 1/2 (ONE-HALF) TABLET BY MOUTH TWICE DAILY 15 tablet 0     ipratropium - albuterol 0.5 mg/2.5 mg/3 mL (DUONEB) 0.5-2.5 (3) MG/3ML neb solution Take 1 vial (3 mLs) by nebulization every 6 hours as needed for shortness of breath / dyspnea or wheezing 90 vial 1     order for DME Nebulizer machine and tubing/cup 1 Device 0     warfarin (COUMADIN) 5 MG tablet TAKE 1 TABLET BY MOUTH ONCE DAILY ON  MONDAY  AND  FRIDAY  AND  ONE  AND  ONE-HALF  TABLET  ALL  OTHER  DAYS  OF  THE  WEEK 30 tablet 0     Allergies   Allergen Reactions     Sulfa Drugs      dizziness         Review of Systems  CONSTITUTIONAL: NEGATIVE for fever, chills, change in weight  INTEGUMENTARY/SKIN: NEGATIVE for worrisome rashes, moles or lesions  EYES: NEGATIVE for vision changes or irritation  ENT/MOUTH: NEGATIVE for ear, mouth and throat problems  RESP: NEGATIVE for significant cough or SOB  CV: NEGATIVE for chest pain, palpitations or peripheral edema  GI: NEGATIVE for nausea, abdominal pain, heartburn, or change in bowel habits  : NEGATIVE for frequency, dysuria, or  "hematuria  MUSCULOSKELETAL: NEGATIVE for significant arthralgias or myalgia  NEURO: NEGATIVE for weakness, dizziness or paresthesias  ENDOCRINE: NEGATIVE for temperature intolerance, skin/hair changes  HEME: NEGATIVE for bleeding problems  PSYCHIATRIC: NEGATIVE for changes in mood or affect    OBJECTIVE:   /72 (BP Location: Right arm, Patient Position: Sitting, Cuff Size: Adult Regular)   Pulse 92   Temp 98.4  F (36.9  C) (Temporal)   Resp 20   Ht 1.651 m (5' 5\")   Wt 72.4 kg (159 lb 9.6 oz)   SpO2 97%   BMI 26.56 kg/m   Estimated body mass index is 26.56 kg/m  as calculated from the following:    Height as of this encounter: 1.651 m (5' 5\").    Weight as of this encounter: 72.4 kg (159 lb 9.6 oz).  Physical Exam  GENERAL: healthy, alert and no distress  EYES: Eyes grossly normal to inspection, PERRL and conjunctivae and sclerae normal  HENT: ear canals and TM's normal, nose and mouth without ulcers or lesions  NECK: no adenopathy, no asymmetry, masses, or scars and thyroid normal to palpation  RESP: lungs clear to auscultation - no rales, rhonchi or wheezes.  Breath sounds are decreased in all fields without stridor  CV: Irregularly irregular rate and rhythm, normal S1 S2, no S3 or S4, no murmur, click or rub, no peripheral edema and peripheral pulses strong  ABDOMEN: soft, nontender, no hepatosplenomegaly, no masses and bowel sounds normal  MS: no gross musculoskeletal defects noted, no edema  SKIN: no suspicious lesions or rashes  NEURO: Normal strength and tone, mentation intact and speech normal  PSYCH: mentation appears normal, affect normal/bright  PSYCH: mentation appears normal, affect normal/bright and patient does have a little difficulty with abstract concepts but is perfectly capable of maintaining a normal activity at home.  His wife notes no problems with confusion etc.    Diagnostic Test Results:  No results found for this or any previous visit (from the past 24 hour(s)).    ASSESSMENT / " "PLAN:       ICD-10-CM    1. Chronic atrial fibrillation (H) I48.2 Basic metabolic panel     *UA reflex to Microscopic and Culture ([place] [facility] (except [place] and [place])   2. Encounter for routine adult health examination without abnormal findings Z00.00    3. Pulmonary emphysema, unspecified emphysema type (H) J43.9 CBC with platelets   4. Essential hypertension, benign I10    5. Screening for prostate cancer Z12.5 PSA, screen       End of Life Planning:  Patient currently has an advanced directive: Yes.  Practitioner is supportive of decision.    COUNSELING:  Reviewed preventive health counseling, as reflected in patient instructions       Regular exercise       Healthy diet/nutrition       Vision screening       Hearing screening    Estimated body mass index is 26.56 kg/m  as calculated from the following:    Height as of this encounter: 1.651 m (5' 5\").    Weight as of this encounter: 72.4 kg (159 lb 9.6 oz).         reports that he has never smoked. He has never used smokeless tobacco.      Appropriate preventive services were discussed with this patient, including applicable screening as appropriate for cardiovascular disease, diabetes, osteopenia/osteoporosis, and glaucoma.  As appropriate for age/gender, discussed screening for colorectal cancer, prostate cancer, breast cancer, and cervical cancer. Checklist reviewing preventive services available has been given to the patient.    Reviewed patients plan of care and provided an AVS. The Basic Care Plan (routine screening as documented in Health Maintenance) for Jamal meets the Care Plan requirement. This Care Plan has been established and reviewed with the Patient and spouse.    Counseling Resources:  ATP IV Guidelines  Pooled Cohorts Equation Calculator  Breast Cancer Risk Calculator  FRAX Risk Assessment  ICSI Preventive Guidelines  Dietary Guidelines for Americans, 2010  USDA's MyPlate  ASA Prophylaxis  Lung CA Screening    Catarino " Howard Landrum, [facility]    Identified Health Risks:

## 2019-06-24 NOTE — PROGRESS NOTES
ANTICOAGULATION FOLLOW-UP CLINIC VISIT    Patient Name:  Jamal Francis  Date:  2019  Contact Type:  Face to Face    SUBJECTIVE:  Patient Findings     Positives:   Change in diet/appetite (Pt's wife states they may have had more greens lately)             OBJECTIVE    INR Protime   Date Value Ref Range Status   2019 1.6 (A) 0.86 - 1.14 Final       ASSESSMENT / PLAN  INR assessment SUB    Recheck INR In: 1 WEEK    INR Location Clinic      Anticoagulation Summary  As of 2019    INR goal:   2.0-3.0   TTR:   58.6 % (3.2 y)   INR used for dosin.6! (2019)   Warfarin maintenance plan:   7.5 mg (5 mg x 1.5) every Mon, Wed, Fri; 5 mg (5 mg x 1) all other days   Full warfarin instructions:   : 10 mg; Otherwise 7.5 mg every Mon, Wed, Fri; 5 mg all other days   Weekly warfarin total:   42.5 mg   Plan last modified:   Livia Cassidy RN (3/1/2019)   Next INR check:   2019   Target end date:       Indications    Atrial fibrillation (H) [I48.91]  Long-term (current) use of anticoagulants [Z79.01] [Z79.01]             Anticoagulation Episode Summary     INR check location:       Preferred lab:       Send INR reminders to:   [facility]    Comments:   5 mg tablets, dosing card, pt does not like to make an appt during INR check- just let him know when he is due next and he will call and schedule an appt around that time.         Anticoagulation Care Providers     Provider Role Specialty Phone number    Krystle Goodwin RN Responsible              See the Encounter Report to view Anticoagulation Flowsheet and Dosing Calendar (Go to Encounters tab in chart review, and find the Anticoagulation Therapy Visit)    Dosage adjustment made based on physician directed care plan.    Marilee Feliciano RN

## 2019-06-26 DIAGNOSIS — I48.20 CHRONIC ATRIAL FIBRILLATION (H): ICD-10-CM

## 2019-06-26 DIAGNOSIS — I48.19 PERSISTENT ATRIAL FIBRILLATION (H): ICD-10-CM

## 2019-06-27 RX ORDER — WARFARIN SODIUM 5 MG/1
TABLET ORAL
Qty: 35 TABLET | Refills: 1 | Status: SHIPPED | OUTPATIENT
Start: 2019-06-27 | End: 2019-08-26

## 2019-06-27 RX ORDER — DILTIAZEM HYDROCHLORIDE 30 MG/1
15 TABLET, FILM COATED ORAL DAILY
Qty: 45 TABLET | Refills: 3 | Status: SHIPPED | OUTPATIENT
Start: 2019-06-27 | End: 2019-08-27

## 2019-06-27 NOTE — TELEPHONE ENCOUNTER
CARDIZEM  Last Written Prescription Date:  5/6/19  Last Fill Quantity: 15 ( 1/2 tab daily) ,  # refills: 0   Last office visit: 6/24/2019 with prescribing provider:     Future Office Visit: NONE  Pharmacy asking for a 3 month supply for better adherence    BP Readings from Last 3 Encounters:   06/24/19 114/72   11/19/18 131/81   10/23/18 125/68   Coumadin  Last Written Prescription Date:  5/6/19  Last Fill Quantity: 30,  # refills: 0   Last office visit: 6/24/2019 with prescribing provider:     Future Office Visit:  NONE  Forwarding COUMADIN  to Primary Coumadin Care Providers as they have been monitoring this patient..................BRIANA Forrester

## 2019-07-01 ENCOUNTER — ANTICOAGULATION THERAPY VISIT (OUTPATIENT)
Dept: ANTICOAGULATION | Facility: CLINIC | Age: 84
End: 2019-07-01
Payer: COMMERCIAL

## 2019-07-01 DIAGNOSIS — Z79.01 LONG TERM CURRENT USE OF ANTICOAGULANT THERAPY: ICD-10-CM

## 2019-07-01 LAB — INR POINT OF CARE: 1.6 (ref 0.86–1.14)

## 2019-07-01 PROCEDURE — 36416 COLLJ CAPILLARY BLOOD SPEC: CPT

## 2019-07-01 PROCEDURE — 99207 ZZC NO CHARGE NURSE ONLY: CPT

## 2019-07-01 PROCEDURE — 85610 PROTHROMBIN TIME: CPT | Mod: QW

## 2019-07-01 NOTE — PROGRESS NOTES
ANTICOAGULATION FOLLOW-UP CLINIC VISIT    Patient Name:  Jamal Francis  Date:  2019  Contact Type:  Face to Face    SUBJECTIVE:  Patient Findings     Positives:   Change in diet/appetite    Comments:   Patient was having mayonnaise in coleslaw and on sandwiches.  They did not know it had vitamin K.          Clinical Outcomes     Comments:   Patient was having mayonnaise in coleslaw and on sandwiches.  They did not know it had vitamin K.             OBJECTIVE    INR Protime   Date Value Ref Range Status   2019 1.6 (A) 0.86 - 1.14 Final       ASSESSMENT / PLAN  INR assessment SUB    Recheck INR In: 1 WEEK    INR Location Clinic      Anticoagulation Summary  As of 2019    INR goal:   2.0-3.0   TTR:   58.2 % (3.2 y)   INR used for dosin.6! (2019)   Warfarin maintenance plan:   7.5 mg (5 mg x 1.5) every Mon, Wed, Fri; 5 mg (5 mg x 1) all other days   Full warfarin instructions:   7: 10 mg; 7: 10 mg; Otherwise 7.5 mg every Mon, Wed, Fri; 5 mg all other days   Weekly warfarin total:   42.5 mg   Plan last modified:   Livia Cassidy RN (3/1/2019)   Next INR check:   2019   Target end date:       Indications    Atrial fibrillation (H) [I48.91]  Long-term (current) use of anticoagulants [Z79.01] [Z79.01]             Anticoagulation Episode Summary     INR check location:       Preferred lab:       Send INR reminders to:   [facility]    Comments:   5 mg tablets, dosing card, pt does not like to make an appt during INR check- just let him know when he is due next and he will call and schedule an appt around that time.         Anticoagulation Care Providers     Provider Role Specialty Phone number    Krystle Goodwin RN Responsible              See the Encounter Report to view Anticoagulation Flowsheet and Dosing Calendar (Go to Encounters tab in chart review, and find the Anticoagulation Therapy Visit)    Dosage adjustment made based on physician directed care plan.    Christina Severino,  RN

## 2019-07-08 ENCOUNTER — ANTICOAGULATION THERAPY VISIT (OUTPATIENT)
Dept: ANTICOAGULATION | Facility: CLINIC | Age: 84
End: 2019-07-08
Payer: COMMERCIAL

## 2019-07-08 DIAGNOSIS — I48.91 ATRIAL FIBRILLATION (H): ICD-10-CM

## 2019-07-08 DIAGNOSIS — Z79.01 LONG TERM CURRENT USE OF ANTICOAGULANT THERAPY: ICD-10-CM

## 2019-07-08 LAB — INR POINT OF CARE: 1.9 (ref 0.9–1.1)

## 2019-07-08 PROCEDURE — 85610 PROTHROMBIN TIME: CPT | Mod: QW

## 2019-07-08 PROCEDURE — 36416 COLLJ CAPILLARY BLOOD SPEC: CPT

## 2019-07-08 PROCEDURE — 99207 ZZC NO CHARGE NURSE ONLY: CPT

## 2019-07-08 NOTE — PROGRESS NOTES
ANTICOAGULATION FOLLOW-UP CLINIC VISIT    Patient Name:  Jamal Francis  Date:  2019  Contact Type:  Face to Face    SUBJECTIVE:  Patient Findings     Positives:   Change in medications (no longer taking Glenna peptase regularily ), Change in diet/appetite (had extra salads/greens this week)             OBJECTIVE    INR Protime   Date Value Ref Range Status   2019 1.9 (A) 0.9 - 1.1 Final       ASSESSMENT / PLAN  INR assessment SUB    Recheck INR In: 1 WEEK    INR Location Clinic      Anticoagulation Summary  As of 2019    INR goal:   2.0-3.0   TTR:   57.9 % (3.2 y)   INR used for dosin.9! (2019)   Warfarin maintenance plan:   5 mg (5 mg x 1) every Wed, Sat; 7.5 mg (5 mg x 1.5) all other days   Full warfarin instructions:   5 mg every Wed, Sat; 7.5 mg all other days   Weekly warfarin total:   47.5 mg   Plan last modified:   Livia Cassidy RN (2019)   Next INR check:   7/15/2019   Target end date:       Indications    Atrial fibrillation (H) [I48.91]  Long-term (current) use of anticoagulants [Z79.01] [Z79.01]             Anticoagulation Episode Summary     INR check location:       Preferred lab:       Send INR reminders to:   [facility]    Comments:   5 mg tablets, dosing card, pt does not like to make an appt during INR check- just let him know when he is due next and he will call and schedule an appt around that time.         Anticoagulation Care Providers     Provider Role Specialty Phone number    Krystle Goodwin RN Responsible              See the Encounter Report to view Anticoagulation Flowsheet and Dosing Calendar (Go to Encounters tab in chart review, and find the Anticoagulation Therapy Visit)    Dosage adjustment made based on physician directed care plan.    Livia Cassidy RN

## 2019-07-15 ENCOUNTER — ANTICOAGULATION THERAPY VISIT (OUTPATIENT)
Dept: ANTICOAGULATION | Facility: CLINIC | Age: 84
End: 2019-07-15
Payer: COMMERCIAL

## 2019-07-15 VITALS — HEART RATE: 92 BPM | SYSTOLIC BLOOD PRESSURE: 121 MMHG | DIASTOLIC BLOOD PRESSURE: 77 MMHG

## 2019-07-15 DIAGNOSIS — I48.91 ATRIAL FIBRILLATION (H): ICD-10-CM

## 2019-07-15 DIAGNOSIS — Z79.01 LONG TERM CURRENT USE OF ANTICOAGULANT THERAPY: ICD-10-CM

## 2019-07-15 LAB — INR POINT OF CARE: 2.6 (ref 0.9–?)

## 2019-07-15 PROCEDURE — 99207 ZZC NO CHARGE NURSE ONLY: CPT

## 2019-07-15 PROCEDURE — 36416 COLLJ CAPILLARY BLOOD SPEC: CPT

## 2019-07-15 PROCEDURE — 85610 PROTHROMBIN TIME: CPT | Mod: QW

## 2019-07-15 NOTE — PROGRESS NOTES
ANTICOAGULATION FOLLOW-UP CLINIC VISIT    Patient Name:  Jamal Francis  Date:  7/15/2019  Contact Type:  Face to Face    SUBJECTIVE:  Patient Findings     Comments:   The patient was assessed for diet, medication, and activity level changes, missed or extra doses, bruising or bleeding, with no problem findings.  Livia Cassidy RN          Clinical Outcomes     Negatives:   Major bleeding event, Thromboembolic event, Anticoagulation-related hospital admission, Anticoagulation-related ED visit, Anticoagulation-related fatality    Comments:   The patient was assessed for diet, medication, and activity level changes, missed or extra doses, bruising or bleeding, with no problem findings.  Livia Cassidy RN             OBJECTIVE    INR Protime   Date Value Ref Range Status   07/15/2019 2.6 (A) 0.9 - 1.1m Final       ASSESSMENT / PLAN  INR assessment THER    Recheck INR In: 3 WEEKS    INR Location Clinic      Anticoagulation Summary  As of 7/15/2019    INR goal:   2.0-3.0   TTR:   58.0 % (3.3 y)   INR used for dosin.6 (7/15/2019)   Warfarin maintenance plan:   5 mg (5 mg x 1) every Wed, Sat; 7.5 mg (5 mg x 1.5) all other days   Full warfarin instructions:   5 mg every Wed, Sat; 7.5 mg all other days   Weekly warfarin total:   47.5 mg   No change documented:   Livia Cassidy RN   Plan last modified:   Livia Cassidy RN (2019)   Next INR check:   2019   Target end date:       Indications    Atrial fibrillation (H) [I48.91]  Long-term (current) use of anticoagulants [Z79.01] [Z79.01]             Anticoagulation Episode Summary     INR check location:       Preferred lab:       Send INR reminders to:   [facility]    Comments:   5 mg tablets, dosing card, pt does not like to make an appt during INR check- just let him know when he is due next and he will call and schedule an appt around that time.         Anticoagulation Care Providers     Provider Role Specialty Phone number    Buddy  Krystle GARCÍA RN Responsible              See the Encounter Report to view Anticoagulation Flowsheet and Dosing Calendar (Go to Encounters tab in chart review, and find the Anticoagulation Therapy Visit)    Dosage adjustment made based on physician directed care plan.      Livia Cassidy RN

## 2019-08-05 ENCOUNTER — ANTICOAGULATION THERAPY VISIT (OUTPATIENT)
Dept: ANTICOAGULATION | Facility: CLINIC | Age: 84
End: 2019-08-05
Payer: COMMERCIAL

## 2019-08-05 DIAGNOSIS — Z79.01 LONG TERM CURRENT USE OF ANTICOAGULANT THERAPY: ICD-10-CM

## 2019-08-05 DIAGNOSIS — I48.91 ATRIAL FIBRILLATION (H): ICD-10-CM

## 2019-08-05 LAB — INR POINT OF CARE: 3.4 (ref 0.9–1.1)

## 2019-08-05 PROCEDURE — 36416 COLLJ CAPILLARY BLOOD SPEC: CPT

## 2019-08-05 PROCEDURE — 99207 ZZC NO CHARGE NURSE ONLY: CPT

## 2019-08-05 PROCEDURE — 85610 PROTHROMBIN TIME: CPT | Mod: QW

## 2019-08-05 NOTE — PROGRESS NOTES
ANTICOAGULATION FOLLOW-UP CLINIC VISIT    Patient Name:  Jamal Francis  Date:  8/5/2019  Contact Type:  Face to Face    SUBJECTIVE:  Patient Findings     Positives:   Change in medications (has been irasema peptase lately. This may be why INR is elevated.  He said that he will stop taking it. )             OBJECTIVE    INR Protime   Date Value Ref Range Status   08/05/2019 3.4 (A) 0.9 - 1.1 Final       ASSESSMENT / PLAN  INR assessment SUPRA    Recheck INR In: 2 WEEKS    INR Location Clinic      Anticoagulation Summary  As of 8/5/2019    INR goal:   2.0-3.0   TTR:   57.9 % (3.3 y)   INR used for dosing:   3.4! (8/5/2019)   Warfarin maintenance plan:   5 mg (5 mg x 1) every Wed, Sat; 7.5 mg (5 mg x 1.5) all other days   Full warfarin instructions:   8/5: 5 mg; Otherwise 5 mg every Wed, Sat; 7.5 mg all other days   Weekly warfarin total:   47.5 mg   Plan last modified:   Livia Cassidy RN (7/8/2019)   Next INR check:   8/19/2019   Target end date:       Indications    Atrial fibrillation (H) [I48.91]  Long-term (current) use of anticoagulants [Z79.01] [Z79.01]             Anticoagulation Episode Summary     INR check location:       Preferred lab:       Send INR reminders to:   [facility]    Comments:   5 mg tablets, dosing card, pt does not like to make an appt during INR check- just let him know when he is due next and he will call and schedule an appt around that time.         Anticoagulation Care Providers     Provider Role Specialty Phone number    Krystle Goodwin RN Responsible              See the Encounter Report to view Anticoagulation Flowsheet and Dosing Calendar (Go to Encounters tab in chart review, and find the Anticoagulation Therapy Visit)    Dosage adjustment made based on physician directed care plan.      Livia Cassidy RN

## 2019-08-19 ENCOUNTER — ANTICOAGULATION THERAPY VISIT (OUTPATIENT)
Dept: ANTICOAGULATION | Facility: CLINIC | Age: 84
End: 2019-08-19
Payer: COMMERCIAL

## 2019-08-19 VITALS — HEART RATE: 89 BPM | SYSTOLIC BLOOD PRESSURE: 126 MMHG | DIASTOLIC BLOOD PRESSURE: 82 MMHG

## 2019-08-19 DIAGNOSIS — Z79.01 LONG TERM CURRENT USE OF ANTICOAGULANT THERAPY: ICD-10-CM

## 2019-08-19 DIAGNOSIS — I48.91 ATRIAL FIBRILLATION, UNSPECIFIED TYPE (H): ICD-10-CM

## 2019-08-19 LAB — INR POINT OF CARE: 2.8 (ref 0.9–1.1)

## 2019-08-19 PROCEDURE — 85610 PROTHROMBIN TIME: CPT | Mod: QW

## 2019-08-19 PROCEDURE — 99207 ZZC NO CHARGE NURSE ONLY: CPT

## 2019-08-19 PROCEDURE — 36416 COLLJ CAPILLARY BLOOD SPEC: CPT

## 2019-08-19 NOTE — PROGRESS NOTES
ANTICOAGULATION FOLLOW-UP CLINIC VISIT    Patient Name:  Jamal Francis  Date:  2019  Contact Type:  Face to Face    SUBJECTIVE:  Patient Findings     Positives:   Missed doses (missed Friday's ), Extra doses (pt took 7.5 mg on Sat instead of 5 mg), Change in medications (no longer taking Glenna Peptase )             OBJECTIVE    INR Protime   Date Value Ref Range Status   2019 2.8 (A) 0.9 - 1.1 Final       ASSESSMENT / PLAN  INR assessment THER    Recheck INR In: 3 WEEKS    INR Location Clinic      Anticoagulation Summary  As of 2019    INR goal:   2.0-3.0   TTR:   57.6 % (3.4 y)   INR used for dosin.8 (2019)   Warfarin maintenance plan:   5 mg (5 mg x 1) every Wed, Sat; 7.5 mg (5 mg x 1.5) all other days   Full warfarin instructions:   5 mg every Wed, Sat; 7.5 mg all other days   Weekly warfarin total:   47.5 mg   No change documented:   Livia Cassidy RN   Plan last modified:   Livia Cassidy RN (2019)   Next INR check:   2019   Target end date:       Indications    Atrial fibrillation (H) [I48.91]  Long-term (current) use of anticoagulants [Z79.01] [Z79.01]             Anticoagulation Episode Summary     INR check location:       Preferred lab:       Send INR reminders to:   [facility]    Comments:   5 mg tablets, dosing card, pt does not like to make an appt during INR check- just let him know when he is due next and he will call and schedule an appt around that time.         Anticoagulation Care Providers     Provider Role Specialty Phone number    Krystle Goodwin RN Responsible              See the Encounter Report to view Anticoagulation Flowsheet and Dosing Calendar (Go to Encounters tab in chart review, and find the Anticoagulation Therapy Visit)    Dosage adjustment made based on physician directed care plan.      Livia Cassidy RN

## 2019-08-26 DIAGNOSIS — I48.19 PERSISTENT ATRIAL FIBRILLATION (H): ICD-10-CM

## 2019-08-26 RX ORDER — WARFARIN SODIUM 5 MG/1
TABLET ORAL
Qty: 40 TABLET | Refills: 1 | Status: SHIPPED | OUTPATIENT
Start: 2019-08-26 | End: 2019-09-23

## 2019-08-27 ENCOUNTER — TELEPHONE (OUTPATIENT)
Dept: FAMILY MEDICINE | Facility: OTHER | Age: 84
End: 2019-08-27

## 2019-08-27 DIAGNOSIS — I48.20 CHRONIC ATRIAL FIBRILLATION (H): ICD-10-CM

## 2019-08-27 RX ORDER — DILTIAZEM HYDROCHLORIDE 30 MG/1
15 TABLET, FILM COATED ORAL DAILY
Qty: 45 TABLET | Refills: 3 | Status: SHIPPED | OUTPATIENT
Start: 2019-08-27 | End: 2020-10-09

## 2019-08-27 RX ORDER — DILTIAZEM HYDROCHLORIDE 30 MG/1
15 TABLET, FILM COATED ORAL DAILY
Qty: 45 TABLET | Refills: 3 | Status: CANCELLED | OUTPATIENT
Start: 2019-08-27

## 2019-08-27 NOTE — TELEPHONE ENCOUNTER
Reason for Call:  Medication or medication refill:    Do you use a [facility]?  Name of the pharmacy and phone number for the current request:  [facility] - [phone]    Name of the medication requested: diltiazem (CARDIZEM) 30 MG tablet    Other request: [facility] pharmacy calling to verify dosing and directions of this medication. Please advise.     Can we leave a detailed message on this number? Not Applicable    Phone number patient can be reached at:     Best Time:     Call taken on 8/27/2019 at 2:55 PM by Yulissa Domingo

## 2019-09-09 ENCOUNTER — ANTICOAGULATION THERAPY VISIT (OUTPATIENT)
Dept: ANTICOAGULATION | Facility: CLINIC | Age: 84
End: 2019-09-09
Payer: COMMERCIAL

## 2019-09-09 VITALS — DIASTOLIC BLOOD PRESSURE: 83 MMHG | HEART RATE: 76 BPM | SYSTOLIC BLOOD PRESSURE: 142 MMHG

## 2019-09-09 DIAGNOSIS — I48.91 ATRIAL FIBRILLATION, UNSPECIFIED TYPE (H): ICD-10-CM

## 2019-09-09 DIAGNOSIS — Z79.01 LONG TERM CURRENT USE OF ANTICOAGULANT THERAPY: ICD-10-CM

## 2019-09-09 LAB — INR POINT OF CARE: 3.8 (ref 0.9–1.1)

## 2019-09-09 PROCEDURE — 36416 COLLJ CAPILLARY BLOOD SPEC: CPT

## 2019-09-09 PROCEDURE — 85610 PROTHROMBIN TIME: CPT | Mod: QW

## 2019-09-09 PROCEDURE — 99207 ZZC NO CHARGE NURSE ONLY: CPT

## 2019-09-09 NOTE — PROGRESS NOTES
ANTICOAGULATION FOLLOW-UP CLINIC VISIT    Patient Name:  Jamal Francis  Date:  9/9/2019  Contact Type:  Face to Face    SUBJECTIVE:  Patient Findings     Positives:   Change in medications (pt will start taking serrapeptase for an ongoing cough. I encouraged him and his wife to take this consistently as it can increase the INR . )             OBJECTIVE    INR Protime   Date Value Ref Range Status   09/09/2019 3.8 (A) 0.9 - 1.1 Final       ASSESSMENT / PLAN  INR assessment SUPRA    Recheck INR In: 4 DAYS    INR Location Clinic      Anticoagulation Summary  As of 9/9/2019    INR goal:   2.0-3.0   TTR:   57.0 % (3.4 y)   INR used for dosing:   3.8! (9/9/2019)   Warfarin maintenance plan:   5 mg (5 mg x 1) every Wed, Sat; 7.5 mg (5 mg x 1.5) all other days   Full warfarin instructions:   9/9: 2.5 mg; 9/10: 5 mg; Otherwise 5 mg every Wed, Sat; 7.5 mg all other days   Weekly warfarin total:   47.5 mg   Plan last modified:   Livia Cassidy RN (7/8/2019)   Next INR check:   9/13/2019   Target end date:       Indications    Atrial fibrillation (H) [I48.91]  Long-term (current) use of anticoagulants [Z79.01] [Z79.01]             Anticoagulation Episode Summary     INR check location:       Preferred lab:       Send INR reminders to:   [facility]    Comments:   5 mg tablets, dosing card, pt does not like to make an appt during INR check- just let him know when he is due next and he will call and schedule an appt around that time.         Anticoagulation Care Providers     Provider Role Specialty Phone number    Krystle Goodwin RN Responsible              See the Encounter Report to view Anticoagulation Flowsheet and Dosing Calendar (Go to Encounters tab in chart review, and find the Anticoagulation Therapy Visit)    Dosage adjustment made based on physician directed care plan.      Livia Cassidy RN

## 2019-09-13 ENCOUNTER — ANTICOAGULATION THERAPY VISIT (OUTPATIENT)
Dept: ANTICOAGULATION | Facility: CLINIC | Age: 84
End: 2019-09-13
Payer: COMMERCIAL

## 2019-09-13 VITALS — SYSTOLIC BLOOD PRESSURE: 133 MMHG | DIASTOLIC BLOOD PRESSURE: 87 MMHG | HEART RATE: 82 BPM

## 2019-09-13 DIAGNOSIS — Z79.01 LONG TERM CURRENT USE OF ANTICOAGULANT THERAPY: ICD-10-CM

## 2019-09-13 DIAGNOSIS — I48.91 ATRIAL FIBRILLATION, UNSPECIFIED TYPE (H): ICD-10-CM

## 2019-09-13 LAB — INR POINT OF CARE: 2.4 (ref 0.9–1.1)

## 2019-09-13 PROCEDURE — 99207 ZZC NO CHARGE NURSE ONLY: CPT

## 2019-09-13 PROCEDURE — 85610 PROTHROMBIN TIME: CPT | Mod: QW

## 2019-09-13 PROCEDURE — 36416 COLLJ CAPILLARY BLOOD SPEC: CPT

## 2019-09-13 NOTE — PROGRESS NOTES
ANTICOAGULATION FOLLOW-UP CLINIC VISIT    Patient Name:  Jamal Francis  Date:  2019  Contact Type:  Face to Face    SUBJECTIVE:  Patient Findings     Positives:   Change in medications (has not been taking serrapeptase (can increase INR), but think he will try it once a week to see if it helps with his cough )             OBJECTIVE    INR Protime   Date Value Ref Range Status   2019 2.4 (A) 0.9 - 1.1 Final       ASSESSMENT / PLAN  INR assessment THER    Recheck INR In: 1 WEEK    INR Location Clinic      Anticoagulation Summary  As of 2019    INR goal:   2.0-3.0   TTR:   56.9 % (3.4 y)   INR used for dosin.4 (2019)   Warfarin maintenance plan:   7.5 mg (5 mg x 1.5) every Mon, Fri; 5 mg (5 mg x 1) all other days   Full warfarin instructions:   7.5 mg every Mon, Fri; 5 mg all other days   Weekly warfarin total:   40 mg   Plan last modified:   Livia Cassidy RN (2019)   Next INR check:   2019   Target end date:       Indications    Atrial fibrillation (H) [I48.91]  Long-term (current) use of anticoagulants [Z79.01] [Z79.01]             Anticoagulation Episode Summary     INR check location:       Preferred lab:       Send INR reminders to:   [facility]    Comments:   5 mg tablets, dosing card, pt does not like to make an appt during INR check- just let him know when he is due next and he will call and schedule an appt around that time.         Anticoagulation Care Providers     Provider Role Specialty Phone number    Krystle Goodwin RN Responsible              See the Encounter Report to view Anticoagulation Flowsheet and Dosing Calendar (Go to Encounters tab in chart review, and find the Anticoagulation Therapy Visit)    Dosage adjustment made based on physician directed care plan.      Livia Cassidy RN

## 2019-09-23 ENCOUNTER — ANTICOAGULATION THERAPY VISIT (OUTPATIENT)
Dept: ANTICOAGULATION | Facility: CLINIC | Age: 84
End: 2019-09-23
Payer: COMMERCIAL

## 2019-09-23 DIAGNOSIS — I48.91 ATRIAL FIBRILLATION, UNSPECIFIED TYPE (H): ICD-10-CM

## 2019-09-23 DIAGNOSIS — I48.19 PERSISTENT ATRIAL FIBRILLATION (H): ICD-10-CM

## 2019-09-23 DIAGNOSIS — Z79.01 LONG TERM CURRENT USE OF ANTICOAGULANT THERAPY: ICD-10-CM

## 2019-09-23 LAB — INR POINT OF CARE: 1.9 (ref 0.9–1.1)

## 2019-09-23 PROCEDURE — 85610 PROTHROMBIN TIME: CPT | Mod: QW

## 2019-09-23 PROCEDURE — 99207 ZZC NO CHARGE NURSE ONLY: CPT

## 2019-09-23 PROCEDURE — 36416 COLLJ CAPILLARY BLOOD SPEC: CPT

## 2019-09-23 RX ORDER — WARFARIN SODIUM 5 MG/1
TABLET ORAL
Qty: 40 TABLET | Refills: 1 | COMMUNITY
Start: 2019-09-23 | End: 2021-04-02

## 2019-09-23 NOTE — PROGRESS NOTES
ANTICOAGULATION FOLLOW-UP CLINIC VISIT    Patient Name:  Jamal Francis  Date:  2019  Contact Type:  Face to Face    SUBJECTIVE:  Patient Findings     Comments:   The patient was assessed for diet, medication, and activity level changes, missed or extra doses, bruising or bleeding, with no problem findings.  Livia Cassidy RN          Clinical Outcomes     Negatives:   Major bleeding event, Thromboembolic event, Anticoagulation-related hospital admission, Anticoagulation-related ED visit, Anticoagulation-related fatality    Comments:   The patient was assessed for diet, medication, and activity level changes, missed or extra doses, bruising or bleeding, with no problem findings.  Livia Cassidy RN             OBJECTIVE    INR Protime   Date Value Ref Range Status   2019 1.9 (A) 0.9 - 1.1 Final       ASSESSMENT / PLAN  INR assessment THER    Recheck INR In: 3 WEEKS    INR Location Clinic      Anticoagulation Summary  As of 2019    INR goal:   2.0-3.0   TTR:   57.1 % (3.5 y)   INR used for dosin.9! (2019)   Warfarin maintenance plan:   7.5 mg (5 mg x 1.5) every Mon, Fri; 5 mg (5 mg x 1) all other days   Full warfarin instructions:   7.5 mg every Mon, Fri; 5 mg all other days   Weekly warfarin total:   40 mg   Plan last modified:   Livia Cassidy RN (2019)   Next INR check:   10/14/2019   Target end date:       Indications    Atrial fibrillation (H) [I48.91]  Long-term (current) use of anticoagulants [Z79.01] [Z79.01]             Anticoagulation Episode Summary     INR check location:       Preferred lab:       Send INR reminders to:   [facility]    Comments:   5 mg tablets, dosing card, pt does not like to make an appt during INR check- just let him know when he is due next and he will call and schedule an appt around that time.         Anticoagulation Care Providers     Provider Role Specialty Phone number    Krystle Goodwin RN Responsible              See the  Encounter Report to view Anticoagulation Flowsheet and Dosing Calendar (Go to Encounters tab in chart review, and find the Anticoagulation Therapy Visit)    Dosage adjustment made based on physician directed care plan.      Livia Cassidy RN

## 2019-10-14 ENCOUNTER — ANTICOAGULATION THERAPY VISIT (OUTPATIENT)
Dept: ANTICOAGULATION | Facility: CLINIC | Age: 84
End: 2019-10-14
Payer: COMMERCIAL

## 2019-10-14 VITALS — HEART RATE: 80 BPM | DIASTOLIC BLOOD PRESSURE: 86 MMHG | SYSTOLIC BLOOD PRESSURE: 133 MMHG

## 2019-10-14 DIAGNOSIS — I48.91 ATRIAL FIBRILLATION, UNSPECIFIED TYPE (H): ICD-10-CM

## 2019-10-14 DIAGNOSIS — Z79.01 LONG TERM CURRENT USE OF ANTICOAGULANT THERAPY: ICD-10-CM

## 2019-10-14 LAB — INR POINT OF CARE: 3.2 (ref 0.9–1.1)

## 2019-10-14 PROCEDURE — 36416 COLLJ CAPILLARY BLOOD SPEC: CPT

## 2019-10-14 PROCEDURE — 99207 ZZC NO CHARGE NURSE ONLY: CPT

## 2019-10-14 PROCEDURE — 85610 PROTHROMBIN TIME: CPT | Mod: QW

## 2019-10-14 NOTE — PROGRESS NOTES
ANTICOAGULATION FOLLOW-UP CLINIC VISIT    Patient Name:  Jamal Francis  Date:  10/14/2019  Contact Type:  Face to Face    SUBJECTIVE:  Patient Findings     Positives:   Change in diet/appetite (less greens lately, will increase slightly now )             OBJECTIVE    INR Protime   Date Value Ref Range Status   10/14/2019 3.2 (A) 0.9 - 1.1 Final       ASSESSMENT / PLAN  INR assessment SUPRA    Recheck INR In: 4 WEEKS    INR Location Clinic      Anticoagulation Summary  As of 10/14/2019    INR goal:   2.0-3.0   TTR:   57.4 % (3.5 y)   INR used for dosing:   3.2! (10/14/2019)   Warfarin maintenance plan:   7.5 mg (5 mg x 1.5) every Mon, Fri; 5 mg (5 mg x 1) all other days   Full warfarin instructions:   10/14: 5 mg; Otherwise 7.5 mg every Mon, Fri; 5 mg all other days   Weekly warfarin total:   40 mg   Plan last modified:   Livia Cassidy RN (9/13/2019)   Next INR check:   11/11/2019   Target end date:       Indications    Atrial fibrillation (H) [I48.91]  Long-term (current) use of anticoagulants [Z79.01] [Z79.01]             Anticoagulation Episode Summary     INR check location:       Preferred lab:       Send INR reminders to:   [facility]    Comments:   5 mg tablets, dosing card, pt does not like to make an appt during INR check- just let him know when he is due next and he will call and schedule an appt around that time.         Anticoagulation Care Providers     Provider Role Specialty Phone number    Krystle Goodwin RN Responsible              See the Encounter Report to view Anticoagulation Flowsheet and Dosing Calendar (Go to Encounters tab in chart review, and find the Anticoagulation Therapy Visit)    Dosage adjustment made based on physician directed care plan.      Livia Cassidy, BRIANA

## 2019-11-10 DIAGNOSIS — I48.19 PERSISTENT ATRIAL FIBRILLATION (H): ICD-10-CM

## 2019-11-11 RX ORDER — WARFARIN SODIUM 5 MG/1
TABLET ORAL
Qty: 120 TABLET | Refills: 0 | Status: SHIPPED | OUTPATIENT
Start: 2019-11-11 | End: 2020-01-06

## 2019-11-13 ENCOUNTER — ANTICOAGULATION THERAPY VISIT (OUTPATIENT)
Dept: ANTICOAGULATION | Facility: CLINIC | Age: 84
End: 2019-11-13
Payer: COMMERCIAL

## 2019-11-13 VITALS — DIASTOLIC BLOOD PRESSURE: 79 MMHG | SYSTOLIC BLOOD PRESSURE: 126 MMHG | HEART RATE: 87 BPM

## 2019-11-13 DIAGNOSIS — I48.91 ATRIAL FIBRILLATION, UNSPECIFIED TYPE (H): ICD-10-CM

## 2019-11-13 DIAGNOSIS — Z79.01 LONG TERM CURRENT USE OF ANTICOAGULANT THERAPY: ICD-10-CM

## 2019-11-13 LAB — INR POINT OF CARE: 4.3 (ref 0.9–1.1)

## 2019-11-13 PROCEDURE — 85610 PROTHROMBIN TIME: CPT | Mod: QW

## 2019-11-13 PROCEDURE — 36416 COLLJ CAPILLARY BLOOD SPEC: CPT

## 2019-11-13 PROCEDURE — 99207 ZZC NO CHARGE NURSE ONLY: CPT

## 2019-11-13 NOTE — PROGRESS NOTES
ANTICOAGULATION FOLLOW-UP CLINIC VISIT    Patient Name:  Jamal Francis  Date:  2019  Contact Type:  Face to Face    SUBJECTIVE:  Patient Findings     Positives:   Change in diet/appetite    Comments:   Change in medications - less greens x2 weeks and has not been eating celary juice. He plans to go back to his normal amount of greens now.                OBJECTIVE    INR Protime   Date Value Ref Range Status   2019 4.3 (A) 0.9 - 1.1 Final       ASSESSMENT / PLAN  INR assessment SUPRA    Recheck INR In: 10 DAYS    INR Location Clinic      Anticoagulation Summary  As of 2019    INR goal:   2.0-3.0   TTR:   56.1 % (3.6 y)   INR used for dosin.3! (2019)   Warfarin maintenance plan:   7.5 mg (5 mg x 1.5) every Mon; 5 mg (5 mg x 1) all other days   Full warfarin instructions:   : 2.5 mg; Otherwise 7.5 mg every Mon; 5 mg all other days   Weekly warfarin total:   37.5 mg   Plan last modified:   Livia Cassidy RN (2019)   Next INR check:   2019   Target end date:       Indications    Atrial fibrillation (H) [I48.91]  Long-term (current) use of anticoagulants [Z79.01] [Z79.01]             Anticoagulation Episode Summary     INR check location:       Preferred lab:       Send INR reminders to:   [facility]    Comments:   5 mg tablets, dosing card, pt does not like to make an appt during INR check- just let him know when he is due next and he will call and schedule an appt around that time.         Anticoagulation Care Providers     Provider Role Specialty Phone number    Krystle Goodwin RN Responsible              See the Encounter Report to view Anticoagulation Flowsheet and Dosing Calendar (Go to Encounters tab in chart review, and find the Anticoagulation Therapy Visit)    Dosage adjustment made based on physician directed care plan.      Livia Cassidy RN

## 2019-11-22 ENCOUNTER — ANTICOAGULATION THERAPY VISIT (OUTPATIENT)
Dept: ANTICOAGULATION | Facility: CLINIC | Age: 84
End: 2019-11-22
Payer: COMMERCIAL

## 2019-11-22 VITALS — SYSTOLIC BLOOD PRESSURE: 139 MMHG | HEART RATE: 89 BPM | DIASTOLIC BLOOD PRESSURE: 87 MMHG

## 2019-11-22 DIAGNOSIS — Z79.01 LONG TERM CURRENT USE OF ANTICOAGULANT THERAPY: ICD-10-CM

## 2019-11-22 DIAGNOSIS — I48.91 ATRIAL FIBRILLATION, UNSPECIFIED TYPE (H): ICD-10-CM

## 2019-11-22 LAB — INR POINT OF CARE: 3.2 (ref 0.9–1.1)

## 2019-11-22 PROCEDURE — 99207 ZZC NO CHARGE NURSE ONLY: CPT

## 2019-11-22 PROCEDURE — 36416 COLLJ CAPILLARY BLOOD SPEC: CPT

## 2019-11-22 PROCEDURE — 85610 PROTHROMBIN TIME: CPT | Mod: QW

## 2019-11-22 NOTE — PROGRESS NOTES
ANTICOAGULATION FOLLOW-UP CLINIC VISIT    Patient Name:  Jamal Francis  Date:  11/22/2019  Contact Type:  Face to Face    SUBJECTIVE:  Patient Findings     Comments:   The patient was assessed for diet, medication, and activity level changes, missed or extra doses, bruising or bleeding, with no problem findings.  Livia Cassidy RN          Clinical Outcomes     Negatives:   Major bleeding event, Thromboembolic event, Anticoagulation-related hospital admission, Anticoagulation-related ED visit, Anticoagulation-related fatality    Comments:   The patient was assessed for diet, medication, and activity level changes, missed or extra doses, bruising or bleeding, with no problem findings.  Livia Cassidy RN             OBJECTIVE    INR Protime   Date Value Ref Range Status   11/22/2019 3.2 (A) 0.9 - 1.1 Final       ASSESSMENT / PLAN  INR assessment THER    Recheck INR In: 2 WEEKS    INR Location Clinic      Anticoagulation Summary  As of 11/22/2019    INR goal:   2.0-3.0   TTR:   32.7 % (1 y)   INR used for dosing:   3.2! (11/22/2019)   Warfarin maintenance plan:   5 mg (5 mg x 1) every day   Full warfarin instructions:   5 mg every day   Weekly warfarin total:   35 mg   Plan last modified:   Livia Cassidy RN (11/22/2019)   Next INR check:   12/6/2019   Priority:   Maintenance   Target end date:       Indications    Atrial fibrillation (H) [I48.91]  Long-term (current) use of anticoagulants [Z79.01] [Z79.01]             Anticoagulation Episode Summary     INR check location:       Preferred lab:       Send INR reminders to:   [facility]    Comments:   5 mg tablets, dosing card, pt does not like to make an appt during INR check- just let him know when he is due next and he will call and schedule an appt around that time.         Anticoagulation Care Providers     Provider Role Specialty Phone number    Krystle Goodwin RN Responsible              See the Encounter Report to view Anticoagulation  Flowsheet and Dosing Calendar (Go to Encounters tab in chart review, and find the Anticoagulation Therapy Visit)    Dosage adjustment made based on physician directed care plan.      Livia Cassidy RN

## 2019-12-17 NOTE — MR AVS SNAPSHOT
Jamal SNIDER Penny   10/9/2017 10:30 AM   Anticoagulation Therapy Visit    Description:  85 year old male   Provider:  [facility]   Department:  [facility]           INR as of 10/9/2017     Today's INR 2.2      Anticoagulation Summary as of 10/9/2017     INR goal 2.0-3.0   Today's INR 2.2   Full instructions 7.5 mg on Fri; 5 mg all other days   Next INR check 11/6/2017    Indications   Atrial fibrillation (H) [I48.91]  Long-term (current) use of anticoagulants [Z79.01] [Z79.01]         Contact Numbers     Clinic Number:         October 2017 Details    Sun Mon Tue Wed Thu Fri Sat     1               2               3               4               5               6               7                 8               9      5 mg   See details      10      5 mg         11      5 mg         12      5 mg         13      7.5 mg         14      5 mg           15      5 mg         16      5 mg         17      5 mg         18      5 mg         19      5 mg         20      7.5 mg         21      5 mg           22      5 mg         23      5 mg         24      5 mg         25      5 mg         26      5 mg         27      7.5 mg         28      5 mg           29      5 mg         30      5 mg         31      5 mg              Date Details   10/09 This INR check               How to take your warfarin dose     To take:  5 mg Take 1 of the 5 mg tablets.    To take:  7.5 mg Take 1.5 of the 5 mg tablets.           November 2017 Details    Sun Mon Tue Wed Thu Fri Sat        1      5 mg         2      5 mg         3      7.5 mg         4      5 mg           5      5 mg         6            7               8               9               10               11                 12               13               14               15               16               17               18                 19               20               21               22               23               24               25                 26               27                28               29               30                  Date Details   No additional details    Date of next INR:  11/6/2017         How to take your warfarin dose     To take:  5 mg Take 1 of the 5 mg tablets.    To take:  7.5 mg Take 1.5 of the 5 mg tablets.            Arteriosclerosis    CAD (coronary artery disease)    Diabetes 1.5, managed as type 2    DJD (degenerative joint disease)    GERD (gastroesophageal reflux disease)    Heartburn    Hepatitis B    HTN (hypertension)    Hyperlipidemia    Osteoporosis

## 2020-01-06 DIAGNOSIS — I48.19 PERSISTENT ATRIAL FIBRILLATION (H): ICD-10-CM

## 2020-01-06 RX ORDER — WARFARIN SODIUM 5 MG/1
TABLET ORAL
Qty: 30 TABLET | Refills: 0 | Status: SHIPPED | OUTPATIENT
Start: 2020-01-06 | End: 2020-02-25

## 2020-01-10 ENCOUNTER — ANTICOAGULATION THERAPY VISIT (OUTPATIENT)
Dept: ANTICOAGULATION | Facility: CLINIC | Age: 85
End: 2020-01-10
Payer: COMMERCIAL

## 2020-01-10 VITALS — HEART RATE: 88 BPM | DIASTOLIC BLOOD PRESSURE: 84 MMHG | SYSTOLIC BLOOD PRESSURE: 144 MMHG

## 2020-01-10 DIAGNOSIS — Z79.01 LONG TERM CURRENT USE OF ANTICOAGULANT THERAPY: ICD-10-CM

## 2020-01-10 DIAGNOSIS — I48.91 ATRIAL FIBRILLATION, UNSPECIFIED TYPE (H): ICD-10-CM

## 2020-01-10 LAB — INR POINT OF CARE: 2.3 (ref 0.9–1.1)

## 2020-01-10 PROCEDURE — 36416 COLLJ CAPILLARY BLOOD SPEC: CPT

## 2020-01-10 PROCEDURE — 85610 PROTHROMBIN TIME: CPT | Mod: QW

## 2020-01-10 PROCEDURE — 99207 ZZC NO CHARGE NURSE ONLY: CPT

## 2020-01-10 NOTE — PROGRESS NOTES
ANTICOAGULATION FOLLOW-UP CLINIC VISIT    Patient Name:  Jamal Francis  Date:  1/10/2020  Contact Type:  Face to Face    SUBJECTIVE:  Patient Findings     Comments:   The patient was assessed for diet, medication, and activity level changes, missed or extra doses, bruising or bleeding, with no problem findings.    Re: BP. Pt denies any chest pain, pressure, discomfort.   Livia Cassidy RN          Clinical Outcomes     Negatives:   Major bleeding event, Thromboembolic event, Anticoagulation-related hospital admission, Anticoagulation-related ED visit, Anticoagulation-related fatality    Comments:   The patient was assessed for diet, medication, and activity level changes, missed or extra doses, bruising or bleeding, with no problem findings.    Re: BP. Pt denies any chest pain, pressure, discomfort.   Livia Cassidy RN             OBJECTIVE    INR Protime   Date Value Ref Range Status   01/10/2020 2.3 (A) 0.9 - 1.1 Final       ASSESSMENT / PLAN  INR assessment THER    Recheck INR In: 6 WEEKS    INR Location Clinic      Anticoagulation Summary  As of 1/10/2020    INR goal:   2.0-3.0   TTR:   37.9 % (1 y)   INR used for dosin.3 (1/10/2020)   Warfarin maintenance plan:   5 mg (5 mg x 1) every day   Full warfarin instructions:   5 mg every day   Weekly warfarin total:   35 mg   No change documented:   Livia Cassidy RN   Plan last modified:   Livia Cassidy RN (2019)   Next INR check:   2020   Priority:   Maintenance   Target end date:       Indications    Atrial fibrillation (H) [I48.91]  Long-term (current) use of anticoagulants [Z79.01] [Z79.01]             Anticoagulation Episode Summary     INR check location:       Preferred lab:       Send INR reminders to:   [facility]    Comments:   5 mg tablets, dosing card, pt does not like to make an appt during INR check- just let him know when he is due next and he will call and schedule an appt around that time.          Anticoagulation Care Providers     Provider Role Specialty Phone number    Krystle Goodwin RN Responsible              See the Encounter Report to view Anticoagulation Flowsheet and Dosing Calendar (Go to Encounters tab in chart review, and find the Anticoagulation Therapy Visit)    Dosage adjustment made based on physician directed care plan.      Livia Cassidy RN

## 2020-02-21 ENCOUNTER — ANTICOAGULATION THERAPY VISIT (OUTPATIENT)
Dept: ANTICOAGULATION | Facility: CLINIC | Age: 85
End: 2020-02-21
Payer: COMMERCIAL

## 2020-02-21 VITALS — DIASTOLIC BLOOD PRESSURE: 72 MMHG | HEART RATE: 99 BPM | SYSTOLIC BLOOD PRESSURE: 130 MMHG

## 2020-02-21 DIAGNOSIS — I48.91 ATRIAL FIBRILLATION, UNSPECIFIED TYPE (H): ICD-10-CM

## 2020-02-21 DIAGNOSIS — Z79.01 LONG TERM CURRENT USE OF ANTICOAGULANT THERAPY: ICD-10-CM

## 2020-02-21 LAB — INR POINT OF CARE: 1.9 (ref 0.9–1.1)

## 2020-02-21 PROCEDURE — 99207 ZZC NO CHARGE NURSE ONLY: CPT

## 2020-02-21 PROCEDURE — 36416 COLLJ CAPILLARY BLOOD SPEC: CPT

## 2020-02-21 PROCEDURE — 85610 PROTHROMBIN TIME: CPT | Mod: QW

## 2020-02-21 NOTE — PROGRESS NOTES
ANTICOAGULATION FOLLOW-UP CLINIC VISIT    Patient Name:  Jamal Francis  Date:  2020  Contact Type:  Face to Face    SUBJECTIVE:  Patient Findings     Comments:   Patient denies any identifiable changes that caused the subtherapeutic INR.     Livia Cassidy RN          Clinical Outcomes     Comments:   Patient denies any identifiable changes that caused the subtherapeutic INR.     Livia Cassidy RN             OBJECTIVE    INR Protime   Date Value Ref Range Status   2020 1.9 (A) 0.9 - 1.1 Final       ASSESSMENT / PLAN  INR assessment SUB    Recheck INR In: 6 WEEKS    INR Location Clinic      Anticoagulation Summary  As of 2020    INR goal:   2.0-3.0   TTR:   46.5 % (1 y)   INR used for dosin.9! (2020)   Warfarin maintenance plan:   5 mg (5 mg x 1) every day   Full warfarin instructions:   : 7.5 mg; Otherwise 5 mg every day   Weekly warfarin total:   35 mg   Plan last modified:   Livia Cassidy RN (2019)   Next INR check:   4/3/2020   Priority:   Maintenance   Target end date:       Indications    Atrial fibrillation (H) [I48.91]  Long-term (current) use of anticoagulants [Z79.01] [Z79.01]             Anticoagulation Episode Summary     INR check location:       Preferred lab:       Send INR reminders to:   [facility]    Comments:   5 mg tablets, dosing card, pt does not like to make an appt during INR check- just let him know when he is due next and he will call and schedule an appt around that time.         Anticoagulation Care Providers     Provider Role Specialty Phone number    Catarino Landrum DO [facility] Internal Medicine [phone]            See the Encounter Report to view Anticoagulation Flowsheet and Dosing Calendar (Go to Encounters tab in chart review, and find the Anticoagulation Therapy Visit)    Dosage adjustment made based on physician directed care plan.      Livia Cassidy RN

## 2020-02-24 DIAGNOSIS — I48.19 PERSISTENT ATRIAL FIBRILLATION (H): ICD-10-CM

## 2020-02-25 RX ORDER — WARFARIN SODIUM 5 MG/1
TABLET ORAL
Qty: 90 TABLET | Refills: 0 | Status: SHIPPED | OUTPATIENT
Start: 2020-02-25 | End: 2020-06-09

## 2020-06-08 DIAGNOSIS — I48.19 PERSISTENT ATRIAL FIBRILLATION (H): ICD-10-CM

## 2020-06-09 RX ORDER — WARFARIN SODIUM 5 MG/1
TABLET ORAL
Qty: 30 TABLET | Refills: 0 | Status: SHIPPED | OUTPATIENT
Start: 2020-06-09 | End: 2020-08-10

## 2020-07-27 ENCOUNTER — TELEPHONE (OUTPATIENT)
Dept: ANTICOAGULATION | Facility: OTHER | Age: 85
End: 2020-07-27

## 2020-07-27 ENCOUNTER — ANTICOAGULATION THERAPY VISIT (OUTPATIENT)
Dept: ANTICOAGULATION | Facility: OTHER | Age: 85
End: 2020-07-27

## 2020-07-27 DIAGNOSIS — Z79.01 LONG TERM CURRENT USE OF ANTICOAGULANT THERAPY: ICD-10-CM

## 2020-07-27 DIAGNOSIS — I48.91 ATRIAL FIBRILLATION, UNSPECIFIED TYPE (H): ICD-10-CM

## 2020-07-27 DIAGNOSIS — Z79.01 LONG TERM CURRENT USE OF ANTICOAGULANTS WITH INR GOAL OF 2.0-3.0: Primary | ICD-10-CM

## 2020-07-27 LAB — INR BLD: 4.3 (ref 0.86–1.14)

## 2020-07-27 PROCEDURE — 99207 ZZC NO CHARGE NURSE ONLY: CPT | Performed by: INTERNAL MEDICINE

## 2020-07-27 PROCEDURE — 85610 PROTHROMBIN TIME: CPT | Mod: QW | Performed by: INTERNAL MEDICINE

## 2020-07-27 PROCEDURE — 36416 COLLJ CAPILLARY BLOOD SPEC: CPT | Performed by: INTERNAL MEDICINE

## 2020-07-27 NOTE — PROGRESS NOTES
ANTICOAGULATION FOLLOW-UP CLINIC VISIT    Patient Name:  Jamal Francis  Date:  2020  Contact Type:  Telephone    SUBJECTIVE:  Patient Findings     Positives:   Change in medications (Pt has taken some OTC aspirin)             OBJECTIVE    Recent labs: (last 7 days)     20  0944   INR 4.3*       ASSESSMENT / PLAN  INR assessment SUPRA    Recheck INR In: 3 WEEKS    INR Location Outside lab      Anticoagulation Summary  As of 2020    INR goal:   2.0-3.0   TTR:   50.2 % (6.9 mo)   INR used for dosin.3! (2020)   Warfarin maintenance plan:   5 mg (5 mg x 1) every day   Full warfarin instructions:   : Hold; Otherwise 5 mg every day   Weekly warfarin total:   35 mg   Plan last modified:   Livia Cassidy RN (2019)   Next INR check:   2020   Priority:   Maintenance   Target end date:       Indications    Atrial fibrillation (H) [I48.91]  Long-term (current) use of anticoagulants [Z79.01] [Z79.01]             Anticoagulation Episode Summary     INR check location:       Preferred lab:       Send INR reminders to:   [facility]    Comments:   5 mg tablets, dosing card, pt does not like to make an appt during INR check- just let him know when he is due next and he will call and schedule an appt around that time.         Anticoagulation Care Providers     Provider Role Specialty Phone number    Catarino Landrum DO [facility] Internal Medicine [phone]            See the Encounter Report to view Anticoagulation Flowsheet and Dosing Calendar (Go to Encounters tab in chart review, and find the Anticoagulation Therapy Visit)    Dosage adjustment made based on physician directed care plan.    Marilee Feliciano RN

## 2020-07-27 NOTE — TELEPHONE ENCOUNTER
I have attempted to contact this patient by phone with the following results: no answer. VM left asking him to call the [facility] back to discuss. Marilee Feliciano RN, BSN    Episode has already been created.

## 2020-08-10 DIAGNOSIS — I48.19 PERSISTENT ATRIAL FIBRILLATION (H): ICD-10-CM

## 2020-08-10 RX ORDER — WARFARIN SODIUM 5 MG/1
TABLET ORAL
Qty: 30 TABLET | Refills: 0 | Status: SHIPPED | OUTPATIENT
Start: 2020-08-10 | End: 2020-10-06

## 2020-10-06 DIAGNOSIS — I48.19 PERSISTENT ATRIAL FIBRILLATION (H): ICD-10-CM

## 2020-10-06 RX ORDER — WARFARIN SODIUM 5 MG/1
TABLET ORAL
Qty: 30 TABLET | Refills: 0 | Status: SHIPPED | OUTPATIENT
Start: 2020-10-06 | End: 2021-01-11

## 2020-10-09 DIAGNOSIS — I48.20 CHRONIC ATRIAL FIBRILLATION (H): ICD-10-CM

## 2020-10-09 RX ORDER — DILTIAZEM HYDROCHLORIDE 30 MG/1
TABLET, FILM COATED ORAL
Qty: 45 TABLET | Refills: 0 | Status: SHIPPED | OUTPATIENT
Start: 2020-10-09 | End: 2021-08-27

## 2020-10-14 NOTE — TELEPHONE ENCOUNTER
Called patient and  Left message to return call to clinic and speak with scheduling   
Medication is being filled for 1 time refill only due to:  Patient needs to be seen because it has been more than one year since last visit.    Please call patient to schedule an appointment.    Blanca Dobbins RN on 10/9/2020 at 1:34 PM    
Spoke with pt and wife, message relayed. Wife stated they are very busy over the next few weeks so she will call back to schedule appt. Both expressed understanding that appointment is required for further refills.  
no diabetes and no thyroid trouble.

## 2021-01-11 ENCOUNTER — ANTICOAGULATION THERAPY VISIT (OUTPATIENT)
Dept: ANTICOAGULATION | Facility: CLINIC | Age: 86
End: 2021-01-11

## 2021-01-11 DIAGNOSIS — I48.19 PERSISTENT ATRIAL FIBRILLATION (H): ICD-10-CM

## 2021-01-11 DIAGNOSIS — Z79.01 LONG TERM CURRENT USE OF ANTICOAGULANT THERAPY: ICD-10-CM

## 2021-01-11 DIAGNOSIS — Z79.01 LONG TERM CURRENT USE OF ANTICOAGULANTS WITH INR GOAL OF 2.0-3.0: ICD-10-CM

## 2021-01-11 DIAGNOSIS — I48.91 ATRIAL FIBRILLATION, UNSPECIFIED TYPE (H): ICD-10-CM

## 2021-01-11 LAB
CAPILLARY BLOOD COLLECTION: NORMAL
INR BLD: 1.1 (ref 0.86–1.14)

## 2021-01-11 PROCEDURE — 85610 PROTHROMBIN TIME: CPT | Performed by: INTERNAL MEDICINE

## 2021-01-11 PROCEDURE — 36416 COLLJ CAPILLARY BLOOD SPEC: CPT | Performed by: INTERNAL MEDICINE

## 2021-01-11 RX ORDER — WARFARIN SODIUM 5 MG/1
TABLET ORAL
Qty: 30 TABLET | Refills: 0 | Status: SHIPPED | OUTPATIENT
Start: 2021-01-11 | End: 2021-02-05

## 2021-01-11 NOTE — PROGRESS NOTES
ANTICOAGULATION MANAGEMENT     Patient Name:  Jamal Francis  Date:  2021    ASSESSMENT /SUBJECTIVE:    Today's INR result of 1.1 is subtherapeutic. Goal INR of 2.0-3.0      Warfarin dose taken: Missed dose(s) may be affecting INR and Pt has been out of his coumadin for over a month. I have sent a new prescription and he will recheck in 2 weeks    Diet: No new diet changes affecting INR    Medication changes/ interactions: No new medications/supplements affecting INR    Previous INR: Supratherapeutic     S/S of bleeding or thromboembolism: No    New injury or illness: No    Upcoming surgery, procedure or cardioversion: No    Additional findings: None      PLAN:    Telephone call with Jamal regarding INR result and instructed:     Warfarin Dosing Instructions: Continue your current warfarin dose 5 mg daily    Instructed patient to follow up no later than: 2 weeks  Patient offered & declined to schedule next visit    Education provided: Please call back if any changes to your diet, medications or how you've been taking warfarin, Target INR goal and significance of current INR result, Importance of therapeutic range, Importance of following up for INR monitoring at instructed interval, Importance of taking warfarin as instructed and Contact [facility] Anticoagulation: [phone]  with any changes, questions or concerns at       Jamal verbalizes understanding and agrees to warfarin dosing plan.    Instructed to call the Anticoagulation Clinic for any changes, questions or concerns. (#[phone])        Marilee Feliciano RN      OBJECTIVE:  Recent labs: (last 7 days)     21  1023   INR 1.1         No question data found.  Anticoagulation Summary  As of 2021    INR goal:  2.0-3.0   TTR:  73.8 % (1.3 mo)   INR used for dosin.1 (2021)   Warfarin maintenance plan:  5 mg (5 mg x 1) every day   Full warfarin instructions:  5 mg every day   Weekly warfarin total:  35 mg   Plan last  modified:  Livia Cassidy, RN (11/22/2019)   Next INR check:     Priority:  Maintenance   Target end date:      Indications    Atrial fibrillation (H) [I48.91]  Long-term (current) use of anticoagulants [Z79.01] [Z79.01]             Anticoagulation Episode Summary     INR check location:      Preferred lab:      Send INR reminders to:  [facility]    Comments:  5 mg tablets, dosing card, pt does not like to make an appt during INR check- just let him know when he is due next and he will call and schedule an appt around that time.         Anticoagulation Care Providers     Provider Role Specialty Phone number    Catarino Landrum DO [facility] Internal Medicine [phone]

## 2021-02-04 ENCOUNTER — TELEPHONE (OUTPATIENT)
Dept: ANTICOAGULATION | Facility: CLINIC | Age: 86
End: 2021-02-04

## 2021-02-04 DIAGNOSIS — Z79.01 LONG TERM CURRENT USE OF ANTICOAGULANT THERAPY: Primary | ICD-10-CM

## 2021-02-04 DIAGNOSIS — I48.91 ATRIAL FIBRILLATION, UNSPECIFIED TYPE (H): ICD-10-CM

## 2021-02-04 NOTE — TELEPHONE ENCOUNTER
ANTICOAGULATION MANAGEMENT      Jamal Francis due for annual renewal of referral to anticoagulation monitoring. Order pended for your review and signature.      ANTICOAGULATION SUMMARY      Warfarin indication(s)     Atrial fibrillation    Heart valve present?  NO       Current goal range   INR: 2.0-3.0     Goal appropriate for indication? Yes, INR 2-3 appropriate for hx of DVT, PE, hypercoagulable state, Afib, LVAD, or bileaflet AVR without risk factors     Current duration of therapy Indefinite/long term therapy   Time in Therapeutic Range (TTR)  (Goal > 60%) 73.8 %       Office visit with referring provider's group within last year no       Marilee Feliciano RN

## 2021-02-04 NOTE — TELEPHONE ENCOUNTER
ANTICOAGULATION     Jamal Francis is overdue for INR check.      spoke with Spouse who declined to schedule a lab appointment at this time. If calling back please schedule as soon as possible.    Marilee Feliciano RN

## 2021-02-05 ENCOUNTER — ANTICOAGULATION THERAPY VISIT (OUTPATIENT)
Dept: ANTICOAGULATION | Facility: CLINIC | Age: 86
End: 2021-02-05

## 2021-02-05 DIAGNOSIS — Z79.01 LONG TERM CURRENT USE OF ANTICOAGULANTS WITH INR GOAL OF 2.0-3.0: ICD-10-CM

## 2021-02-05 DIAGNOSIS — Z79.01 LONG TERM CURRENT USE OF ANTICOAGULANT THERAPY: ICD-10-CM

## 2021-02-05 DIAGNOSIS — I48.19 PERSISTENT ATRIAL FIBRILLATION (H): ICD-10-CM

## 2021-02-05 DIAGNOSIS — I48.91 ATRIAL FIBRILLATION, UNSPECIFIED TYPE (H): ICD-10-CM

## 2021-02-05 LAB
CAPILLARY BLOOD COLLECTION: NORMAL
INR BLD: 1.1 (ref 0.86–1.14)

## 2021-02-05 PROCEDURE — 85610 PROTHROMBIN TIME: CPT | Performed by: INTERNAL MEDICINE

## 2021-02-05 PROCEDURE — 36416 COLLJ CAPILLARY BLOOD SPEC: CPT | Performed by: INTERNAL MEDICINE

## 2021-02-05 RX ORDER — WARFARIN SODIUM 5 MG/1
TABLET ORAL
Qty: 30 TABLET | Refills: 0 | Status: SHIPPED | OUTPATIENT
Start: 2021-02-05 | End: 2021-03-09

## 2021-02-05 NOTE — PROGRESS NOTES
ANTICOAGULATION MANAGEMENT     Patient Name:  Jamal Francis  Date:  2021    ASSESSMENT /SUBJECTIVE:    Today's INR result of 1.1 is subtherapeutic. Goal INR of 2.0-3.0      Warfarin dose taken: Less warfarin taken than planned which may be affecting INR and Missed dose(s) may be affecting INR- per wife, pt missed a couple doses this last week as he ran out of medication.     Diet: No new diet changes affecting INR    Medication changes/ interactions: No new medications/supplements affecting INR    Previous INR: Subtherapeutic     S/S of bleeding or thromboembolism: No    New injury or illness: No    Upcoming surgery, procedure or cardioversion: No    Additional findings: None      PLAN:    Telephone call with  wifealeah regarding INR result and instructed:     Warfarin Dosing Instructions: 7.5 mg tonight and tomorrow  then continue your current warfarin dose of 5 mg daily    Instructed patient to follow up no later than: 1 week  Patient offered & declined to schedule next visit - I strongly encouraged Aleah to be sure they have his INR rechecked in the next week, as he is at risk for having a stroke/clot. She reported understanding.     Education provided: Target INR goal and significance of current INR result, Importance of therapeutic range, Importance of following up for INR monitoring at instructed interval and Importance of taking warfarin as instructed      Aleah verbalizes understanding and agrees to warfarin dosing plan.    Instructed to call the Anticoagulation Clinic for any changes, questions or concerns. (#[phone])        Livia Cassidy RN      OBJECTIVE:  Recent labs: (last 7 days)     21  1315   INR 1.1         INR assessment SUB    Recheck INR In: 1 WEEK    INR Location Outside lab      Anticoagulation Summary  As of 2021    INR goal:  2.0-3.0   TTR:  11.2 % (1.3 mo)   INR used for dosin.1 (2021)   Warfarin maintenance plan:  5 mg (5 mg x 1) every day   Full warfarin  instructions:  2/5: 7.5 mg; 2/6: 7.5 mg; Otherwise 5 mg every day   Weekly warfarin total:  35 mg   Plan last modified:  Livia Cassidy RN (11/22/2019)   Next INR check:  2/12/2021   Priority:  High   Target end date:  Indefinite    Indications    Atrial fibrillation (H) [I48.91]  Long-term (current) use of anticoagulants [Z79.01] [Z79.01]  Atrial fibrillation  unspecified type (H) [I48.91]             Anticoagulation Episode Summary     INR check location:      Preferred lab:      Send INR reminders to:  [facility]    Comments:  5 mg tablets, dosing card, pt does not like to make an appt during INR check- just let him know when he is due next and he will call and schedule an appt around that time.         Anticoagulation Care Providers     Provider Role Specialty Phone number    Catarino Landrum DO [facility] Internal Medicine [phone]

## 2021-02-11 ENCOUNTER — IMMUNIZATION (OUTPATIENT)
Dept: FAMILY MEDICINE | Facility: CLINIC | Age: 86
End: 2021-02-11
Payer: COMMERCIAL

## 2021-02-11 PROCEDURE — 91300 PR COVID VAC PFIZER DIL RECON 30 MCG/0.3 ML IM: CPT

## 2021-02-11 PROCEDURE — 0001A PR COVID VAC PFIZER DIL RECON 30 MCG/0.3 ML IM: CPT

## 2021-02-16 ENCOUNTER — TELEPHONE (OUTPATIENT)
Dept: ANTICOAGULATION | Facility: CLINIC | Age: 86
End: 2021-02-16

## 2021-02-16 NOTE — TELEPHONE ENCOUNTER
ANTICOAGULATION     Jamal Francis is overdue for INR check.      spoke with spouse who declined to schedule a lab appointment at this time. If calling back please schedule as soon as possible.    Miladys Bentley RN

## 2021-02-25 ENCOUNTER — TELEPHONE (OUTPATIENT)
Dept: ANTICOAGULATION | Facility: CLINIC | Age: 86
End: 2021-02-25

## 2021-02-25 NOTE — TELEPHONE ENCOUNTER
ANTICOAGULATION     Jamal Francis is overdue for INR check.      Spoke with Spouse  and scheduled lab appointment on 3/4/21    Marilee Feliciano RN

## 2021-03-04 ENCOUNTER — IMMUNIZATION (OUTPATIENT)
Dept: FAMILY MEDICINE | Facility: CLINIC | Age: 86
End: 2021-03-04
Attending: FAMILY MEDICINE
Payer: COMMERCIAL

## 2021-03-04 PROCEDURE — 91300 PR COVID VAC PFIZER DIL RECON 30 MCG/0.3 ML IM: CPT

## 2021-03-04 PROCEDURE — 0002A PR COVID VAC PFIZER DIL RECON 30 MCG/0.3 ML IM: CPT

## 2021-03-09 ENCOUNTER — TELEPHONE (OUTPATIENT)
Dept: ANTICOAGULATION | Facility: CLINIC | Age: 86
End: 2021-03-09

## 2021-03-09 ENCOUNTER — TELEPHONE (OUTPATIENT)
Dept: INTERNAL MEDICINE | Facility: CLINIC | Age: 86
End: 2021-03-09

## 2021-03-09 DIAGNOSIS — I48.91 ATRIAL FIBRILLATION, UNSPECIFIED TYPE (H): ICD-10-CM

## 2021-03-09 DIAGNOSIS — I48.19 PERSISTENT ATRIAL FIBRILLATION (H): ICD-10-CM

## 2021-03-09 DIAGNOSIS — Z79.01 LONG TERM CURRENT USE OF ANTICOAGULANT THERAPY: ICD-10-CM

## 2021-03-09 RX ORDER — WARFARIN SODIUM 5 MG/1
TABLET ORAL
Qty: 14 TABLET | Refills: 0 | Status: SHIPPED | OUTPATIENT
Start: 2021-03-09 | End: 2021-11-30

## 2021-03-09 NOTE — TELEPHONE ENCOUNTER
Attempted to contact pt, unable to leave a VM. Will need to try again later.   Last INR was 1.1 on 2/5. Wife was spoken with regarding overdue INR on 2/25 and an appt was scheduled for 3/4 but this was not done.   Pt needs to make an appt to have his INR checked ASAP.   Livia Cassidy RN

## 2021-03-09 NOTE — TELEPHONE ENCOUNTER
Attempted to contact pt regarding refill for Coumadin, unable to leave a VM. Will need to try again later.   Last INR was 1.1 on 2/5. Wife was spoken with regarding overdue INR on 2/25 and an appt was scheduled for 3/4 but this was not done.   Pt needs to make an appt to have his INR checked ASAP.   Livia Cassidy RN

## 2021-03-09 NOTE — TELEPHONE ENCOUNTER
I spoke with pt's wife who states that pt has not been taking the Coumadin everyday - there are some days that he has missed them.   I have informed her of the increased risk of him not taking coumadin daily and what the last two results of 1.1 mean. I encouraged pt to be seen this week but she states that he is unable to come in. She also states that the pills he has on hand seem to be . I informed her that I have sent an RX to the pharmacy for pt to take 5 mg daily and recheck in 1 week. She declined making an appt at this time.   Two weeks refilled of Sangeeta at this time    Livia Cassidy RN

## 2021-03-12 NOTE — TELEPHONE ENCOUNTER
ANTICOAGULATION     Outreach 3     Jamal Francis is overdue for INR check.      spoke with pt's wife  who declined to schedule a lab appointment at this time. If calling back please schedule as soon as possible. see refill encounter from 3/9     Livia Cassidy RN

## 2021-03-13 ENCOUNTER — HEALTH MAINTENANCE LETTER (OUTPATIENT)
Age: 86
End: 2021-03-13

## 2021-04-02 ENCOUNTER — ANTICOAGULATION THERAPY VISIT (OUTPATIENT)
Dept: ANTICOAGULATION | Facility: CLINIC | Age: 86
End: 2021-04-02

## 2021-04-02 DIAGNOSIS — I48.91 ATRIAL FIBRILLATION (H): ICD-10-CM

## 2021-04-02 DIAGNOSIS — Z79.01 LONG TERM CURRENT USE OF ANTICOAGULANTS WITH INR GOAL OF 2.0-3.0: ICD-10-CM

## 2021-04-02 DIAGNOSIS — I48.91 ATRIAL FIBRILLATION, UNSPECIFIED TYPE (H): ICD-10-CM

## 2021-04-02 DIAGNOSIS — I48.19 PERSISTENT ATRIAL FIBRILLATION (H): ICD-10-CM

## 2021-04-02 DIAGNOSIS — Z79.01 LONG TERM CURRENT USE OF ANTICOAGULANT THERAPY: ICD-10-CM

## 2021-04-02 LAB
CAPILLARY BLOOD COLLECTION: NORMAL
INR BLD: 3.6 (ref 0.86–1.14)

## 2021-04-02 PROCEDURE — 36416 COLLJ CAPILLARY BLOOD SPEC: CPT | Performed by: INTERNAL MEDICINE

## 2021-04-02 PROCEDURE — 85610 PROTHROMBIN TIME: CPT | Performed by: INTERNAL MEDICINE

## 2021-04-02 RX ORDER — WARFARIN SODIUM 5 MG/1
TABLET ORAL
Qty: 30 TABLET | Refills: 0 | Status: SHIPPED | OUTPATIENT
Start: 2021-04-02 | End: 2021-06-28

## 2021-04-02 NOTE — PROGRESS NOTES
ANTICOAGULATION MANAGEMENT     Patient Name:  Jamal Francis  Date:  4/2/2021    ASSESSMENT /SUBJECTIVE:    Today's INR result of 3.6 is supratherapeutic. Goal INR of 2.0-3.0      Warfarin dose taken: Warfarin taken as instructed    Diet: No new diet changes affecting INR    Medication changes/ interactions: No new medications/supplements affecting INR    Previous INR: Subtherapeutic     S/S of bleeding or thromboembolism: No    New injury or illness: No    Upcoming surgery, procedure or cardioversion: No    Additional findings: None      PLAN:    Telephone call with  spouse regarding INR result and instructed:     Warfarin Dosing Instructions: Change your warfarin dose to 2.5 mg Sat and 5 mg all other days  . (7.1 % change)    Instructed patient to follow up no later than: 2 weeks  Patient offered & declined to schedule next visit    Education provided: Importance of following up for INR monitoring at instructed interval      Spouse verbalizes understanding and agrees to warfarin dosing plan.    Instructed to call the Anticoagulation Clinic for any changes, questions or concerns. (#[phone])        Miladys Bentley RN      OBJECTIVE:  Recent labs: (last 7 days)     04/02/21  1001   INR 3.6*         INR assessment SUPRA    Recheck INR In: 2 WEEKS    INR Location Outside lab      Anticoagulation Summary  As of 4/2/2021    INR goal:  2.0-3.0   TTR:  27.6 % (2.7 mo)   INR used for dosing:  3.6 (4/2/2021)   Warfarin maintenance plan:  2.5 mg (5 mg x 0.5) every Sat; 5 mg (5 mg x 1) all other days   Full warfarin instructions:  2.5 mg every Sat; 5 mg all other days   Weekly warfarin total:  32.5 mg   Plan last modified:  Miladys Bentley, RN (4/2/2021)   Next INR check:  4/16/2021   Priority:  High   Target end date:  Indefinite    Indications    Atrial fibrillation (H) [I48.91]  Long-term (current) use of anticoagulants [Z79.01] [Z79.01]  Atrial fibrillation  unspecified type (H) [I48.91]             Anticoagulation  Episode Summary     INR check location:      Preferred lab:      Send INR reminders to:  [facility]    Comments:  5 mg tablets, AM dose        Anticoagulation Care Providers     Provider Role Specialty Phone number    Catarino Landrum DO Referring Internal Medicine [phone]

## 2021-05-26 ENCOUNTER — TELEPHONE (OUTPATIENT)
Dept: ANTICOAGULATION | Facility: CLINIC | Age: 86
End: 2021-05-26

## 2021-05-26 NOTE — TELEPHONE ENCOUNTER
ANTICOAGULATION     Outreach 1    Jamal Francis is overdue for INR check.      Spoke with Aleah and scheduled lab appointment on 6/2    Livia Cassidy RN

## 2021-06-02 ENCOUNTER — ANTICOAGULATION THERAPY VISIT (OUTPATIENT)
Dept: ANTICOAGULATION | Facility: CLINIC | Age: 86
End: 2021-06-02

## 2021-06-02 DIAGNOSIS — I48.91 ATRIAL FIBRILLATION, UNSPECIFIED TYPE (H): ICD-10-CM

## 2021-06-02 DIAGNOSIS — Z79.01 LONG TERM CURRENT USE OF ANTICOAGULANT THERAPY: ICD-10-CM

## 2021-06-02 DIAGNOSIS — I48.91 ATRIAL FIBRILLATION (H): ICD-10-CM

## 2021-06-02 DIAGNOSIS — Z79.01 LONG TERM CURRENT USE OF ANTICOAGULANTS WITH INR GOAL OF 2.0-3.0: ICD-10-CM

## 2021-06-02 LAB
CAPILLARY BLOOD COLLECTION: NORMAL
INR BLD: 1.2 (ref 0.86–1.14)

## 2021-06-02 PROCEDURE — 36416 COLLJ CAPILLARY BLOOD SPEC: CPT | Performed by: INTERNAL MEDICINE

## 2021-06-02 PROCEDURE — 85610 PROTHROMBIN TIME: CPT | Performed by: INTERNAL MEDICINE

## 2021-06-02 NOTE — PROGRESS NOTES
Anticoagulation Management    Unable to reach pt today.    Today's INR result of 1.2 is subtherapeutic (goal INR of 2.0-3.0).  Result received from: Clinic Lab    Follow up required to discuss out of range INR     No instructions provided. Unable to leave voicemail.      Anticoagulation clinic to follow up    Miladys Bentley RN

## 2021-06-02 NOTE — PROGRESS NOTES
ANTICOAGULATION MANAGEMENT     Patient Name:  Jamal Francis  Date:  2021    ASSESSMENT /SUBJECTIVE:    Today's INR result of 1.2 is subtherapeutic. Goal INR of 2.0-3.0      Warfarin dose taken: Less warfarin taken than planned which may be affecting INR spouse reports she thinks he's been taking 2.5 mg daily    Diet: No new diet changes affecting INR    Medication changes/ interactions: No new medications/supplements affecting INR    Previous INR: Supratherapeutic     S/S of bleeding or thromboembolism: No    New injury or illness: No    Upcoming surgery, procedure or cardioversion: No    Additional findings: None      PLAN:    Warfarin Dosing Instructions: Take ONE day of 2.5 mg (Sat) and 5 mg all other days    Instructed patient to follow up no later than: 10 days  Patient offered & declined to schedule next visit    Education provided: Importance of following up for INR monitoring at instructed interval and Importance of taking warfarin as instructed    Telephone call with  spouse whom verbalizes understanding and agrees to plan    Instructed to call the Anticoagulation Clinic for any changes, questions or concerns. (#[phone])        Miladys Bentley RN      OBJECTIVE:  Recent labs: (last 7 days)     21  0837   INR 1.2*         INR assessment SUB    Recheck INR In: 10 DAYS    INR Location Outside lab      Anticoagulation Summary  As of 2021    INR goal:  2.0-3.0   TTR:  33.6 % (4.7 mo)   INR used for dosin.2 (2021)   Warfarin maintenance plan:  2.5 mg (5 mg x 0.5) every Sat; 5 mg (5 mg x 1) all other days   Full warfarin instructions:  2.5 mg every Sat; 5 mg all other days   Weekly warfarin total:  32.5 mg   Plan last modified:  Miladys Bentley, RN (2021)   Next INR check:  6/10/2021   Priority:  High   Target end date:  Indefinite    Indications    Atrial fibrillation (H) [I48.91]  Long-term (current) use of anticoagulants [Z79.01] [Z79.01]  Atrial fibrillation  unspecified type (H)  [I48.91]             Anticoagulation Episode Summary     INR check location:      Preferred lab:      Send INR reminders to:  [facility]    Comments:  5 mg tablets, AM dose        Anticoagulation Care Providers     Provider Role Specialty Phone number    Catarino Landrum DO Referring Internal Medicine [phone]

## 2021-06-24 ENCOUNTER — TELEPHONE (OUTPATIENT)
Dept: ANTICOAGULATION | Facility: CLINIC | Age: 86
End: 2021-06-24

## 2021-06-24 DIAGNOSIS — Z79.01 LONG TERM CURRENT USE OF ANTICOAGULANT THERAPY: ICD-10-CM

## 2021-06-24 DIAGNOSIS — I48.91 ATRIAL FIBRILLATION, UNSPECIFIED TYPE (H): Primary | ICD-10-CM

## 2021-06-24 NOTE — TELEPHONE ENCOUNTER
ANTICOAGULATION     Jamal Francis is overdue for INR check.      Was unable to reach patient and was unable to leave a voicemail. If patient calls, please schedule INR check as soon as possible.     Marilee Feliciano RN

## 2021-06-28 ENCOUNTER — TELEPHONE (OUTPATIENT)
Dept: INTERNAL MEDICINE | Facility: CLINIC | Age: 86
End: 2021-06-28

## 2021-06-28 DIAGNOSIS — Z79.01 LONG TERM CURRENT USE OF ANTICOAGULANT THERAPY: ICD-10-CM

## 2021-06-28 DIAGNOSIS — I48.19 PERSISTENT ATRIAL FIBRILLATION (H): ICD-10-CM

## 2021-06-28 DIAGNOSIS — I48.91 ATRIAL FIBRILLATION, UNSPECIFIED TYPE (H): ICD-10-CM

## 2021-06-28 RX ORDER — WARFARIN SODIUM 5 MG/1
TABLET ORAL
Qty: 15 TABLET | Refills: 0 | Status: SHIPPED | OUTPATIENT
Start: 2021-06-28 | End: 2021-08-05

## 2021-06-28 NOTE — TELEPHONE ENCOUNTER
ANTICOAGULATION     Jamal Francis is overdue for INR check.      Left message for patient to call and schedule lab appointment as soon as possible. If returning call, please schedule. Mychart message sent, given 2 weeks warfarin and asked to come in for INR.     Miladys Bentley RN

## 2021-06-28 NOTE — TELEPHONE ENCOUNTER
Reason for Call:  Other call back    Detailed comments: Patient's son is stating that Jamal needs a refill on his warfin. Requesting a refill. Son is asking for any messages to be sent to his mychart. He is hoping for a refill today.     Phone Number Patient can be reached at: Home number on file [phone] (home)    Best Time: any    Can we leave a detailed message on this number? YES     He uses [facility] pharmacy in [place].     Call taken on 6/28/2021 at 4:37 PM by Dea Melendez CNA

## 2021-07-13 ENCOUNTER — TELEPHONE (OUTPATIENT)
Dept: ANTICOAGULATION | Facility: CLINIC | Age: 86
End: 2021-07-13

## 2021-07-13 NOTE — TELEPHONE ENCOUNTER
ANTICOAGULATION - Outreach 2    Jamal Francis is overdue for INR check.      spoke with pt's wife who declined to schedule a lab appointment at this time. If calling back please schedule as soon as possible. She states they have to rely on someone else to drive them, so they will need to figure out transportation     No letter mailed since I spoke with pt and his wife.     Livia Cassidy RN

## 2021-08-04 ENCOUNTER — TELEPHONE (OUTPATIENT)
Dept: INTERNAL MEDICINE | Facility: CLINIC | Age: 86
End: 2021-08-04

## 2021-08-04 DIAGNOSIS — I48.91 ATRIAL FIBRILLATION, UNSPECIFIED TYPE (H): ICD-10-CM

## 2021-08-04 DIAGNOSIS — I48.19 PERSISTENT ATRIAL FIBRILLATION (H): ICD-10-CM

## 2021-08-04 DIAGNOSIS — Z79.01 LONG TERM CURRENT USE OF ANTICOAGULANT THERAPY: ICD-10-CM

## 2021-08-04 NOTE — TELEPHONE ENCOUNTER
Wife is here for an appointment.  She is reporting patient has 2 Coumadin pills left.  Patient is not here in clinic with her.      Patient has not been seen in clinic since 2019.    Last INR was 1.2 on 6/2/21.  INR gave him a luis armando refill as he is overdue for lab check with low INR last time.  Routing to PCP for further advice.    Christina Severino RN

## 2021-08-05 RX ORDER — WARFARIN SODIUM 5 MG/1
TABLET ORAL
Qty: 15 TABLET | Refills: 0 | Status: SHIPPED | OUTPATIENT
Start: 2021-08-05 | End: 2021-08-27

## 2021-08-05 NOTE — TELEPHONE ENCOUNTER
Patient is on Coumadin for chronic atrial fibrillation.  Therefore, his noncompliance is mildly less then acutely concerning.  Please set him up with an office visit sometime next week.    Lucita

## 2021-08-05 NOTE — TELEPHONE ENCOUNTER
Received VM from Jamal's daughter ([phone], no CTC on file) stating that Jamal is out of warfarin and need a PCP appointment to get a refill. Jamal's daughter states that he has likely been missing doses of warfarin. Of note, Jamal's wife is in the hospital. Small refill already granted by provider. Please call back.   Respiratory at bedside     Stacy Ortega.  [facility], [address] [place] [address]  04/11/21 1355

## 2021-08-05 NOTE — TELEPHONE ENCOUNTER
Will forward to PCP as pt has been noncompliant and [facility] can not refill his coumadin until he has a current INR and appt with his PCP. Marilee Feliciano RN, BSN  [facility] Anticoagulation Team

## 2021-08-25 ENCOUNTER — TELEPHONE (OUTPATIENT)
Dept: ANTICOAGULATION | Facility: CLINIC | Age: 86
End: 2021-08-25

## 2021-08-25 NOTE — TELEPHONE ENCOUNTER
ANTICOAGULATION     Jamal Francis is overdue for INR check.      Was unable to reach patient and was unable to leave a voicemail. If patient calls, please schedule INR check as soon as possible.  Patient does have an appointment scheduled with provider on 8/27/21, added appointment notes to have INR checked.    Mattie Alvarez RN

## 2021-08-27 ENCOUNTER — OFFICE VISIT (OUTPATIENT)
Dept: FAMILY MEDICINE | Facility: CLINIC | Age: 86
End: 2021-08-27
Payer: COMMERCIAL

## 2021-08-27 ENCOUNTER — ANTICOAGULATION THERAPY VISIT (OUTPATIENT)
Dept: ANTICOAGULATION | Facility: CLINIC | Age: 86
End: 2021-08-27

## 2021-08-27 VITALS
HEIGHT: 64 IN | SYSTOLIC BLOOD PRESSURE: 132 MMHG | RESPIRATION RATE: 20 BRPM | WEIGHT: 147.4 LBS | TEMPERATURE: 97.4 F | BODY MASS INDEX: 25.16 KG/M2 | HEART RATE: 80 BPM | OXYGEN SATURATION: 100 % | DIASTOLIC BLOOD PRESSURE: 80 MMHG

## 2021-08-27 DIAGNOSIS — J43.9 PULMONARY EMPHYSEMA, UNSPECIFIED EMPHYSEMA TYPE (H): ICD-10-CM

## 2021-08-27 DIAGNOSIS — I48.91 ATRIAL FIBRILLATION, UNSPECIFIED TYPE (H): ICD-10-CM

## 2021-08-27 DIAGNOSIS — I48.91 ATRIAL FIBRILLATION (H): Primary | ICD-10-CM

## 2021-08-27 DIAGNOSIS — I48.19 PERSISTENT ATRIAL FIBRILLATION (H): ICD-10-CM

## 2021-08-27 DIAGNOSIS — Z79.01 LONG TERM CURRENT USE OF ANTICOAGULANT THERAPY: ICD-10-CM

## 2021-08-27 DIAGNOSIS — R41.3 MEMORY LOSS: ICD-10-CM

## 2021-08-27 DIAGNOSIS — Z00.00 ENCOUNTER FOR PREVENTIVE CARE: Primary | ICD-10-CM

## 2021-08-27 LAB
ALBUMIN SERPL-MCNC: 3.3 G/DL (ref 3.4–5)
ALP SERPL-CCNC: 110 U/L (ref 40–150)
ALT SERPL W P-5'-P-CCNC: 47 U/L (ref 0–70)
ANION GAP SERPL CALCULATED.3IONS-SCNC: 3 MMOL/L (ref 3–14)
AST SERPL W P-5'-P-CCNC: 28 U/L (ref 0–45)
BASOPHILS # BLD AUTO: 0.1 10E3/UL (ref 0–0.2)
BASOPHILS NFR BLD AUTO: 1 %
BILIRUB SERPL-MCNC: 0.4 MG/DL (ref 0.2–1.3)
BUN SERPL-MCNC: 16 MG/DL (ref 7–30)
CALCIUM SERPL-MCNC: 8.3 MG/DL (ref 8.5–10.1)
CHLORIDE BLD-SCNC: 108 MMOL/L (ref 94–109)
CHOLEST SERPL-MCNC: 145 MG/DL
CO2 SERPL-SCNC: 25 MMOL/L (ref 20–32)
CREAT SERPL-MCNC: 0.95 MG/DL (ref 0.66–1.25)
EOSINOPHIL # BLD AUTO: 0.2 10E3/UL (ref 0–0.7)
EOSINOPHIL NFR BLD AUTO: 3 %
ERYTHROCYTE [DISTWIDTH] IN BLOOD BY AUTOMATED COUNT: 14.3 % (ref 10–15)
FASTING STATUS PATIENT QL REPORTED: NO
GFR SERPL CREATININE-BSD FRML MDRD: 71 ML/MIN/1.73M2
GLUCOSE BLD-MCNC: 96 MG/DL (ref 70–99)
HCT VFR BLD AUTO: 33 % (ref 40–53)
HDLC SERPL-MCNC: 71 MG/DL
HGB BLD-MCNC: 10.7 G/DL (ref 13.3–17.7)
IMM GRANULOCYTES # BLD: 0 10E3/UL
IMM GRANULOCYTES NFR BLD: 0 %
INR PPP: 1.36 (ref 0.85–1.15)
LDLC SERPL CALC-MCNC: 66 MG/DL
LYMPHOCYTES # BLD AUTO: 1.3 10E3/UL (ref 0.8–5.3)
LYMPHOCYTES NFR BLD AUTO: 20 %
MCH RBC QN AUTO: 31.4 PG (ref 26.5–33)
MCHC RBC AUTO-ENTMCNC: 32.4 G/DL (ref 31.5–36.5)
MCV RBC AUTO: 97 FL (ref 78–100)
MONOCYTES # BLD AUTO: 0.6 10E3/UL (ref 0–1.3)
MONOCYTES NFR BLD AUTO: 10 %
NEUTROPHILS # BLD AUTO: 4.3 10E3/UL (ref 1.6–8.3)
NEUTROPHILS NFR BLD AUTO: 66 %
NONHDLC SERPL-MCNC: 74 MG/DL
NRBC # BLD AUTO: 0 10E3/UL
NRBC BLD AUTO-RTO: 0 /100
PLATELET # BLD AUTO: 311 10E3/UL (ref 150–450)
POTASSIUM BLD-SCNC: 3.9 MMOL/L (ref 3.4–5.3)
PROT SERPL-MCNC: 7.3 G/DL (ref 6.8–8.8)
RBC # BLD AUTO: 3.41 10E6/UL (ref 4.4–5.9)
SODIUM SERPL-SCNC: 136 MMOL/L (ref 133–144)
TRIGL SERPL-MCNC: 40 MG/DL
WBC # BLD AUTO: 6.5 10E3/UL (ref 4–11)

## 2021-08-27 PROCEDURE — 99397 PER PM REEVAL EST PAT 65+ YR: CPT | Performed by: PHYSICIAN ASSISTANT

## 2021-08-27 PROCEDURE — 80061 LIPID PANEL: CPT | Performed by: PHYSICIAN ASSISTANT

## 2021-08-27 PROCEDURE — 85025 COMPLETE CBC W/AUTO DIFF WBC: CPT | Performed by: PHYSICIAN ASSISTANT

## 2021-08-27 PROCEDURE — 36415 COLL VENOUS BLD VENIPUNCTURE: CPT | Performed by: PHYSICIAN ASSISTANT

## 2021-08-27 PROCEDURE — 85610 PROTHROMBIN TIME: CPT | Performed by: PHYSICIAN ASSISTANT

## 2021-08-27 PROCEDURE — 80053 COMPREHEN METABOLIC PANEL: CPT | Performed by: PHYSICIAN ASSISTANT

## 2021-08-27 RX ORDER — DILTIAZEM HYDROCHLORIDE 30 MG/1
15 TABLET, FILM COATED ORAL DAILY
Qty: 45 TABLET | Refills: 3 | Status: SHIPPED | OUTPATIENT
Start: 2021-08-27 | End: 2021-10-04

## 2021-08-27 RX ORDER — WARFARIN SODIUM 5 MG/1
TABLET ORAL
Qty: 15 TABLET | Refills: 0 | Status: SHIPPED | OUTPATIENT
Start: 2021-08-27 | End: 2021-09-16

## 2021-08-27 ASSESSMENT — ENCOUNTER SYMPTOMS
PALPITATIONS: 0
SORE THROAT: 0
EYE PAIN: 0
COUGH: 1
MYALGIAS: 0
ABDOMINAL PAIN: 0
NAUSEA: 0
PARESTHESIAS: 0
DIARRHEA: 0
DYSURIA: 0
CHILLS: 0
HEMATOCHEZIA: 0
JOINT SWELLING: 0
DIZZINESS: 0
ARTHRALGIAS: 0
FREQUENCY: 1
HEARTBURN: 0
WEAKNESS: 1
FEVER: 0
CONSTIPATION: 0
HEADACHES: 0
HEMATURIA: 0
NERVOUS/ANXIOUS: 1

## 2021-08-27 ASSESSMENT — ACTIVITIES OF DAILY LIVING (ADL)
CURRENT_FUNCTION: BATHING REQUIRES ASSISTANCE
CURRENT_FUNCTION: HOUSEWORK REQUIRES ASSISTANCE
CURRENT_FUNCTION: TELEPHONE REQUIRES ASSISTANCE
CURRENT_FUNCTION: SHOPPING REQUIRES ASSISTANCE
CURRENT_FUNCTION: MEDICATION ADMINISTRATION REQUIRES ASSISTANCE

## 2021-08-27 ASSESSMENT — PAIN SCALES - GENERAL: PAINLEVEL: NO PAIN (0)

## 2021-08-27 ASSESSMENT — MIFFLIN-ST. JEOR: SCORE: 1244.6

## 2021-08-27 NOTE — PROGRESS NOTES
"SUBJECTIVE:   Jamal Francis is a 89 year old male who presents for Preventive Visit.      Patient has been advised of split billing requirements and indicates understanding: Yes   Are you in the first 12 months of your Medicare coverage?  No    Healthy Habits:     In general, how would you rate your overall health?  Poor    Frequency of exercise:  None    Do you usually eat at least 4 servings of fruit and vegetables a day, include whole grains    & fiber and avoid regularly eating high fat or \"junk\" foods?  No    Taking medications regularly:  No    Barriers to taking medications:  Problems remembering to take them    Medication side effects:  None    Ability to successfully perform activities of daily living:  Telephone requires assistance, shopping requires assistance, housework requires assistance, bathing requires assistance and medication administration requires assistance    Home Safety:  No safety concerns identified    Hearing Impairment:  Difficult to understand a speaker at a public meeting or [employer] service, need to ask people to speak up or repeat themselves, difficulty understanding soft or whispered speech and difficulty understanding speech on the telephone    In the past 6 months, have you been bothered by leaking of urine?  No    In general, how would you rate your overall mental or emotional health?  Poor      PHQ-2 Total Score: 2    Additional concerns today:  No    Do you feel safe in your environment? Yes    Have you ever done Advance Care Planning? (For example, a Health Directive, POLST, or a discussion with a medical provider or your loved ones about your wishes): No, advance care planning information given to patient to review.  Patient plans to discuss their wishes with loved ones or provider.         Fall risk  Fallen 2 or more times in the past year?: No  Any fall with injury in the past year?: Yes  Timed Up and Go Test (>13.5 is fall risk; contact physician) : 10    Cognitive " Screening   1) Repeat 3 items (Leader, Season, Table)    2) Clock draw: ABNORMAL incorrect time  3) 3 item recall: Recalls NO objects   Results: 0 items recalled: PROBABLE COGNITIVE IMPAIRMENT, **INFORM PROVIDER**    Mini-CogTM Copyright ODETTE Guajardo. Licensed by the author for use in [facility]; reprinted with permission ([email or web address]). All rights reserved.      Do you have sleep apnea, excessive snoring or daytime drowsiness?: yes    Reviewed and updated as needed this visit by clinical staff  Tobacco  Allergies  Meds   Med Hx  Surg Hx  Fam Hx  Soc Hx        Reviewed and updated as needed this visit by Provider                Social History     Tobacco Use     Smoking status: Never Smoker     Smokeless tobacco: Never Used   Substance Use Topics     Alcohol use: Yes     Alcohol/week: 0.0 standard drinks     Comment: Moderately     If you drink alcohol do you typically have >3 drinks per day or >7 drinks per week? No    Alcohol Use 8/27/2021   Prescreen: >3 drinks/day or >7 drinks/week? No   Prescreen: >3 drinks/day or >7 drinks/week? -     Patient accompanied by his son. He is overdue for follow up, having been last seen in 2019. The patient's son says that he has been struggling with remembering to take his medication and that the bottle of diltiazem at home has been empty for some time. The patient was started on diltiazem to help with rate control of his chronic atrial fibrillation. He admits to feeling tired and dizzy on occasion. Denies chest pain/pressure, trouble breathing, abdominal upset, changes to appetite, lower extremity edema, PND, orthopnea.     Current providers sharing in care for this patient include:   Patient Care Team:  No Ref-Primary, Physician as PCP - Catarino Zeng DO as Assigned PCP    The following health maintenance items are reviewed in Epic and correct as of today:  Health Maintenance Due   Topic Date Due     ANNUAL REVIEW OF HM ORDERS  Never done  "    COPD ACTION PLAN  Never done     ZOSTER IMMUNIZATION (1 of 2) Never done     ADVANCE CARE PLANNING  10/04/2017     LIPID  08/30/2018     DTAP/TDAP/TD IMMUNIZATION (2 - Td or Tdap) 10/27/2018     MEDICARE ANNUAL WELLNESS VISIT  06/24/2020     FALL RISK ASSESSMENT  06/24/2020     INFLUENZA VACCINE (1) 09/01/2021     Lab work is in process          Review of Systems  Constitutional, HEENT, cardiovascular, pulmonary, gi and gu systems are negative, except as otherwise noted.    OBJECTIVE:   /80 (BP Location: Right arm, Patient Position: Chair, Cuff Size: Adult Large)   Pulse 80   Temp 97.4  F (36.3  C) (Temporal)   Resp 20   Ht 1.626 m (5' 4\")   Wt 66.9 kg (147 lb 6.4 oz)   SpO2 100%   BMI 25.30 kg/m   Estimated body mass index is 25.3 kg/m  as calculated from the following:    Height as of this encounter: 1.626 m (5' 4\").    Weight as of this encounter: 66.9 kg (147 lb 6.4 oz).  Physical Exam  GENERAL: healthy, alert and no distress  EYES: Eyes grossly normal to inspection, PERRL and conjunctivae and sclerae normal  HENT: ear canals and TM's normal, nose and mouth without ulcers or lesions  NECK: no adenopathy, no asymmetry, masses, or scars and thyroid normal to palpation  RESP: lungs clear to auscultation - no rales, rhonchi or wheezes  CV: regular rate and rhythm, normal S1 S2, no S3 or S4, no murmur, click or rub, no peripheral edema and peripheral pulses strong  ABDOMEN: soft, nontender, no hepatosplenomegaly, no masses and bowel sounds normal  MS: no gross musculoskeletal defects noted, no edema  NEURO: Normal strength and tone, mentation intact and speech normal  PSYCH: mentation appears normal, affect normal/bright    Diagnostic Test Results:  Results for orders placed or performed in visit on 08/27/21 (from the past 24 hour(s))   INR   Result Value Ref Range    INR 1.36 (H) 0.85 - 1.15   Lipid Profile (Chol, Trig, HDL, LDL calc)   Result Value Ref Range    Cholesterol 145 <200 mg/dL    " Triglycerides 40 <150 mg/dL    Direct Measure HDL 71 >=40 mg/dL    LDL Cholesterol Calculated 66 <=100 mg/dL    Non HDL Cholesterol 74 <130 mg/dL    Patient Fasting > 8hrs? No    CBC with platelets and differential    Narrative    The following orders were created for panel order CBC with platelets and differential.  Procedure                               Abnormality         Status                     ---------                               -----------         ------                     CBC with platelets and d...[[id number]]  Abnormal            Final result                 Please view results for these tests on the individual orders.   Comprehensive metabolic panel (BMP + Alb, Alk Phos, ALT, AST, Total. Bili, TP)   Result Value Ref Range    Sodium 136 133 - 144 mmol/L    Potassium 3.9 3.4 - 5.3 mmol/L    Chloride 108 94 - 109 mmol/L    Carbon Dioxide (CO2) 25 20 - 32 mmol/L    Anion Gap 3 3 - 14 mmol/L    Urea Nitrogen 16 7 - 30 mg/dL    Creatinine 0.95 0.66 - 1.25 mg/dL    Calcium 8.3 (L) 8.5 - 10.1 mg/dL    Glucose 96 70 - 99 mg/dL    Alkaline Phosphatase 110 40 - 150 U/L    AST 28 0 - 45 U/L    ALT 47 0 - 70 U/L    Protein Total 7.3 6.8 - 8.8 g/dL    Albumin 3.3 (L) 3.4 - 5.0 g/dL    Bilirubin Total 0.4 0.2 - 1.3 mg/dL    GFR Estimate 71 >60 mL/min/1.73m2   CBC with platelets and differential   Result Value Ref Range    WBC Count 6.5 4.0 - 11.0 10e3/uL    RBC Count 3.41 (L) 4.40 - 5.90 10e6/uL    Hemoglobin 10.7 (L) 13.3 - 17.7 g/dL    Hematocrit 33.0 (L) 40.0 - 53.0 %    MCV 97 78 - 100 fL    MCH 31.4 26.5 - 33.0 pg    MCHC 32.4 31.5 - 36.5 g/dL    RDW 14.3 10.0 - 15.0 %    Platelet Count 311 150 - 450 10e3/uL    % Neutrophils 66 %    % Lymphocytes 20 %    % Monocytes 10 %    % Eosinophils 3 %    % Basophils 1 %    % Immature Granulocytes 0 %    NRBCs per 100 WBC 0 <1 /100    Absolute Neutrophils 4.3 1.6 - 8.3 10e3/uL    Absolute Lymphocytes 1.3 0.8 - 5.3 10e3/uL    Absolute Monocytes 0.6 0.0 - 1.3 10e3/uL     "Absolute Eosinophils 0.2 0.0 - 0.7 10e3/uL    Absolute Basophils 0.1 0.0 - 0.2 10e3/uL    Absolute Immature Granulocytes 0.0 <=0.0 10e3/uL    Absolute NRBCs 0.0 10e3/uL       ASSESSMENT / PLAN:       ICD-10-CM    1. Encounter for preventive care  Z00.00 Lipid Profile (Chol, Trig, HDL, LDL calc)     CBC with platelets and differential     Comprehensive metabolic panel (BMP + Alb, Alk Phos, ALT, AST, Total. Bili, TP)     Lipid Profile (Chol, Trig, HDL, LDL calc)     CBC with platelets and differential     Comprehensive metabolic panel (BMP + Alb, Alk Phos, ALT, AST, Total. Bili, TP)   2. Memory loss  R41.3    3. Persistent atrial fibrillation (H)  I48.19 diltiazem (CARDIZEM) 30 MG tablet     warfarin ANTICOAGULANT (COUMADIN) 5 MG tablet   4. Pulmonary emphysema, unspecified emphysema type (H)  J43.9    5. Long term current use of anticoagulant therapy  Z79.01 INR     warfarin ANTICOAGULANT (COUMADIN) 5 MG tablet     INR       Patient has been advised of split billing requirements and indicates understanding: Yes  COUNSELING:  Reviewed preventive health counseling, as reflected in patient instructions       Regular exercise       Healthy diet/nutrition       Vision screening       Hearing screening       Dental care       Bladder control       Fall risk prevention    Estimated body mass index is 25.3 kg/m  as calculated from the following:    Height as of this encounter: 1.626 m (5' 4\").    Weight as of this encounter: 66.9 kg (147 lb 6.4 oz).    Weight management plan: Discussed healthy diet and exercise guidelines    He reports that he has never smoked. He has never used smokeless tobacco.      Appropriate preventive services were discussed with this patient, including applicable screening as appropriate for cardiovascular disease, diabetes, osteopenia/osteoporosis, and glaucoma.  As appropriate for age/gender, discussed screening for colorectal cancer, prostate cancer, breast cancer, and cervical cancer. Checklist " reviewing preventive services available has been given to the patient.    Reviewed patients plan of care and provided an AVS. The Basic Care Plan (routine screening as documented in Health Maintenance) for Jamal meets the Care Plan requirement. This Care Plan has been established and reviewed with the Patient and son.    Counseling Resources:  ATP IV Guidelines  Pooled Cohorts Equation Calculator  Breast Cancer Risk Calculator  Breast Cancer: Medication to Reduce Risk  FRAX Risk Assessment  ICSI Preventive Guidelines  Dietary Guidelines for Americans, 2010  [employer] MyPlate  ASA Prophylaxis  Lung CA Screening    ESTELLE Ruvalcaba [facility]    Identified Health Risks:

## 2021-08-27 NOTE — PATIENT INSTRUCTIONS
1. Update labs    2. Restart the diltiazem to help control potential sporadic episodes of afib    3. Family will call in the AM for medication reminders    4. If not effective can look more into home care

## 2021-08-27 NOTE — NURSING NOTE
Health Maintenance Due   Topic Date Due     ANNUAL REVIEW OF HM ORDERS  Never done     COPD ACTION PLAN  Never done     ZOSTER IMMUNIZATION (1 of 2) Never done     ADVANCE CARE PLANNING  10/04/2017     LIPID  08/30/2018     DTAP/TDAP/TD IMMUNIZATION (2 - Td or Tdap) 10/27/2018     MEDICARE ANNUAL WELLNESS VISIT  06/24/2020     FALL RISK ASSESSMENT  06/24/2020     PHQ-2  01/01/2021     INFLUENZA VACCINE (1) 09/01/2021     Robyn GUZMAN LPN

## 2021-08-27 NOTE — PROGRESS NOTES
ANTICOAGULATION MANAGEMENT     Jamal Francis 89 year old male is on warfarin with subtherapeutic INR result. (Goal INR 2.0-3.0)    Recent labs: (last 7 days)     08/27/21  1221   INR 1.36*       ASSESSMENT     Source(s): Chart Review and Patient/Caregiver Call       Warfarin doses taken: Missed dose(s) may be affecting INR    Diet: No new diet changes identified    New illness, injury, or hospitalization: No    Medication/supplement changes: None noted    Signs or symptoms of bleeding or clotting: No    Previous INR: Subtherapeutic    Additional findings: None     PLAN     Recommended plan for temporary change(s) affecting INR     Dosing Instructions: Continue your current warfarin dose with next INR in 1 week       Summary  As of 8/27/2021    Full warfarin instructions:  2.5 mg every Sat; 5 mg all other days   Next INR check:               Telephone call with  Son Demar - please now give orders to him. He will be calling Jamal daily to make sure he is taking his pills who verbalizes understanding and agrees to plan and who agrees to plan and repeated back plan correctly    Lab visit scheduled    Education provided: Please call back if any changes to your diet, medications or how you've been taking warfarin, Importance of consistent vitamin K intake, Goal range and significance of current result, Importance of therapeutic range, Importance of following up at instructed interval, Importance of taking warfarin as instructed, Warfarin tablet strength change; remove and/or discard previous strength from medication supply, Monitoring for bleeding signs and symptoms, Monitoring for clotting signs and symptoms and Importance of notifying clinic for changes in medications; a sooner lab recheck maybe needed.    Plan made per [facility] anticoagulation protocol    Marilee Feliciano RN  Anticoagulation Clinic  8/27/2021    _______________________________________________________________________     Anticoagulation Episode Summary      Current INR goal:  2.0-3.0   TTR:  20.9 % (7.6 mo)   Target end date:  Indefinite   Send INR reminders to:  LIZ PAEZ    Indications    Atrial fibrillation (H) [I48.91]  Long-term (current) use of anticoagulants [Z79.01] [Z79.01]  Atrial fibrillation  unspecified type (H) [I48.91]           Comments:  5 mg tablets, AM dose           Anticoagulation Care Providers     Provider Role Specialty Phone number    Catarino Landrum DO [facility] Internal Medicine [phone]

## 2021-08-29 DIAGNOSIS — D64.9 ANEMIA, UNSPECIFIED TYPE: Primary | ICD-10-CM

## 2021-09-03 ENCOUNTER — LAB (OUTPATIENT)
Dept: LAB | Facility: CLINIC | Age: 86
End: 2021-09-03
Payer: COMMERCIAL

## 2021-09-03 ENCOUNTER — ANTICOAGULATION THERAPY VISIT (OUTPATIENT)
Dept: ANTICOAGULATION | Facility: CLINIC | Age: 86
End: 2021-09-03

## 2021-09-03 DIAGNOSIS — I48.91 ATRIAL FIBRILLATION (H): Primary | ICD-10-CM

## 2021-09-03 DIAGNOSIS — Z79.01 LONG TERM CURRENT USE OF ANTICOAGULANT THERAPY: ICD-10-CM

## 2021-09-03 DIAGNOSIS — I48.91 ATRIAL FIBRILLATION, UNSPECIFIED TYPE (H): ICD-10-CM

## 2021-09-03 LAB — INR BLD: 2.2 (ref 0.9–1.1)

## 2021-09-03 PROCEDURE — 85610 PROTHROMBIN TIME: CPT

## 2021-09-03 PROCEDURE — 36416 COLLJ CAPILLARY BLOOD SPEC: CPT

## 2021-09-03 NOTE — PROGRESS NOTES
ANTICOAGULATION MANAGEMENT     Jamal Francis 89 year old male is on warfarin with therapeutic INR result. (Goal INR 2.0-3.0)    Recent labs: (last 7 days)     09/03/21  1155   INR 2.2*       ASSESSMENT     Source(s): Chart Review and Patient/Caregiver Call       Warfarin doses taken: Warfarin taken as instructed    Diet: No new diet changes identified    New illness, injury, or hospitalization: No    Medication/supplement changes: None noted    Signs or symptoms of bleeding or clotting: No    Previous INR: Subtherapeutic    Additional findings: None     PLAN     Recommended plan for no diet, medication or health factor changes affecting INR     Dosing Instructions: Continue your current warfarin dose with next INR in 2 weeks       Summary  As of 9/3/2021    Full warfarin instructions:  2.5 mg every Sat; 5 mg all other days   Next INR check:  9/17/2021             Telephone call with  Son Bernabe who verbalizes understanding and agrees to plan and who agrees to plan and repeated back plan correctly    Lab visit scheduled    Education provided: None required    Plan made per [facility] anticoagulation protocol    Miladys Bentley RN  Anticoagulation Clinic  9/3/2021    _______________________________________________________________________     Anticoagulation Episode Summary     Current INR goal:  2.0-3.0   TTR:  21.0 % (7.8 mo)   Target end date:  Indefinite   Send INR reminders to:  LIZ RG    Indications    Atrial fibrillation (H) [I48.91]  Long-term (current) use of anticoagulants [Z79.01] [Z79.01]  Atrial fibrillation  unspecified type (H) [I48.91]           Comments:  5 mg tablets, AM dose           Anticoagulation Care Providers     Provider Role Specialty Phone number    Catarino Landrum DO [facility] Internal Medicine [phone]

## 2021-09-16 ENCOUNTER — MYC REFILL (OUTPATIENT)
Dept: FAMILY MEDICINE | Facility: CLINIC | Age: 86
End: 2021-09-16

## 2021-09-16 DIAGNOSIS — I48.19 PERSISTENT ATRIAL FIBRILLATION (H): ICD-10-CM

## 2021-09-16 DIAGNOSIS — Z79.01 LONG TERM CURRENT USE OF ANTICOAGULANT THERAPY: ICD-10-CM

## 2021-09-17 ENCOUNTER — ANTICOAGULATION THERAPY VISIT (OUTPATIENT)
Dept: ANTICOAGULATION | Facility: CLINIC | Age: 86
End: 2021-09-17

## 2021-09-17 ENCOUNTER — LAB (OUTPATIENT)
Dept: LAB | Facility: CLINIC | Age: 86
End: 2021-09-17
Payer: COMMERCIAL

## 2021-09-17 DIAGNOSIS — Z79.01 LONG TERM CURRENT USE OF ANTICOAGULANT THERAPY: ICD-10-CM

## 2021-09-17 DIAGNOSIS — I48.91 ATRIAL FIBRILLATION, UNSPECIFIED TYPE (H): ICD-10-CM

## 2021-09-17 DIAGNOSIS — I48.91 ATRIAL FIBRILLATION (H): Primary | ICD-10-CM

## 2021-09-17 DIAGNOSIS — I48.91 ATRIAL FIBRILLATION (H): ICD-10-CM

## 2021-09-17 LAB — INR BLD: 2.3 (ref 0.9–1.1)

## 2021-09-17 PROCEDURE — 36416 COLLJ CAPILLARY BLOOD SPEC: CPT

## 2021-09-17 PROCEDURE — 85610 PROTHROMBIN TIME: CPT

## 2021-09-17 RX ORDER — WARFARIN SODIUM 5 MG/1
TABLET ORAL
Qty: 30 TABLET | Refills: 0 | Status: SHIPPED | OUTPATIENT
Start: 2021-09-17 | End: 2021-11-16

## 2021-09-17 NOTE — PROGRESS NOTES
Son Bernabe left a VM on the home care line returning a call at 4:13 pm.  Please call back.  Thank you.  Lotus Jimenez RN  Anticoagulation Nurse - [facility], [place]

## 2021-09-17 NOTE — PROGRESS NOTES
Anticoagulation Management    Unable to reach pt or son today.    Today's INR result of 2.3 is therapeutic (goal INR of 2.0-3.0).  Result received from: Clinic Lab    Follow up required to appt made for 10/15 at 1150.    No instructions provided. Unable to leave voicemail.      Anticoagulation clinic to follow up    I can be reached for return calls today at [phone] (internal staff only). Please do not give this number out to patients or cold transfer. Thank you!    Miladys Bentley RN

## 2021-09-17 NOTE — PROGRESS NOTES
ANTICOAGULATION MANAGEMENT     Jamal Francis 89 year old male is on warfarin with therapeutic INR result. (Goal INR 2.0-3.0)    Recent labs: (last 7 days)     09/17/21  1146   INR 2.3*       ASSESSMENT     Source(s): Chart Review and Patient/Caregiver Call       Warfarin doses taken: Warfarin taken as instructed    Diet: No new diet changes identified    New illness, injury, or hospitalization: No    Medication/supplement changes: None noted    Signs or symptoms of bleeding or clotting: No    Previous INR: Therapeutic last visit; previously outside of goal range    Additional findings: None     PLAN     Recommended plan for no diet, medication or health factor changes affecting INR     Dosing Instructions: Continue your current warfarin dose with next INR in 4 weeks       Summary  As of 9/17/2021    Full warfarin instructions:  2.5 mg every Sat; 5 mg all other days   Next INR check:  10/15/2021             Telephone call with  Son Bernabe who verbalizes understanding and agrees to plan and who agrees to plan and repeated back plan correctly    Lab visit scheduled    Education provided: None required    Plan made per [facility] anticoagulation protocol    Miladys Bentley RN  Anticoagulation Clinic  9/17/2021    _______________________________________________________________________     Anticoagulation Episode Summary     Current INR goal:  2.0-3.0   TTR:  25.5 % (8.3 mo)   Target end date:  Indefinite   Send INR reminders to:  LIZ RG    Indications    Atrial fibrillation (H) [I48.91]  Long-term (current) use of anticoagulants [Z79.01] [Z79.01]  Atrial fibrillation  unspecified type (H) [I48.91]           Comments:  5 mg tablets, AM dose           Anticoagulation Care Providers     Provider Role Specialty Phone number    Catarino Landrum DO Referring Internal Medicine [phone]    Vitor Alvarenga PA-C Responsible Internal Medicine [phone]

## 2021-09-20 ENCOUNTER — ANTICOAGULATION THERAPY VISIT (OUTPATIENT)
Dept: ANTICOAGULATION | Facility: CLINIC | Age: 86
End: 2021-09-20

## 2021-09-20 ENCOUNTER — LAB (OUTPATIENT)
Dept: LAB | Facility: CLINIC | Age: 86
End: 2021-09-20
Payer: COMMERCIAL

## 2021-09-20 DIAGNOSIS — I48.91 ATRIAL FIBRILLATION, UNSPECIFIED TYPE (H): ICD-10-CM

## 2021-09-20 DIAGNOSIS — D64.9 ANEMIA, UNSPECIFIED TYPE: ICD-10-CM

## 2021-09-20 DIAGNOSIS — I48.91 ATRIAL FIBRILLATION (H): Primary | ICD-10-CM

## 2021-09-20 DIAGNOSIS — Z79.01 LONG TERM CURRENT USE OF ANTICOAGULANT THERAPY: Primary | ICD-10-CM

## 2021-09-20 DIAGNOSIS — Z79.01 LONG TERM CURRENT USE OF ANTICOAGULANT THERAPY: ICD-10-CM

## 2021-09-20 LAB
FERRITIN SERPL-MCNC: 97 NG/ML (ref 26–388)
FOLATE SERPL-MCNC: 25.6 NG/ML
INR PPP: 1.54 (ref 0.85–1.15)
IRON SATN MFR SERPL: 21 % (ref 15–46)
IRON SERPL-MCNC: 63 UG/DL (ref 35–180)
TIBC SERPL-MCNC: 294 UG/DL (ref 240–430)
VIT B12 SERPL-MCNC: 261 PG/ML (ref 193–986)

## 2021-09-20 PROCEDURE — 36415 COLL VENOUS BLD VENIPUNCTURE: CPT

## 2021-09-20 PROCEDURE — 82746 ASSAY OF FOLIC ACID SERUM: CPT

## 2021-09-20 PROCEDURE — 85610 PROTHROMBIN TIME: CPT

## 2021-09-20 PROCEDURE — 82728 ASSAY OF FERRITIN: CPT

## 2021-09-20 PROCEDURE — 82607 VITAMIN B-12: CPT

## 2021-09-20 PROCEDURE — 83550 IRON BINDING TEST: CPT

## 2021-09-20 NOTE — PROGRESS NOTES
ANTICOAGULATION MANAGEMENT     Jamal Francis 89 year old male is on warfarin with subtherapeutic INR result. (Goal INR 2.0-3.0)    Recent labs: (last 7 days)     09/20/21  1208   INR 1.54*       ASSESSMENT     Source(s): Chart Review and Patient/Caregiver Call       Warfarin doses taken: Warfarin taken as instructed    Diet: No new diet changes identified    New illness, injury, or hospitalization: No    Medication/supplement changes: None noted    Signs or symptoms of bleeding or clotting: No    Previous INR: Therapeutic last visit; previously outside of goal range    Additional findings: Pt just in on friday with therapeutic INR, no changes that son knows. INR was done with other labs today     PLAN     Recommended plan for no diet, medication or health factor changes affecting INR     Dosing Instructions: Booster dose then continue your current warfarin dose with next INR in 1 week       Summary  As of 9/20/2021    Full warfarin instructions:  9/21: 7.5 mg; Otherwise 2.5 mg every Sat; 5 mg all other days   Next INR check:  9/27/2021             Telephone call with  Son Bernabe who verbalizes understanding and agrees to plan and who agrees to plan and repeated back plan correctly    Lab visit scheduled    Education provided: None required    Plan made per [facility] anticoagulation protocol    Miladys Bentley RN  Anticoagulation Clinic  9/20/2021    _______________________________________________________________________     Anticoagulation Episode Summary     Current INR goal:  2.0-3.0   TTR:  25.6 % (8.4 mo)   Target end date:  Indefinite   Send INR reminders to:  LIZ RG    Indications    Atrial fibrillation (H) [I48.91]  Long-term (current) use of anticoagulants [Z79.01] [Z79.01]  Atrial fibrillation  unspecified type (H) [I48.91]           Comments:  5 mg tablets, AM dose           Anticoagulation Care Providers     Provider Role Specialty Phone number    Catarino Landrum DO Referring Internal  Medicine [phone]    Vitor Alvarenga PA-C [facility] Internal Medicine [phone]

## 2021-09-21 ENCOUNTER — MYC MEDICAL ADVICE (OUTPATIENT)
Dept: FAMILY MEDICINE | Facility: CLINIC | Age: 86
End: 2021-09-21
Payer: COMMERCIAL

## 2021-09-21 DIAGNOSIS — D64.9 ANEMIA, UNSPECIFIED TYPE: Primary | ICD-10-CM

## 2021-09-21 DIAGNOSIS — D64.9 ANEMIA, UNSPECIFIED TYPE: ICD-10-CM

## 2021-09-24 LAB — HEMOCCULT STL QL IA: NEGATIVE

## 2021-09-24 PROCEDURE — 82274 ASSAY TEST FOR BLOOD FECAL: CPT | Performed by: PHYSICIAN ASSISTANT

## 2021-09-27 ENCOUNTER — LAB (OUTPATIENT)
Dept: LAB | Facility: CLINIC | Age: 86
End: 2021-09-27
Payer: COMMERCIAL

## 2021-09-27 ENCOUNTER — ANTICOAGULATION THERAPY VISIT (OUTPATIENT)
Dept: ANTICOAGULATION | Facility: CLINIC | Age: 86
End: 2021-09-27

## 2021-09-27 DIAGNOSIS — I48.91 ATRIAL FIBRILLATION (H): ICD-10-CM

## 2021-09-27 DIAGNOSIS — I48.91 ATRIAL FIBRILLATION, UNSPECIFIED TYPE (H): ICD-10-CM

## 2021-09-27 DIAGNOSIS — I48.91 ATRIAL FIBRILLATION (H): Primary | ICD-10-CM

## 2021-09-27 DIAGNOSIS — Z79.01 LONG TERM CURRENT USE OF ANTICOAGULANT THERAPY: ICD-10-CM

## 2021-09-27 LAB — INR BLD: 1.2 (ref 0.9–1.1)

## 2021-09-27 PROCEDURE — 85610 PROTHROMBIN TIME: CPT

## 2021-09-27 PROCEDURE — 36416 COLLJ CAPILLARY BLOOD SPEC: CPT

## 2021-09-27 NOTE — PROGRESS NOTES
ANTICOAGULATION MANAGEMENT     Jamal Francis 89 year old male is on warfarin with subtherapeutic INR result. (Goal INR 2.0-3.0)    Recent labs: (last 7 days)     09/27/21  1200   INR 1.2*       ASSESSMENT     Source(s): Chart Review and Patient/Caregiver Call       Warfarin doses taken: Warfarin taken as instructed    Diet: Protein supplement/shake increased which maybe affecting INR    New illness, injury, or hospitalization: No    Medication/supplement changes: None noted    Signs or symptoms of bleeding or clotting: No    Previous INR: Subtherapeutic    Additional findings: None     PLAN     Recommended plan for ongoing change(s) affecting INR     Dosing Instructions: Booster dose then Increase your warfarin dose (7.7% change) with next INR in 1 week       Summary  As of 9/27/2021    Full warfarin instructions:  9/28: 7.5 mg; Otherwise 5 mg every day   Next INR check:  10/4/2021             Telephone call with  Son Demar who verbalizes understanding and agrees to plan and who agrees to plan and repeated back plan correctly    Lab visit scheduled    Education provided: Please call back if any changes to your diet, medications or how you've been taking warfarin, Importance of consistent vitamin K intake, Goal range and significance of current result and Importance of therapeutic range    Plan made per [facility] anticoagulation protocol    Marilee Feliciano RN  Anticoagulation Clinic  9/27/2021    _______________________________________________________________________     Anticoagulation Episode Summary     Current INR goal:  2.0-3.0   TTR:  24.9 % (8.6 mo)   Target end date:  Indefinite   Send INR reminders to:  LIZ RG    Indications    Atrial fibrillation (H) [I48.91]  Long-term (current) use of anticoagulants [Z79.01] [Z79.01]  Atrial fibrillation  unspecified type (H) [I48.91]           Comments:  5 mg tablets, AM dose           Anticoagulation Care Providers     Provider Role Specialty Phone number     Catarino Landrum DO [facility] Internal Medicine [phone]    Vitor Alvarenga PA-C [facility] Internal Medicine [phone]

## 2021-10-01 ENCOUNTER — MYC MEDICAL ADVICE (OUTPATIENT)
Dept: FAMILY MEDICINE | Facility: CLINIC | Age: 86
End: 2021-10-01

## 2021-10-04 ENCOUNTER — ANTICOAGULATION THERAPY VISIT (OUTPATIENT)
Dept: ANTICOAGULATION | Facility: CLINIC | Age: 86
End: 2021-10-04

## 2021-10-04 ENCOUNTER — LAB (OUTPATIENT)
Dept: LAB | Facility: CLINIC | Age: 86
End: 2021-10-04
Payer: COMMERCIAL

## 2021-10-04 DIAGNOSIS — Z79.01 LONG TERM CURRENT USE OF ANTICOAGULANT THERAPY: ICD-10-CM

## 2021-10-04 DIAGNOSIS — I48.19 PERSISTENT ATRIAL FIBRILLATION (H): ICD-10-CM

## 2021-10-04 DIAGNOSIS — I48.91 ATRIAL FIBRILLATION, UNSPECIFIED TYPE (H): ICD-10-CM

## 2021-10-04 DIAGNOSIS — I48.91 ATRIAL FIBRILLATION (H): ICD-10-CM

## 2021-10-04 DIAGNOSIS — I48.91 ATRIAL FIBRILLATION (H): Primary | ICD-10-CM

## 2021-10-04 LAB — INR BLD: 1.9 (ref 0.9–1.1)

## 2021-10-04 PROCEDURE — 36416 COLLJ CAPILLARY BLOOD SPEC: CPT

## 2021-10-04 PROCEDURE — 85610 PROTHROMBIN TIME: CPT

## 2021-10-04 NOTE — PROGRESS NOTES
ANTICOAGULATION MANAGEMENT     Jamal Francis 89 year old male is on warfarin with subtherapeutic INR result. (Goal INR 2.0-3.0)    Recent labs: (last 7 days)     10/04/21  1218   INR 1.9*       ASSESSMENT     Source(s): Chart Review and Patient/Caregiver Call       Warfarin doses taken: Warfarin taken as instructed    Diet: No new diet changes identified    New illness, injury, or hospitalization: No    Medication/supplement changes: None noted    Signs or symptoms of bleeding or clotting: No    Previous INR: Subtherapeutic    Additional findings: None     PLAN     Recommended plan for no diet, medication or health factor changes affecting INR     Dosing Instructions:  Increase your warfarin dose (14.3% change) with next INR in 2 weeks       Summary  As of 10/4/2021    Full warfarin instructions:  7.5 mg every Tue, Fri; 5 mg all other days   Next INR check:  10/15/2021             Telephone call with  Son Demar who verbalizes understanding and agrees to plan and who agrees to plan and repeated back plan correctly    Lab visit scheduled    Education provided: Please call back if any changes to your diet, medications or how you've been taking warfarin, Goal range and significance of current result and Importance of therapeutic range    Plan made per [facility] anticoagulation protocol    Marilee Feliciano, RN  Anticoagulation Clinic  10/4/2021    _______________________________________________________________________     Anticoagulation Episode Summary     Current INR goal:  2.0-3.0   TTR:  24.3 % (8.9 mo)   Target end date:  Indefinite   [facility]    Indications    Atrial fibrillation (H) [I48.91]  Long-term (current) use of anticoagulants [Z79.01] [Z79.01]  Atrial fibrillation  unspecified type (H) [I48.91]           Comments:  5 mg tablets, AM dose           Anticoagulation Care Providers     Provider Role Specialty Phone number    Catarino Landrum DO Referring Internal Medicine  [phone]    Vitor Alvarenga PA-C [facility] Internal Medicine [phone]

## 2021-10-06 RX ORDER — DILTIAZEM HYDROCHLORIDE 30 MG/1
15 TABLET, FILM COATED ORAL DAILY
Qty: 45 TABLET | Refills: 3 | Status: SHIPPED | OUTPATIENT
Start: 2021-10-06 | End: 2021-10-08

## 2021-10-08 ENCOUNTER — OFFICE VISIT (OUTPATIENT)
Dept: FAMILY MEDICINE | Facility: CLINIC | Age: 86
End: 2021-10-08
Payer: COMMERCIAL

## 2021-10-08 VITALS
WEIGHT: 149.8 LBS | SYSTOLIC BLOOD PRESSURE: 148 MMHG | BODY MASS INDEX: 25.71 KG/M2 | TEMPERATURE: 97.5 F | OXYGEN SATURATION: 97 % | DIASTOLIC BLOOD PRESSURE: 88 MMHG | HEART RATE: 76 BPM | RESPIRATION RATE: 20 BRPM

## 2021-10-08 DIAGNOSIS — F43.9 STRESS: ICD-10-CM

## 2021-10-08 DIAGNOSIS — F41.9 ANXIETY: ICD-10-CM

## 2021-10-08 DIAGNOSIS — I48.19 PERSISTENT ATRIAL FIBRILLATION (H): ICD-10-CM

## 2021-10-08 DIAGNOSIS — I10 ESSENTIAL HYPERTENSION, BENIGN: Primary | ICD-10-CM

## 2021-10-08 PROCEDURE — 99214 OFFICE O/P EST MOD 30 MIN: CPT | Performed by: PHYSICIAN ASSISTANT

## 2021-10-08 RX ORDER — GABAPENTIN 100 MG/1
100 CAPSULE ORAL 2 TIMES DAILY PRN
Qty: 60 CAPSULE | Refills: 0 | Status: SHIPPED | OUTPATIENT
Start: 2021-10-08 | End: 2022-03-24

## 2021-10-08 RX ORDER — DILTIAZEM HYDROCHLORIDE 30 MG/1
30 TABLET, FILM COATED ORAL DAILY
Qty: 90 TABLET | Refills: 1 | Status: SHIPPED | OUTPATIENT
Start: 2021-10-08 | End: 2022-04-05

## 2021-10-08 ASSESSMENT — PAIN SCALES - GENERAL: PAINLEVEL: NO PAIN (0)

## 2021-10-08 NOTE — PROGRESS NOTES
Assessment & Plan     Essential hypertension, benign  Anxiety  Persistent atrial fibrillation (H)  Stress  See HPI for full details. Patient will continue full tablet of diltiazem daily and will monitor for improvement of BP over the next 1-2 weeks as the large stressors of the upcoming move resolve. If pressures remain high consider addition of lisinopril or similar ACEI.   - gabapentin (NEURONTIN) 100 MG capsule; Take 1 capsule (100 mg) by mouth 2 times daily as needed (nerves)  - Home Blood Pressure Monitor Order for DME - ONLY FOR DME  - diltiazem (CARDIZEM) 30 MG tablet; Take 1 tablet (30 mg) by mouth daily    Return in about 10 months (around 8/8/2022) for Return for scheduled annual checkup with PCP.    ESTELLE [facility]    Megha Berry is a 89 year old who presents for the following health issues   Hospitals in [place]     Hypertension Follow-up      Do you check your blood pressure regularly outside of the clinic? No     Are you following a low salt diet? No    Are your blood pressures ever more than 140 on the top number (systolic) OR more   than 90 on the bottom number (diastolic), for example 140/90? Yes      How many servings of fruits and vegetables do you eat daily?  0-1    On average, how many sweetened beverages do you drink each day (Examples: soda, juice, sweet tea, etc.  Do NOT count diet or artificially sweetened beverages)?   0    How many days per week do you exercise enough to make your heart beat faster? none    How many minutes a day do you exercise enough to make your heart beat faster? none    How many days per week do you miss taking your medication? 0    Patient is an 89 year old male who is brought in by his son to discuss concerns about elevated BP. The patient has been under increased stress as his spouse lives in [place] home and will be moving into [facility] on Monday. 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 Son, who accompanied him, says that this has been stressful and that the patient ha been reporting episodes of \"nerves\". Son says that the patient has been trying to treat with OTC analgesic such as tylenol. He denies pain during nerves, says he feels off and notices that these are worse if he does not sleep well the night before. BP is elevated today at 148/88. Son says that this has been occurring more as the move has been approaching. Patient informs me he has been taking increased diltiazem, 1 tab vs 1/2 tab, and tolerating well. As this appears to be influenced by stress I advised close monitoring at home over next 1-2 weeks and consider ACE-I if not improving with the move.     Review of Systems   Constitutional, HEENT, cardiovascular, pulmonary, gi and gu systems are negative, except as otherwise noted.      Objective    BP (!) 148/88 (BP Location: Right arm, Patient Position: Chair, Cuff Size: Adult Regular)   Pulse 76   Temp 97.5  F (36.4  C) (Temporal)   Resp 20   Wt 67.9 kg (149 lb 12.8 oz)   SpO2 97%   BMI 25.71 kg/m    Body mass index is 25.71 kg/m .  Physical Exam   GENERAL: healthy, alert and no distress  EYES: Eyes grossly normal to inspection, PERRL and conjunctivae and sclerae normal  HENT: ear canals and TM's normal, nose and mouth without ulcers or lesions  NECK: no adenopathy, no asymmetry, masses, or scars and thyroid normal to palpation  RESP: lungs clear to auscultation - no rales, rhonchi or wheezes  CV: regular rate and rhythm, normal S1 S2, no S3 or S4, no murmur, click or rub, no peripheral edema and peripheral pulses strong  ABDOMEN: soft, nontender, no hepatosplenomegaly, no masses and bowel sounds normal  MS: no gross musculoskeletal defects noted, no edema  NEURO: Normal strength and tone, mentation intact and speech normal  PSYCH: mentation appears normal, affect normal/bright        "

## 2021-10-08 NOTE — PATIENT INSTRUCTIONS
Plan:    1. Gabapentin 100mg twice daily as needed for stress   - if not tolerating call and well find an alternative    2. Monitor BP for next 2 weeks.    - stagger times checked if able     3. If BP remains high, reach out to me and we can potentially start a low dose ace inhibitor     4. High levels of stress due to upcoming move and being away from spouse

## 2021-10-08 NOTE — NURSING NOTE
Health Maintenance Due   Topic Date Due     ANNUAL REVIEW OF HM ORDERS  Never done     COPD ACTION PLAN  Never done     ZOSTER IMMUNIZATION (1 of 2) Never done     DTAP/TDAP/TD IMMUNIZATION (2 - Td or Tdap) 10/27/2018     INFLUENZA VACCINE (1) 09/01/2021     Robyn GUZMAN LPN

## 2021-10-18 ENCOUNTER — LAB (OUTPATIENT)
Dept: LAB | Facility: CLINIC | Age: 86
End: 2021-10-18
Payer: COMMERCIAL

## 2021-10-18 ENCOUNTER — ANTICOAGULATION THERAPY VISIT (OUTPATIENT)
Dept: ANTICOAGULATION | Facility: CLINIC | Age: 86
End: 2021-10-18

## 2021-10-18 DIAGNOSIS — I48.91 ATRIAL FIBRILLATION (H): Primary | ICD-10-CM

## 2021-10-18 DIAGNOSIS — I48.91 ATRIAL FIBRILLATION, UNSPECIFIED TYPE (H): ICD-10-CM

## 2021-10-18 DIAGNOSIS — I48.91 ATRIAL FIBRILLATION (H): ICD-10-CM

## 2021-10-18 DIAGNOSIS — Z79.01 LONG TERM CURRENT USE OF ANTICOAGULANT THERAPY: ICD-10-CM

## 2021-10-18 LAB — INR BLD: 4.9 (ref 0.9–1.1)

## 2021-10-18 PROCEDURE — 85610 PROTHROMBIN TIME: CPT

## 2021-10-18 PROCEDURE — 36416 COLLJ CAPILLARY BLOOD SPEC: CPT

## 2021-10-18 NOTE — PROGRESS NOTES
ANTICOAGULATION MANAGEMENT     Jamal Francis 89 year old male is on warfarin with supratherapeutic INR result. (Goal INR 2.0-3.0)    Recent labs: (last 7 days)     10/18/21  1109   INR 4.9*       ASSESSMENT     Source(s): Patient/Caregiver Call - SonDemar       Warfarin doses taken: Warfarin taken as instructed    Diet: pt is now living at [facility], so diet will be more consistent, per son    New illness, injury, or hospitalization: No    Medication/supplement changes: None noted    Signs or symptoms of bleeding or clotting: No    Previous INR: Subtherapeutic    Additional findings: None     PLAN     Recommended plan for ongoing change(s) affecting INR     Dosing Instructions: hold x2, then resume  with next INR in 1 week       Summary  As of 10/18/2021    Full warfarin instructions:  10/18: Hold; 10/19: Hold; Otherwise 7.5 mg every Tue, Fri; 5 mg all other days   Next INR check:  10/25/2021             Telephone call with  son who verbalizes understanding and agrees to plan    Lab visit scheduled    Education provided: Monitoring for bleeding signs and symptoms    Plan made per [facility] anticoagulation protocol    Livia Cassidy, RN  Anticoagulation Clinic  10/18/2021    _______________________________________________________________________     Anticoagulation Episode Summary     Current INR goal:  2.0-3.0   TTR:  24.7 % (9.3 mo)   Target end date:  Indefinite   Send INR reminders to:  [facility]    Indications    Atrial fibrillation (H) [I48.91]  Long-term (current) use of anticoagulants [Z79.01] [Z79.01]  Atrial fibrillation  unspecified type (H) [I48.91]           Comments:  5 mg tablets, AM dose           Anticoagulation Care Providers     Provider Role Specialty Phone number    Catarino Landrum DO Referring Internal Medicine [phone]    Vitor Alvarenga PA-C Responsible Internal Medicine [phone]

## 2021-10-20 ENCOUNTER — MYC MEDICAL ADVICE (OUTPATIENT)
Dept: FAMILY MEDICINE | Facility: CLINIC | Age: 86
End: 2021-10-20

## 2021-10-23 ENCOUNTER — HEALTH MAINTENANCE LETTER (OUTPATIENT)
Age: 86
End: 2021-10-23

## 2021-10-25 ENCOUNTER — LAB (OUTPATIENT)
Dept: LAB | Facility: CLINIC | Age: 86
End: 2021-10-25
Payer: COMMERCIAL

## 2021-10-25 ENCOUNTER — ANTICOAGULATION THERAPY VISIT (OUTPATIENT)
Dept: ANTICOAGULATION | Facility: CLINIC | Age: 86
End: 2021-10-25

## 2021-10-25 DIAGNOSIS — Z79.01 LONG TERM CURRENT USE OF ANTICOAGULANT THERAPY: ICD-10-CM

## 2021-10-25 DIAGNOSIS — I48.91 ATRIAL FIBRILLATION (H): ICD-10-CM

## 2021-10-25 DIAGNOSIS — I48.91 ATRIAL FIBRILLATION (H): Primary | ICD-10-CM

## 2021-10-25 DIAGNOSIS — I48.91 ATRIAL FIBRILLATION, UNSPECIFIED TYPE (H): ICD-10-CM

## 2021-10-25 LAB — INR BLD: 1.3 (ref 0.9–1.1)

## 2021-10-25 PROCEDURE — 85610 PROTHROMBIN TIME: CPT

## 2021-10-25 PROCEDURE — 36416 COLLJ CAPILLARY BLOOD SPEC: CPT

## 2021-11-09 ENCOUNTER — ANTICOAGULATION THERAPY VISIT (OUTPATIENT)
Dept: ANTICOAGULATION | Facility: CLINIC | Age: 86
End: 2021-11-09

## 2021-11-09 ENCOUNTER — LAB (OUTPATIENT)
Dept: LAB | Facility: CLINIC | Age: 86
End: 2021-11-09
Payer: COMMERCIAL

## 2021-11-09 DIAGNOSIS — I48.91 ATRIAL FIBRILLATION (H): ICD-10-CM

## 2021-11-09 DIAGNOSIS — I48.91 ATRIAL FIBRILLATION, UNSPECIFIED TYPE (H): Primary | ICD-10-CM

## 2021-11-09 DIAGNOSIS — Z79.01 LONG TERM CURRENT USE OF ANTICOAGULANT THERAPY: ICD-10-CM

## 2021-11-09 DIAGNOSIS — I48.91 ATRIAL FIBRILLATION, UNSPECIFIED TYPE (H): ICD-10-CM

## 2021-11-09 LAB — INR BLD: 1.5 (ref 0.9–1.1)

## 2021-11-09 PROCEDURE — 85610 PROTHROMBIN TIME: CPT

## 2021-11-09 PROCEDURE — 36416 COLLJ CAPILLARY BLOOD SPEC: CPT

## 2021-11-09 NOTE — PROGRESS NOTES
Anticoagulation Management     Unable to reach pt today.     Today's INR result of 1.5 is subtherapeutic (goal INR of 2.0-3.0).  Result received from: Clinic Lab     Follow up required to confirm warfarin dose taken and assess for changes     Attempted to contact son, Demar, but unable to leave a VM. Will need to try again later. '        Anticoagulation clinic to follow up     Livia Cassidy RN

## 2021-11-09 NOTE — PROGRESS NOTES
ANTICOAGULATION MANAGEMENT     Jamal Francis 89 year old male is on warfarin with subtherapeutic INR result. (Goal INR 2.0-3.0)    Recent labs: (last 7 days)     11/09/21  1144   INR 1.5*       ASSESSMENT     Source(s): Chart Review and Patient/Caregiver Call       Warfarin doses taken: Warfarin taken as instructed    Diet: asked about ensure, son with check with [facility] to see if he's getting ensure with his meals    New illness, injury, or hospitalization: No    Medication/supplement changes: None noted    Signs or symptoms of bleeding or clotting: No    Previous INR: Subtherapeutic    Additional findings: None     PLAN     Recommended plan for no diet, medication or health factor changes affecting INR     Dosing Instructions: Booster dose then Increase your warfarin dose (6.7% change) with next INR in 6 days       Summary  As of 11/9/2021    Full warfarin instructions:  11/10: 10 mg; Otherwise 7.5 mg every Tue, Sat; 5 mg all other days   Next INR check:  11/15/2021             Telephone call with  Son Bernabe who verbalizes understanding and agrees to plan    Lab visit scheduled    Education provided: Importance of consistent vitamin K intake and Monitoring for clotting signs and symptoms    Plan made per [facility] anticoagulation protocol    Miladys Bentley RN  Anticoagulation Clinic  11/9/2021    _______________________________________________________________________     Anticoagulation Episode Summary     Current INR goal:  2.0-3.0   TTR:  23.6 % (10.1 mo)   Target end date:  Indefinite   [facility]    Indications    Atrial fibrillation (H) [I48.91]  Long-term (current) use of anticoagulants [Z79.01] [Z79.01]  Atrial fibrillation  unspecified type (H) [I48.91]           Comments:  5 mg tablets, AM dose           Anticoagulation Care Providers     Provider Role Specialty Phone number    Catarino Landrum DO [facility] Internal Medicine [phone]    Vitor Alvarenga,  ESTELLE [facility] Internal Medicine [phone]

## 2021-11-12 DIAGNOSIS — I48.19 PERSISTENT ATRIAL FIBRILLATION (H): ICD-10-CM

## 2021-11-12 DIAGNOSIS — Z79.01 LONG TERM CURRENT USE OF ANTICOAGULANT THERAPY: ICD-10-CM

## 2021-11-15 ENCOUNTER — ANTICOAGULATION THERAPY VISIT (OUTPATIENT)
Dept: ANTICOAGULATION | Facility: CLINIC | Age: 86
End: 2021-11-15

## 2021-11-15 ENCOUNTER — LAB (OUTPATIENT)
Dept: LAB | Facility: CLINIC | Age: 86
End: 2021-11-15
Payer: COMMERCIAL

## 2021-11-15 DIAGNOSIS — I48.91 ATRIAL FIBRILLATION, UNSPECIFIED TYPE (H): ICD-10-CM

## 2021-11-15 DIAGNOSIS — I48.91 ATRIAL FIBRILLATION, UNSPECIFIED TYPE (H): Primary | ICD-10-CM

## 2021-11-15 DIAGNOSIS — Z79.01 LONG TERM CURRENT USE OF ANTICOAGULANT THERAPY: ICD-10-CM

## 2021-11-15 DIAGNOSIS — I48.91 ATRIAL FIBRILLATION (H): ICD-10-CM

## 2021-11-15 LAB — INR BLD: 1.2 (ref 0.9–1.1)

## 2021-11-15 PROCEDURE — 36416 COLLJ CAPILLARY BLOOD SPEC: CPT

## 2021-11-15 PROCEDURE — 85610 PROTHROMBIN TIME: CPT

## 2021-11-15 NOTE — TELEPHONE ENCOUNTER
Coumadin Routing refill request to provider for review/approval because:  Labs out of range:  NOT within goal of 2 to 3 for A. Fib.   BRIANA Forrester

## 2021-11-15 NOTE — PROGRESS NOTES
ANTICOAGULATION MANAGEMENT     Jamal Francis 89 year old male is on warfarin with subtherapeutic INR result. (Goal INR 2.0-3.0)    Recent labs: (last 7 days)     11/15/21  1141   INR 1.2*       ASSESSMENT     Source(s): Chart Review and Patient/Caregiver Call       Warfarin doses taken: Warfarin taken as instructed    Diet: Drinking ensure    New illness, injury, or hospitalization: No    Medication/supplement changes: None noted    Signs or symptoms of bleeding or clotting: No    Previous INR: Subtherapeutic    Additional findings: None     PLAN     Recommended plan for ongoing change(s) affecting INR     Dosing Instructions: Booster dose then Increase your warfarin dose (18.8% change) with next INR in 1 week       Summary  As of 11/15/2021    Full warfarin instructions:  11/15: 10 mg; Otherwise 5 mg every Thu, Sat; 7.5 mg all other days   Next INR check:  11/22/2021             Telephone call with  Son, james message sent with dosing who verbalizes understanding and agrees to plan    Lab visit scheduled    Education provided: Importance of consistent vitamin K intake and Monitoring for clotting signs and symptoms    Plan made with [facility] Pharmacist Melinda Bentley, BRIANA  Anticoagulation Clinic  11/15/2021    _______________________________________________________________________     Anticoagulation Episode Summary     Current INR goal:  2.0-3.0   TTR:  23.1 % (10.3 mo)   Target end date:  Indefinite   Send INR reminders to:  [facility]    Indications    Atrial fibrillation (H) [I48.91]  Long-term (current) use of anticoagulants [Z79.01] [Z79.01]  Atrial fibrillation  unspecified type (H) [I48.91]           Comments:  5 mg tablets, AM dose           Anticoagulation Care Providers     Provider Role Specialty Phone number    Catarino Landrum DO Referring Internal Medicine [phone]    Vitor Alvarenga PA-C Responsible Internal Medicine [phone]

## 2021-11-15 NOTE — PROGRESS NOTES
Anticoagulation Management    Unable to reach pt;s sonDemar, today.    Today's INR result of 1.2 is subtherapeutic (goal INR of 2.0-3.0).  Result received from: Clinic Lab    Follow up required to confirm warfarin dose taken and assess for changes    Attempted to call Demar hermosillo, no answer. Unable to leave a VM. Will need to try again late.r       Anticoagulation clinic to follow up    Livia Cassidy RN

## 2021-11-16 RX ORDER — WARFARIN SODIUM 5 MG/1
TABLET ORAL
Qty: 30 TABLET | Refills: 0 | Status: SHIPPED | OUTPATIENT
Start: 2021-11-16 | End: 2021-11-30

## 2021-11-22 ENCOUNTER — LAB (OUTPATIENT)
Dept: LAB | Facility: CLINIC | Age: 86
End: 2021-11-22
Payer: COMMERCIAL

## 2021-11-22 ENCOUNTER — ANTICOAGULATION THERAPY VISIT (OUTPATIENT)
Dept: ANTICOAGULATION | Facility: CLINIC | Age: 86
End: 2021-11-22

## 2021-11-22 DIAGNOSIS — Z79.01 LONG TERM CURRENT USE OF ANTICOAGULANT THERAPY: ICD-10-CM

## 2021-11-22 DIAGNOSIS — I48.91 ATRIAL FIBRILLATION, UNSPECIFIED TYPE (H): ICD-10-CM

## 2021-11-22 DIAGNOSIS — I48.91 ATRIAL FIBRILLATION (H): ICD-10-CM

## 2021-11-22 DIAGNOSIS — I48.91 ATRIAL FIBRILLATION (H): Primary | ICD-10-CM

## 2021-11-22 LAB — INR BLD: 4.1 (ref 0.9–1.1)

## 2021-11-22 PROCEDURE — 85610 PROTHROMBIN TIME: CPT

## 2021-11-22 PROCEDURE — 36416 COLLJ CAPILLARY BLOOD SPEC: CPT

## 2021-11-22 NOTE — PROGRESS NOTES
ANTICOAGULATION MANAGEMENT     Jamal Francis 89 year old male is on warfarin with supratherapeutic INR result. (Goal INR 2.0-3.0)    Recent labs: (last 7 days)     11/22/21  1136   INR 4.1*       ASSESSMENT     Source(s): Chart Review and Patient/Caregiver Call       Warfarin doses taken: More warfarin taken than planned which may be affecting INR    Diet: Son does not know about Ensure intake    New illness, injury, or hospitalization: No    Medication/supplement changes: None noted    Signs or symptoms of bleeding or clotting: No    Previous INR: Supratherapeutic    Additional findings: None     PLAN     Recommended plan for temporary change(s) affecting INR     Dosing Instructions: Continue your current warfarin dose with next INR in 1 week.  Patient was already given dose this morning of 10 mg, unable to hold.         Summary  As of 11/22/2021    Full warfarin instructions:  11/22: 10 mg; Otherwise 5 mg every Thu, Sat; 7.5 mg all other days   Next INR check:  11/29/2021             Telephone call with jaimee Kidd who verbalizes understanding and agrees to plan and will send my chart message.    Contact [phone]  to schedule and with any changes, questions or concerns.     Education provided: Goal range and significance of current result, Importance of therapeutic range, Importance of following up at instructed interval and Importance of taking warfarin as instructed    Plan made per [facility] anticoagulation protocol    Mattie Alvarez RN  Anticoagulation Clinic  11/22/2021    _______________________________________________________________________     Anticoagulation Episode Summary     Current INR goal:  2.0-3.0   TTR:  23.4 % (10.5 mo)   Target end date:  Indefinite   Send INR reminders to:  LI[facility] RG    Indications    Atrial fibrillation (H) [I48.91]  Long-term (current) use of anticoagulants [Z79.01] [Z79.01]  Atrial fibrillation  unspecified type (H) [I48.91]           Comments:  5 mg tablets, AM  dose           Anticoagulation Care Providers     Provider Role Specialty Phone number    Catarino Landrum DO Referring Internal Medicine [phone]    Vitor Alvarenga PA-C Responsible Internal Medicine [phone]

## 2021-11-30 ENCOUNTER — ANTICOAGULATION THERAPY VISIT (OUTPATIENT)
Dept: ANTICOAGULATION | Facility: CLINIC | Age: 86
End: 2021-11-30

## 2021-11-30 ENCOUNTER — LAB (OUTPATIENT)
Dept: LAB | Facility: CLINIC | Age: 86
End: 2021-11-30
Payer: COMMERCIAL

## 2021-11-30 DIAGNOSIS — Z79.01 LONG TERM CURRENT USE OF ANTICOAGULANT THERAPY: ICD-10-CM

## 2021-11-30 DIAGNOSIS — I48.91 ATRIAL FIBRILLATION (H): ICD-10-CM

## 2021-11-30 DIAGNOSIS — I48.91 ATRIAL FIBRILLATION, UNSPECIFIED TYPE (H): ICD-10-CM

## 2021-11-30 DIAGNOSIS — I48.91 ATRIAL FIBRILLATION (H): Primary | ICD-10-CM

## 2021-11-30 DIAGNOSIS — I48.19 PERSISTENT ATRIAL FIBRILLATION (H): ICD-10-CM

## 2021-11-30 LAB — INR BLD: 2.1 (ref 0.9–1.1)

## 2021-11-30 PROCEDURE — 36416 COLLJ CAPILLARY BLOOD SPEC: CPT

## 2021-11-30 PROCEDURE — 85610 PROTHROMBIN TIME: CPT

## 2021-11-30 RX ORDER — WARFARIN SODIUM 5 MG/1
TABLET ORAL
Qty: 115 TABLET | Refills: 0 | Status: SHIPPED | OUTPATIENT
Start: 2021-11-30 | End: 2021-12-16

## 2021-11-30 NOTE — TELEPHONE ENCOUNTER
Signed Prescriptions:                        Disp   Refills    warfarin ANTICOAGULANT (COUMADIN) 5 MG tab*115 ta*0        Sig: TAKE 5 mg MWF; 7.5 mg all other days OR AS DIRECTED           BY THE COUMADIN CLINIC  Authorizing Provider: JOON RAHMAN  Ordering User: CATRACHO DUKE RN refilled medication per [facility] refill protocol.  Catracho Duke RN

## 2021-11-30 NOTE — PROGRESS NOTES
Writer briefly spoke with son, Bernabe. He thinks the patient may have been taking 7.5 mg Thurs/Sat and 5 mg ROW instead of the other way around. He will check this with other family members who help with dosing and call [facility] back to confirm since writer cannot advise ongoing doses without knowing what he took the last week.    Also, his dosing will need to be spread out more evenly throughout the week once it is verified what he took.    Swathi Ding, RN, BSN, PHN

## 2021-11-30 NOTE — PROGRESS NOTES
ANTICOAGULATION MANAGEMENT     Jamal Francis 89 year old male is on warfarin with therapeutic INR result. (Goal INR 2.0-3.0)    Recent labs: (last 7 days)     11/30/21  1138   INR 2.1*       ASSESSMENT     Source(s): Chart Review and Patient/Caregiver Call       Warfarin doses taken: Less warfarin taken than planned which may be affecting INR    Diet: No new diet changes identified    New illness, injury, or hospitalization: No    Medication/supplement changes: None noted    Signs or symptoms of bleeding or clotting: No    Previous INR: Supratherapeutic    Additional findings: per conversation with son, Bernabe, patient has been alternating 5 mg and 7.5 mg every other day. This is what Bernabe's siblings have been setting up for pt. Writer explained why we would like the same weekly total as alternating causing more or less warfarin per week, depending on the week.Will adjust dose to 45 mg/week as patient has either had 42.5 mg or 45 mg in the last week (unable to verify exactly which days he had what). Recheck in 2 weeks.     PLAN     Recommended plan for temporary change(s) affecting INR     Dosing Instructions: take 5 mg MWF; 7.5 mg ROW with next INR in 2 weeks       Summary  As of 11/30/2021    Full warfarin instructions:  5 mg every Mon, Wed, Fri; 7.5 mg all other days   Next INR check:  12/14/2021             Telephone call with  Bernabe who verbalizes understanding and agrees to plan    Lab visit scheduled    Education provided: Please call back if any changes to your diet, medications or how you've been taking warfarin, Importance of taking warfarin as instructed and Contact [phone]  with any changes, questions or concerns.     Plan made per [facility] anticoagulation protocol    Swathi Ding RN  Anticoagulation Clinic  11/30/2021    _______________________________________________________________________     Anticoagulation Episode Summary     Current INR goal:  2.0-3.0   TTR:  23.9 % (10.8 mo)   Target end date:   Indefinite   Send [facility]R [facility]ers to:  [facility]    Indications    Atrial fibrillation (H) [I48.91]  Long-term (current) use of anticoagulants [Z79.01] [Z79.01]  Atrial fibrillation  unspecified type (H) [I48.91]           Comments:  5 mg tablets, AM dose           Anticoagulation Care Providers     Provider Role Specialty Phone number    Catarino Landrum DO Referring Internal Medicine [phone]    Vitor Alvarenga PA-C Responsible Internal Medicine [phone]

## 2021-11-30 NOTE — PATIENT INSTRUCTIONS
Dosing in the past was every other day 7.5 MG and the other days is 5 MG. Please call Bernabe back as soon as possible

## 2021-12-14 ENCOUNTER — ANTICOAGULATION THERAPY VISIT (OUTPATIENT)
Dept: ANTICOAGULATION | Facility: CLINIC | Age: 86
End: 2021-12-14

## 2021-12-14 ENCOUNTER — LAB (OUTPATIENT)
Dept: LAB | Facility: CLINIC | Age: 86
End: 2021-12-14
Payer: COMMERCIAL

## 2021-12-14 DIAGNOSIS — I48.91 ATRIAL FIBRILLATION (H): ICD-10-CM

## 2021-12-14 DIAGNOSIS — I48.91 ATRIAL FIBRILLATION, UNSPECIFIED TYPE (H): ICD-10-CM

## 2021-12-14 DIAGNOSIS — Z79.01 LONG TERM CURRENT USE OF ANTICOAGULANT THERAPY: ICD-10-CM

## 2021-12-14 DIAGNOSIS — I48.91 ATRIAL FIBRILLATION (H): Primary | ICD-10-CM

## 2021-12-14 LAB — INR BLD: 1.8 (ref 0.9–1.1)

## 2021-12-14 PROCEDURE — 36416 COLLJ CAPILLARY BLOOD SPEC: CPT

## 2021-12-14 PROCEDURE — 85610 PROTHROMBIN TIME: CPT

## 2021-12-14 NOTE — PROGRESS NOTES
ANTICOAGULATION MANAGEMENT     Jamal Francis 89 year old male is on warfarin with subtherapeutic INR result. (Goal INR 2.0-3.0)    Recent labs: (last 7 days)     12/14/21  1134   INR 1.8*       ASSESSMENT     Source(s): Chart Review and Patient/Caregiver Call       Warfarin doses taken: Warfarin taken as instructed    Diet: No new diet changes identified    New illness, injury, or hospitalization: No    Medication/supplement changes: None noted    Signs or symptoms of bleeding or clotting: No    Previous INR: Therapeutic last visit; previously outside of goal range    Additional findings: None     PLAN     Recommended plan for no diet, medication or health factor changes affecting INR     Dosing Instructions:  Increase your warfarin dose (5.6% change) with next INR in 2 weeks. Changed to 2.5 mg tablets    Summary  As of 12/14/2021    Full warfarin instructions:  5 mg every Mon, Fri; 7.5 mg all other days   Next INR check:  12/28/2021             Telephone call with  Son Bernabe who verbalizes understanding and agrees to plan [employer] [place] message sent    Lab visit scheduled    Education provided: Warfarin tablet strength change; remove and/or discard previous strength from medication supply and Monitoring for clotting signs and symptoms    Plan made per [facility] anticoagulation protocol    Miladys Bentley RN  Anticoagulation Clinic  12/14/2021    _______________________________________________________________________     Anticoagulation Episode Summary     Current INR goal:  2.0-3.0   TTR:  24.3 % (11.2 mo)   Target end date:  Indefinite   Send INR reminders to:  [facility] ARCENIO    Indications    Atrial fibrillation (H) [I48.91]  Long-term (current) use of anticoagulants [Z79.01] [Z79.01]  Atrial fibrillation  unspecified type (H) [I48.91]           Comments:  AM dose         Anticoagulation Care Providers     Provider Role Specialty Phone number    Catarino Landrum DO [facility] Internal Medicine [phone]     Vitor Alvarenga PA-C [facility] Internal Medicine [phone]

## 2021-12-15 DIAGNOSIS — I48.19 PERSISTENT ATRIAL FIBRILLATION (H): ICD-10-CM

## 2021-12-15 DIAGNOSIS — Z79.01 LONG TERM CURRENT USE OF ANTICOAGULANT THERAPY: ICD-10-CM

## 2021-12-16 ENCOUNTER — TELEPHONE (OUTPATIENT)
Dept: FAMILY MEDICINE | Facility: CLINIC | Age: 86
End: 2021-12-16
Payer: COMMERCIAL

## 2021-12-16 DIAGNOSIS — I48.19 PERSISTENT ATRIAL FIBRILLATION (H): Primary | ICD-10-CM

## 2021-12-16 RX ORDER — WARFARIN SODIUM 5 MG/1
TABLET ORAL
Qty: 100 TABLET | Refills: 0 | Status: SHIPPED | OUTPATIENT
Start: 2021-12-16 | End: 2021-12-16 | Stop reason: DRUGHIGH

## 2021-12-16 RX ORDER — WARFARIN SODIUM 2.5 MG/1
TABLET ORAL
Qty: 192 TABLET | Refills: 0 | Status: SHIPPED | OUTPATIENT
Start: 2021-12-16 | End: 2022-03-07

## 2021-12-16 NOTE — TELEPHONE ENCOUNTER
Left message for Santos whitt. Need to know if the 5 mg tablets were picked up. We'd prefer he has one tablet size, 2.5 mg would be easiest as he wouldn't have to cut them. If they've already picked up the 5 mg tablets, I can send enough 2.5 mg tablets and when done with 5 mg tablets, go to strictly 2.5 mg tablets.     I can be reached for return calls today at [phone] (internal staff only). Please do not give this number out to patients or cold transfer. Thank you!    Miladys Bentley, RN                Miladys Bentley BSN, RN- Coumadin Clinic RN

## 2021-12-16 NOTE — TELEPHONE ENCOUNTER
Spoke with Amparo. They have not picked up 5 mg tablets, so will order the full rx in 2.5 mg tablets going forward. She verbalizes understanding.     MIRIAM BroussardN, RN- Coumadin Clinic RN

## 2021-12-16 NOTE — TELEPHONE ENCOUNTER
Spoke to patient's granddaughter. They are wondering if they can get an RX for Coumadin 2.5 mg. Patient has been needing 7.5 mg a lot of days. Patient has a pill dispenser to make sure he takes his medications. It would be easier to load a 2.5 dose with the 5 mg on days he needs 7.5 rather than having to cut pills. They did get the 5 mg RX sent. They are wondering if they can also get 2.5 mg.     Will route to coumadin team.     Hansa Htafield, MIRIAMN, RN  [facility]

## 2021-12-29 ENCOUNTER — TELEPHONE (OUTPATIENT)
Dept: ANTICOAGULATION | Facility: CLINIC | Age: 86
End: 2021-12-29
Payer: COMMERCIAL

## 2021-12-29 NOTE — TELEPHONE ENCOUNTER
ANTICOAGULATION     Jamal Francis is overdue for INR check.      Was unable to reach patient and was unable to leave a voicemail. If patient calls, please schedule INR check as soon as possible.  [employer] message sent.    Clarice Gibbs RN on 12/29/2021 at 9:49 AM

## 2022-01-05 ENCOUNTER — ANTICOAGULATION THERAPY VISIT (OUTPATIENT)
Dept: ANTICOAGULATION | Facility: CLINIC | Age: 87
End: 2022-01-05

## 2022-01-05 ENCOUNTER — LAB (OUTPATIENT)
Dept: LAB | Facility: CLINIC | Age: 87
End: 2022-01-05
Payer: COMMERCIAL

## 2022-01-05 DIAGNOSIS — I48.91 ATRIAL FIBRILLATION, UNSPECIFIED TYPE (H): ICD-10-CM

## 2022-01-05 DIAGNOSIS — I48.91 ATRIAL FIBRILLATION (H): ICD-10-CM

## 2022-01-05 DIAGNOSIS — Z79.01 LONG TERM CURRENT USE OF ANTICOAGULANT THERAPY: ICD-10-CM

## 2022-01-05 DIAGNOSIS — I48.91 ATRIAL FIBRILLATION, UNSPECIFIED TYPE (H): Primary | ICD-10-CM

## 2022-01-05 LAB — INR BLD: 1.7 (ref 0.9–1.1)

## 2022-01-05 PROCEDURE — 85610 PROTHROMBIN TIME: CPT

## 2022-01-05 PROCEDURE — 36416 COLLJ CAPILLARY BLOOD SPEC: CPT

## 2022-01-05 NOTE — PROGRESS NOTES
Anticoagulation Management     Unable to reach pt today.     Today's INR result of 1.7 is subtherapeutic (goal INR of 2.0-3.0).  Result received from: Clinic Lab     Follow up required to confirm warfarin dose taken and assess for changes     Bernabe, pt's son, had been handling INR/dosing for pt, but now he is requesting that we discuss this with pt's granddaughter, Amparo [place] [phone]. I tried to call pt to see if he approves discussing this with her, but he did not answer. VM left to call [facility] back. Will try again later.       *advised 10 mg tonight on Wed 1/5        Anticoagulation clinic to follow up     Livia Cassidy RN

## 2022-01-05 NOTE — PROGRESS NOTES
Anticoagulation Management    Unable to reach pt today.    Today's INR result of 1.7 is subtherapeutic (goal INR of 2.0-3.0).  Result received from: Clinic Lab    Follow up required to confirm warfarin dose taken and assess for changes    Bernabe, pt's son, had been handling INR/dosing for pt, but now he is requesting that we discuss this with pt's granddaughter, Amparo Lancaster [phone]. I tried to call pt to see if he approves discussing this with her, but he did not answer. VM left to call [facility] back. Will try again later.       Anticoagulation clinic to follow up    Livia Cassidy RN

## 2022-01-05 NOTE — PROGRESS NOTES
Anticoagulation Management     Unable to reach pt today.     Today's INR result of 1.7 is subtherapeutic (goal INR of 2.0-3.0).  Result received from: Clinic Lab     Follow up required to confirm warfarin dose taken and assess for changes     Bernabe, pt's son, had been handling INR/dosing for pt, but now he is requesting that we discuss this with pt's granddaughter, Amparo Lancaster [phone]. I tried to call pt to see if he approves discussing this with her, but he did not answer. VM left to call [facility] back. Will try again later.         Anticoagulation clinic to follow up     Livia Cassidy RN

## 2022-01-06 NOTE — PROGRESS NOTES
Anticoagulation Management     Unable to reach pt today.    INR on Wed 1/6 was 1.7, which is subtherapeutic (goal INR of 2.0-3.0).  Result received from: Clinic Lab     Follow up required to confirm warfarin dose taken and assess for changes     Bernabe, pt's son, had been handling INR/dosing for pt, but now he is requesting that we discuss this with pt's granddaughter, Amparo Lancaster [phone]. I tried to call pt to see if he approves discussing this with her, but he did not answer. VM left to call [facility] back. Will try again later.         *advised 10 mg on Wed 1/5- will need to check to see if he got the message to take this dose         Anticoagulation clinic to follow up     Livia Cassidy RN

## 2022-01-06 NOTE — PROGRESS NOTES
ANTICOAGULATION MANAGEMENT     Jamal Francis 89 year old male is on warfarin with subtherapeutic INR result. (Goal INR 2.0-3.0)    Recent labs: (last 7 days)     01/05/22  1305   INR 1.7*       ASSESSMENT     Source(s): Chart Review and Patient/Caregiver Call       Warfarin doses taken: Warfarin taken as instructed    Diet: No new diet changes identified    New illness, injury, or hospitalization: No    Medication/supplement changes: None noted    Signs or symptoms of bleeding or clotting: No    Previous INR: Subtherapeutic    Additional findings: None     PLAN     Recommended plan for no diet, medication or health factor changes affecting INR     Dosing Instructions: Booster dose then Increase your warfarin dose (5.3% change) with next INR in 2 weeks       Summary  As of 1/5/2022    Full warfarin instructions:  1/6: 10 mg; Otherwise 5 mg every Mon; 7.5 mg all other days   Next INR check:  1/21/2022             Telephone call with  Granddaughter Amparo (sending a C2C form as she manages his medications), also to add son Bernabe who verbalizes understanding and agrees to plan and who agrees to plan and repeated back plan correctly    Lab visit scheduled    Education provided: Monitoring for clotting signs and symptoms    Plan made per [facility] anticoagulation protocol    Miladys Bentley, BRIANA  Anticoagulation Clinic  1/6/2022    _______________________________________________________________________     Anticoagulation Episode Summary     Current INR goal:  2.0-3.0   TTR:  22.8 % (12 mo)   Target end date:  Indefinite   Send INR reminders to:  [facility]    Indications    Atrial fibrillation (H) [I48.91]  Long-term (current) use of anticoagulants [Z79.01] [Z79.01]  Atrial fibrillation  unspecified type (H) [I48.91]           Comments:           Anticoagulation Care Providers     Provider Role Specialty Phone number    Catarino Landrum DO [facility] Internal Medicine [phone]    Vitor Alvarenga PA-C  [facility] Internal Medicine [phone]

## 2022-01-06 NOTE — PROGRESS NOTES
Anticoagulation Management    Unable to reach pt's granddaughter Healther today.    INR on Wed 1/5 was 1.7 which is subtherapeutic (goal INR of 2.0-3.0).  Result received from: Clinic Lab    Follow up required to confirm warfarin dose taken and assess for changes    Attempted to contact Amparo, pt's granddaughter, per Bernabe's (son's request) - see note below. I called [phone], no answer. VM left to call [facility] back. Will need to try again later.      Will need to send C2C to patient if he would like us to speak with Triny - will need to inform her of this as well.     Will need to check to see if pt did get VM left last night advising 10 mg.       Anticoagulation clinic to follow up    Livia Cassidy RN

## 2022-01-21 ENCOUNTER — LAB (OUTPATIENT)
Dept: LAB | Facility: CLINIC | Age: 87
End: 2022-01-21
Payer: COMMERCIAL

## 2022-01-21 ENCOUNTER — TELEPHONE (OUTPATIENT)
Dept: ANTICOAGULATION | Facility: CLINIC | Age: 87
End: 2022-01-21

## 2022-01-21 ENCOUNTER — ANTICOAGULATION THERAPY VISIT (OUTPATIENT)
Dept: ANTICOAGULATION | Facility: CLINIC | Age: 87
End: 2022-01-21

## 2022-01-21 DIAGNOSIS — Z79.01 LONG TERM CURRENT USE OF ANTICOAGULANT THERAPY: ICD-10-CM

## 2022-01-21 DIAGNOSIS — I48.91 ATRIAL FIBRILLATION, UNSPECIFIED TYPE (H): ICD-10-CM

## 2022-01-21 DIAGNOSIS — I48.19 PERSISTENT ATRIAL FIBRILLATION (H): ICD-10-CM

## 2022-01-21 DIAGNOSIS — I48.91 ATRIAL FIBRILLATION (H): Primary | ICD-10-CM

## 2022-01-21 DIAGNOSIS — I48.91 ATRIAL FIBRILLATION (H): ICD-10-CM

## 2022-01-21 LAB — INR BLD: 2.3 (ref 0.9–1.1)

## 2022-01-21 PROCEDURE — 85610 PROTHROMBIN TIME: CPT

## 2022-01-21 PROCEDURE — 36416 COLLJ CAPILLARY BLOOD SPEC: CPT

## 2022-01-21 NOTE — PROGRESS NOTES
ANTICOAGULATION MANAGEMENT     Jamal Francis 89 year old male is on warfarin with therapeutic INR result. (Goal INR 2.0-3.0)    Recent labs: (last 7 days)     01/21/22  1314   INR 2.3*       ASSESSMENT     Source(s): Chart Review and Patient/Caregiver Call       Warfarin doses taken: Warfarin taken as instructed    Diet: No new diet changes identified    New illness, injury, or hospitalization: No    Medication/supplement changes: None noted    Signs or symptoms of bleeding or clotting: No    Previous INR: Subtherapeutic    Additional findings: None     PLAN     Recommended plan for no diet, medication or health factor changes affecting INR     Dosing Instructions: Continue your current warfarin dose with next INR in 3 weeks       Summary  As of 1/21/2022    Full warfarin instructions:  5 mg every Mon; 7.5 mg all other days   Next INR check:  2/11/2022             Telephone call with  Bernabe who verbalizes understanding and agrees to plan    Lab visit scheduled    Education provided: Please call back if any changes to your diet, medications or how you've been taking warfarin    Plan made per [facility] anticoagulation protocol    Clarice Gibbs RN  Anticoagulation Clinic  1/21/2022    _______________________________________________________________________     Anticoagulation Episode Summary     Current INR goal:  2.0-3.0   TTR:  24.6 % (1 y)   Target end date:  Indefinite   Send INR reminders to:  [facility]    Indications    Atrial fibrillation (H) [I48.91]  Long-term (current) use of anticoagulants [Z79.01] [Z79.01]  Atrial fibrillation  unspecified type (H) [I48.91]           Comments:           Anticoagulation Care Providers     Provider Role Specialty Phone number    Catarino Landrum DO Referring Internal Medicine [phone]    Vitor Alvarenga PA-C Responsible Internal Medicine [phone]

## 2022-01-21 NOTE — TELEPHONE ENCOUNTER
ANTICOAGULATION MANAGEMENT      Jamal Francis due for annual renewal of referral to anticoagulation monitoring. Order pended for your review and signature.      ANTICOAGULATION SUMMARY      Warfarin indication(s)     Atrial fibrillation    Heart valve present?  NO       Current goal range   INR: 2.0-3.0     Goal appropriate for indication? Yes, INR 2-3 appropriate for hx of DVT, PE, hypercoagulable state, Afib, LVAD, or bileaflet AVR without risk factors     Current duration of therapy Indefinite/long term therapy   Time in Therapeutic Range (TTR)  (Goal > 60%) 24.6%       Office visit with referring provider's group within last year yes on 10/8/21       Clarice Gibbs RN

## 2022-01-22 PROBLEM — I48.19 PERSISTENT ATRIAL FIBRILLATION (H): Status: ACTIVE | Noted: 2022-01-22

## 2022-01-24 ENCOUNTER — OFFICE VISIT (OUTPATIENT)
Dept: FAMILY MEDICINE | Facility: CLINIC | Age: 87
End: 2022-01-24
Payer: COMMERCIAL

## 2022-01-24 VITALS
DIASTOLIC BLOOD PRESSURE: 86 MMHG | OXYGEN SATURATION: 97 % | TEMPERATURE: 96.8 F | SYSTOLIC BLOOD PRESSURE: 138 MMHG | BODY MASS INDEX: 27.81 KG/M2 | WEIGHT: 162 LBS | HEART RATE: 112 BPM

## 2022-01-24 DIAGNOSIS — M75.51 ACUTE BURSITIS OF RIGHT SHOULDER: ICD-10-CM

## 2022-01-24 DIAGNOSIS — I48.91 ATRIAL FIBRILLATION, UNSPECIFIED TYPE (H): ICD-10-CM

## 2022-01-24 DIAGNOSIS — J43.9 PULMONARY EMPHYSEMA, UNSPECIFIED EMPHYSEMA TYPE (H): ICD-10-CM

## 2022-01-24 DIAGNOSIS — M25.511 ACUTE PAIN OF RIGHT SHOULDER: Primary | ICD-10-CM

## 2022-01-24 DIAGNOSIS — R21 RASH AND NONSPECIFIC SKIN ERUPTION: ICD-10-CM

## 2022-01-24 DIAGNOSIS — Z79.01 LONG TERM CURRENT USE OF ANTICOAGULANT THERAPY: ICD-10-CM

## 2022-01-24 PROCEDURE — 99214 OFFICE O/P EST MOD 30 MIN: CPT | Performed by: PHYSICIAN ASSISTANT

## 2022-01-24 RX ORDER — BENZOCAINE/MENTHOL 6 MG-10 MG
LOZENGE MUCOUS MEMBRANE
Qty: 60 G | Refills: 1 | Status: SHIPPED | OUTPATIENT
Start: 2022-01-24 | End: 2022-03-29

## 2022-01-24 ASSESSMENT — PAIN SCALES - GENERAL: PAINLEVEL: NO PAIN (0)

## 2022-01-24 NOTE — PATIENT INSTRUCTIONS
Patient Education     Bursitis    You have bursitis. This is an inflammation of the bursa. These are small, fluid-filled sacs that surround the larger joints of the body. The bursa help the muscles and tendons move smoothly over the joints.  Bursitis often happens in the shoulder. But it can also affect the elbows, hips, pelvis, knees, toes, and heels. Bursitis can be caused by injury, overuse of the joint, or infection of the bursa. Symptoms include pain and tenderness over a joint. Symptoms get worse with movement.  Bursitis is treated with an anti-inflammatory medicine and by resting the joint. More severe cases require injection of medicine directly into the bursa. In the case of infection, surgery and antibiotics may be needed.  Home care    Rest the painful joint and protect it from movement. This will allow the inflammation to heal faster.    Apply an ice pack over the injured area for no more than 15 to 20 minutes. Do this every 3 to 6 hours for the first 24 to 48 hours. Keep using ice packs 3 to 4 times a day until the pain and swelling improves.     To make an ice pack, put ice cubes in a sealed plastic bag. Wrap the bag in a clean, thin towel or cloth. Never put ice or an ice pack directly on the skin. As the ice melts, be careful to not to get the wrap or splint wet.    You may take over-the-counter pain medicine to treat pain and inflammation, unless another medicine was prescribed. Anti-inflammatory pain medicines may be more effective. Talk with your provider before using these medicines if you have chronic liver or kidney disease, or ever had a stomach ulcer or gastrointestinal bleeding.    As your symptoms improve, slowly begin to move the joint. Don't overuse the joint. This may cause the symptoms to flare up again.  When to seek medical advice  Call your healthcare provider right away if any of these occur:    Redness or warmth over the painful area    Increasing pain or swelling at the  joint    Fever of 100.4 F (38 C) or above lasting for 24 to 48 hours, or as advised    [employer] last reviewed this educational content on 5/1/2018 2000-2021 [employer]. All rights reserved. This information is not intended as a substitute for professional medical care. Always follow your healthcare professional's instructions.           Patient Education     Atopic Dermatitis (Adult)  Atopic dermatitis is a dry, itchy, red rash. The rash is chronic, or ongoing. It can come and go over time. The disease is often passed down in families. It causes a problem with the skin barrier that makes the skin more sensitive to the environment and other factors. The increased skin sensitivity causes an itch, which causes scratching. Scratching can worsen the itching or also break the skin. This can put the skin at risk of infection.   The condition is most common in people with asthma, hay fever, hives, or dry or sensitive skin. The rash may be caused by extreme heat or heavy sweating. Skin irritants can cause the rash to flare up. These can include wool or silk clothing, grease, oils, some medicines, and harsh soaps and detergents. Emotional stress can also be a trigger.   Treatment is done to relieve the itching and inflammation of the skin. This is often done with home care and over-the-counter treatments. Your healthcare provider may prescribe other treatments.   Home care  Follow these tips to care for your condition:    Keep the areas of rash clean by bathing at least every other day. Use lukewarm water to bathe. Don t use hot water, which can dry out the skin.    Don t use soaps with strong detergents. Use mild soaps made for sensitive skin.    Apply a cream or ointment to damp skin right after bathing.    Avoid things that irritate your skin. Wear absorbent, soft fabrics next to the skin rather than rough or scratchy materials.    Use mild laundry soap free of scents and perfumes. Make sure to  rinse all the soap out of your clothes.    Treat any skin infection as directed.    Use oral diphenhydramine to help reduce itching. This is an antihistamine you can buy at drug and grocery stores. It can make you sleepy, so use lower doses during the daytime. Be cautious of driving or operating machinery. Or you can use loratadine or other antihistamines that will not make you sleepy. Don't use diphenhydramine if you have glaucoma or have trouble urinating due to an enlarged prostate.  Follow-up care  See your healthcare provider, or as advised. If your symptoms don t get better or if they get worse in the next 7 days, make an appointment with your healthcare provider.   When to seek medical advice  Call your healthcare provider right away  if any of these occur:    Increasing area of redness or pain in the skin    Yellow crusts or wet drainage from the rash    Fever of 100.4 F (38 C) or higher, or as directed by your healthcare provider  [employer] last reviewed this educational content on 8/1/2019 2000-2021 [employer]. All rights reserved. This information is not intended as a substitute for professional medical care. Always follow your healthcare professional's instructions.

## 2022-01-24 NOTE — PROGRESS NOTES
Assessment & Plan     Acute pain of right shoulder  Acute bursitis of right shoulder  No recent injury or triggering activity causing the pain. On exam tenderness to palpation over the AC joint and pain with crossover test. 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 He is on chronic anticoagulation. I am hesitant to start him on oral therapies due to interaction. I have put in an orthopedic referral for him to discuss injection. He will have voltaren gel to use up to 4x/day to manage symptoms. Educational information attached to AVS.   - diclofenac (VOLTAREN) 1 % topical gel; Apply 2 g topically 4 times daily as needed for moderate pain    Rash and nonspecific skin eruption  Patient has pruritic rash over the anterior lower legs. Will have him use low strength topical steroid as needed for itching. If not improving as expected will add oral antihistamine.   - hydrocortisone (CORTAID) 1 % external cream; Apply thin layer to itchy areas up to twice daily as needed    Pulmonary emphysema, unspecified emphysema type (H)  Atrial fibrillation, unspecified type (H)  Long-term (current) use of anticoagulants [Z79.01]  Patient's spouse is in long term care facility. He is trying to remain independent and family would like to see if there are any services or programs which he would qualify for. Care coordination will reach out to the patient.   - Care Coordination Referral; Future    Return in about 1 week (around 1/31/2022) for - call if not improving.    Vitor Alvarenga PA-C  [facility] ARCENIO Berry is a 89 year old who presents for the following health issues     Shoulder Pain         Pain History:  When did you first notice your pain? - 1 to 6 weeks   Have you seen any provider previously for this issue? No  How has your pain affected your ability to work? Not applicable  Where in your body do you have pain? Musculoskeletal problem/pain  Onset/Duration: couple  weeks ago  Description  Location: shoulder and arm - right  Joint Swelling: no  Redness: YES  Pain: YES  Warmth: no  Intensity:  moderate  Progression of Symptoms:  improving  Accompanying signs and symptoms:   Fevers: no  Numbness/tingling/weakness: no  History  Trauma to the area: no  Recent illness:  no  Previous similar problem: no  Previous evaluation:  no  Precipitating or alleviating factors:  Aggravating factors include: overuse  Therapies tried and outcome: heat, acetaminophen and Ibuprofen    Review of Systems   Constitutional, HEENT, cardiovascular, pulmonary, gi and gu systems are negative, except as otherwise noted.      Objective    /86   Pulse 112   Temp 96.8  F (36  C) (Temporal)   Wt 73.5 kg (162 lb)   SpO2 97%   BMI 27.81 kg/m    Body mass index is 27.81 kg/m .  Physical Exam   GENERAL: healthy, alert and no distress  NECK: no adenopathy, no asymmetry, masses, or scars and thyroid normal to palpation  RESP: lungs clear to auscultation - no rales, rhonchi or wheezes  CV: regular rate and rhythm, normal S1 S2, no S3 or S4, no murmur, click or rub, no peripheral edema and peripheral pulses strong  MS: Normal AROM of the right shoulder with pain during full abduction and flexion. Pain during crossover test. Tenderness to palpation over the right AC joint.   SKIN: excoriations over the anterior lower legs  PSYCH: mentation appears normal, affect normal/bright

## 2022-01-26 ENCOUNTER — PATIENT OUTREACH (OUTPATIENT)
Dept: CARE COORDINATION | Facility: CLINIC | Age: 87
End: 2022-01-26
Payer: COMMERCIAL

## 2022-01-26 NOTE — PROGRESS NOTES
Clinic Care Coordination Contact  [facility]/Voicemail       Clinical Data: CHW Outreach  Outreach attempted x 1. Left message on patient's voicemail with call back information and requested return call.    Plan: CHW will try to reach patient again in 1-2 business days.    Notes:  -schedule with  ()  -needing help with housing    Lelo Ann  Community Health Worker   [facility]  Care Coordination  [facility], Chela Marinelli[facility]  [email or web address]   Office: [phone]

## 2022-01-26 NOTE — LETTER
[facility] CARE COORDINATION  [address] [address]    January 27, 2022    Jamal Francis  [address] [place] [address]      Dear Jamal,    I am a clinic community health worker who works with Vitor Alvarenga PA-C at [facility]. I have been trying to reach you recently to introduce Clinic Care Coordination and to see if there was anything I could assist you with.  Below is a description of clinic care coordination and how I can further assist you.      The clinic care coordination team is made up of a registered nurse,  and community health worker who understand the health care system. The goal of clinic care coordination is to help you manage your health and improve access to the health care system in the most efficient manner. The team can assist you in meeting your health care goals by providing education, coordinating services, strengthening the communication among your providers and supporting you with any resource needs.    Please feel free to contact me at [phone] with any questions or concerns. We are focused on providing you with the highest-quality healthcare experience possible and that all starts with you.     Sincerely,     Lelo Ann  Community Health Worker   [facility]  Care Coordination  Lisa Recio Rogers, [place], [facility]  [email or web address]  [email or web address]   Office: [phone]

## 2022-01-27 NOTE — PROGRESS NOTES
Clinic Care Coordination Contact  [facility]       Clinical Data: CHW Outreach  Outreach attempted x 2. Left message on patient's voicemail with call back information and requested return call.    Plan: CHW will send unable to contact letter with care coordinator contact information via [employer]. CHW will do no further outreaches at this time.    Lelo Ann  Community Health Worker   [facility]  Care Coordination  [facility], [facility]elli[facility]  [email or web address]   Office: [phone]

## 2022-02-11 ENCOUNTER — LAB (OUTPATIENT)
Dept: LAB | Facility: CLINIC | Age: 87
End: 2022-02-11
Payer: COMMERCIAL

## 2022-02-11 ENCOUNTER — ANTICOAGULATION THERAPY VISIT (OUTPATIENT)
Dept: ANTICOAGULATION | Facility: CLINIC | Age: 87
End: 2022-02-11

## 2022-02-11 DIAGNOSIS — I48.91 ATRIAL FIBRILLATION, UNSPECIFIED TYPE (H): Primary | ICD-10-CM

## 2022-02-11 DIAGNOSIS — I48.91 ATRIAL FIBRILLATION, UNSPECIFIED TYPE (H): ICD-10-CM

## 2022-02-11 DIAGNOSIS — I48.91 ATRIAL FIBRILLATION (H): ICD-10-CM

## 2022-02-11 DIAGNOSIS — Z79.01 LONG TERM CURRENT USE OF ANTICOAGULANT THERAPY: ICD-10-CM

## 2022-02-11 DIAGNOSIS — I48.19 PERSISTENT ATRIAL FIBRILLATION (H): ICD-10-CM

## 2022-02-11 LAB — INR BLD: 2.9 (ref 0.9–1.1)

## 2022-02-11 PROCEDURE — 36416 COLLJ CAPILLARY BLOOD SPEC: CPT

## 2022-02-11 PROCEDURE — 85610 PROTHROMBIN TIME: CPT

## 2022-02-11 NOTE — PROGRESS NOTES
Anticoagulation Management    Unable to reach Amparo, pt's granddaughter, today.    Today's INR result of 2.9 is therapeutic (goal INR of 2.0-3.0).  Result received from: Clinic Lab    Follow up required to confirm warfarin dose taken and assess for changes    Vm left to call [facility] back. Will try again later.       Anticoagulation clinic to follow up    Livia Cassidy RN

## 2022-02-11 NOTE — PROGRESS NOTES
ANTICOAGULATION MANAGEMENT     Jamal Francis 89 year old male is on warfarin with therapeutic INR result. (Goal INR 2.0-3.0)    Recent labs: (last 7 days)     02/11/22  1120   INR 2.9*       ASSESSMENT     Source(s): Chart Review and Patient/Caregiver Call - Amparo       Warfarin doses taken: Warfarin taken as instructed    Diet: No new diet changes identified    New illness, injury, or hospitalization: No    Medication/supplement changes: None noted    Signs or symptoms of bleeding or clotting: No    Previous INR: Therapeutic last visit; previously outside of goal range    Additional findings: None     PLAN     Recommended plan for no diet, medication or health factor changes affecting INR     Dosing Instructions: Continue your current warfarin dose with next INR in 4 weeks       Summary  As of 2/11/2022    Full warfarin instructions:  5 mg every Mon; 7.5 mg all other days   Next INR check:  3/11/2022             Telephone call with  Amparo  who verbalizes understanding and agrees to plan    Lab visit scheduled    Education provided: Please call back if any changes to your diet, medications or how you've been taking warfarin    Plan made per [facility] anticoagulation protocol    Livia Cassidy RN  Anticoagulation Clinic  2/11/2022    _______________________________________________________________________     Anticoagulation Episode Summary     Current INR goal:  2.0-3.0   TTR:  30.3 % (1 y)   Target end date:  Indefinite   Send INR reminders to:  [facility]    Indications    Atrial fibrillation (H) [I48.91]  Long-term (current) use of anticoagulants [Z79.01] [Z79.01]  Atrial fibrillation  unspecified type (H) [I48.91]  Persistent atrial fibrillation (H) [I48.19]           Comments:           Anticoagulation Care Providers     Provider Role Specialty Phone number    Catarino Landrum DO Referring Internal Medicine [phone]    Vitor Alvarenga PA-C Referring Internal Medicine [phone]

## 2022-03-05 DIAGNOSIS — I48.19 PERSISTENT ATRIAL FIBRILLATION (H): ICD-10-CM

## 2022-03-05 DIAGNOSIS — Z79.01 LONG TERM CURRENT USE OF ANTICOAGULANT THERAPY: ICD-10-CM

## 2022-03-07 RX ORDER — WARFARIN SODIUM 2.5 MG/1
TABLET ORAL
Qty: 90 TABLET | Refills: 0 | Status: SHIPPED | OUTPATIENT
Start: 2022-03-07 | End: 2022-04-18

## 2022-03-07 RX ORDER — WARFARIN SODIUM 2.5 MG/1
TABLET ORAL
Qty: 90 TABLET | Refills: 0 | OUTPATIENT
Start: 2022-03-07

## 2022-03-11 ENCOUNTER — APPOINTMENT (OUTPATIENT)
Dept: CT IMAGING | Facility: CLINIC | Age: 87
End: 2022-03-11
Attending: EMERGENCY MEDICINE
Payer: COMMERCIAL

## 2022-03-11 ENCOUNTER — APPOINTMENT (OUTPATIENT)
Dept: GENERAL RADIOLOGY | Facility: CLINIC | Age: 87
End: 2022-03-11
Attending: EMERGENCY MEDICINE
Payer: COMMERCIAL

## 2022-03-11 ENCOUNTER — ANTICOAGULATION THERAPY VISIT (OUTPATIENT)
Dept: ANTICOAGULATION | Facility: CLINIC | Age: 87
End: 2022-03-11

## 2022-03-11 ENCOUNTER — HOSPITAL ENCOUNTER (EMERGENCY)
Facility: CLINIC | Age: 87
Discharge: HOME | End: 2022-03-11
Attending: EMERGENCY MEDICINE | Admitting: EMERGENCY MEDICINE
Payer: COMMERCIAL

## 2022-03-11 VITALS
TEMPERATURE: 97.5 F | RESPIRATION RATE: 18 BRPM | SYSTOLIC BLOOD PRESSURE: 166 MMHG | BODY MASS INDEX: 28.84 KG/M2 | OXYGEN SATURATION: 96 % | WEIGHT: 168 LBS | HEART RATE: 118 BPM | DIASTOLIC BLOOD PRESSURE: 112 MMHG

## 2022-03-11 DIAGNOSIS — I48.91 ATRIAL FIBRILLATION, UNSPECIFIED TYPE (H): ICD-10-CM

## 2022-03-11 DIAGNOSIS — I48.19 PERSISTENT ATRIAL FIBRILLATION (H): ICD-10-CM

## 2022-03-11 DIAGNOSIS — J18.9 ATYPICAL PNEUMONIA: ICD-10-CM

## 2022-03-11 DIAGNOSIS — Z79.01 LONG TERM CURRENT USE OF ANTICOAGULANT THERAPY: ICD-10-CM
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
## 2022-03-11 DIAGNOSIS — S42.001A CLOSED DISPLACED FRACTURE OF RIGHT CLAVICLE, UNSPECIFIED PART OF CLAVICLE, INITIAL ENCOUNTER: ICD-10-CM

## 2022-03-11 DIAGNOSIS — R79.1 ELEVATED INR: ICD-10-CM
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
## 2022-03-11 DIAGNOSIS — I48.91 ATRIAL FIBRILLATION (H): Primary | ICD-10-CM

## 2022-03-11 LAB
ALBUMIN UR-MCNC: 30 MG/DL
ANION GAP SERPL CALCULATED.3IONS-SCNC: 7 MMOL/L (ref 3–14)
APPEARANCE UR: CLEAR
BASOPHILS # BLD AUTO: 0.1 10E3/UL (ref 0–0.2)
BASOPHILS NFR BLD AUTO: 1 %
BILIRUB UR QL STRIP: NEGATIVE
BUN SERPL-MCNC: 25 MG/DL (ref 7–30)
CALCIUM SERPL-MCNC: 8.5 MG/DL (ref 8.5–10.1)
CHLORIDE BLD-SCNC: 106 MMOL/L (ref 94–109)
CO2 SERPL-SCNC: 22 MMOL/L (ref 20–32)
COLOR UR AUTO: YELLOW
CREAT SERPL-MCNC: 1.21 MG/DL (ref 0.66–1.25)
EOSINOPHIL # BLD AUTO: 0.1 10E3/UL (ref 0–0.7)
EOSINOPHIL NFR BLD AUTO: 1 %
ERYTHROCYTE [DISTWIDTH] IN BLOOD BY AUTOMATED COUNT: 15.2 % (ref 10–15)
FLUAV RNA SPEC QL NAA+PROBE: NEGATIVE
FLUBV RNA RESP QL NAA+PROBE: NEGATIVE
GFR SERPL CREATININE-BSD FRML MDRD: 57 ML/MIN/1.73M2
GLUCOSE BLD-MCNC: 169 MG/DL (ref 70–99)
GLUCOSE UR STRIP-MCNC: NEGATIVE MG/DL
HCT VFR BLD AUTO: 30.9 % (ref 40–53)
HGB BLD-MCNC: 10.2 G/DL (ref 13.3–17.7)
HGB UR QL STRIP: ABNORMAL
HYALINE CASTS: 1 /LPF
IMM GRANULOCYTES # BLD: 0 10E3/UL
IMM GRANULOCYTES NFR BLD: 0 %
INR PPP: 3.29 (ref 0.85–1.15)
KETONES UR STRIP-MCNC: 5 MG/DL
LEUKOCYTE ESTERASE UR QL STRIP: ABNORMAL
LYMPHOCYTES # BLD AUTO: 0.8 10E3/UL (ref 0.8–5.3)
LYMPHOCYTES NFR BLD AUTO: 11 %
MCH RBC QN AUTO: 28.1 PG (ref 26.5–33)
MCHC RBC AUTO-ENTMCNC: 33 G/DL (ref 31.5–36.5)
MCV RBC AUTO: 85 FL (ref 78–100)
MONOCYTES # BLD AUTO: 0.6 10E3/UL (ref 0–1.3)
MONOCYTES NFR BLD AUTO: 8 %
MUCOUS THREADS #/AREA URNS LPF: PRESENT /LPF
NEUTROPHILS # BLD AUTO: 5.7 10E3/UL (ref 1.6–8.3)
NEUTROPHILS NFR BLD AUTO: 79 %
NITRATE UR QL: NEGATIVE
NRBC # BLD AUTO: 0 10E3/UL
NRBC BLD AUTO-RTO: 0 /100
PH UR STRIP: 5 [PH] (ref 5–7)
PLATELET # BLD AUTO: 260 10E3/UL (ref 150–450)
POTASSIUM BLD-SCNC: 3.7 MMOL/L (ref 3.4–5.3)
RBC # BLD AUTO: 3.63 10E6/UL (ref 4.4–5.9)
RBC URINE: 6 /HPF
SARS-COV-2 RNA RESP QL NAA+PROBE: NEGATIVE
SODIUM SERPL-SCNC: 135 MMOL/L (ref 133–144)
SP GR UR STRIP: 1.03 (ref 1–1.03)
UROBILINOGEN UR STRIP-MCNC: NORMAL MG/DL
WBC # BLD AUTO: 7.3 10E3/UL (ref 4–11)
WBC URINE: 9 /HPF

## 2022-03-11 PROCEDURE — 258N000003 HC RX IP 258 OP 636: Performed by: EMERGENCY MEDICINE

## 2022-03-11 PROCEDURE — 73030 X-RAY EXAM OF SHOULDER: CPT | Mod: RT

## 2022-03-11 PROCEDURE — 87636 SARSCOV2 & INF A&B AMP PRB: CPT | Performed by: EMERGENCY MEDICINE

## 2022-03-11 PROCEDURE — 250N000013 HC RX MED GY IP 250 OP 250 PS 637: Performed by: EMERGENCY MEDICINE

## 2022-03-11 PROCEDURE — 80048 BASIC METABOLIC PNL TOTAL CA: CPT | Performed by: EMERGENCY MEDICINE

## 2022-03-11 PROCEDURE — 96361 HYDRATE IV INFUSION ADD-ON: CPT | Performed by: EMERGENCY MEDICINE

## 2022-03-11 PROCEDURE — 99285 EMERGENCY DEPT VISIT HI MDM: CPT | Mod: 25 | Performed by: EMERGENCY MEDICINE

## 2022-03-11 PROCEDURE — 36415 COLL VENOUS BLD VENIPUNCTURE: CPT | Performed by: EMERGENCY MEDICINE

## 2022-03-11 PROCEDURE — 81001 URINALYSIS AUTO W/SCOPE: CPT | Performed by: EMERGENCY MEDICINE

## 2022-03-11 PROCEDURE — C9803 HOPD COVID-19 SPEC COLLECT: HCPCS | Performed by: EMERGENCY MEDICINE

## 2022-03-11 PROCEDURE — 96360 HYDRATION IV INFUSION INIT: CPT | Mod: 59 | Performed by: EMERGENCY MEDICINE

## 2022-03-11 PROCEDURE — 85610 PROTHROMBIN TIME: CPT | Performed by: EMERGENCY MEDICINE

## 2022-03-11 PROCEDURE — 71250 CT THORAX DX C-: CPT

## 2022-03-11 PROCEDURE — 85014 HEMATOCRIT: CPT | Performed by: EMERGENCY MEDICINE

## 2022-03-11 PROCEDURE — 70450 CT HEAD/BRAIN W/O DYE: CPT

## 2022-03-11 PROCEDURE — 99284 EMERGENCY DEPT VISIT MOD MDM: CPT | Performed by: EMERGENCY MEDICINE

## 2022-03-11 PROCEDURE — 72125 CT NECK SPINE W/O DYE: CPT

## 2022-03-11 RX ORDER — OXYCODONE HYDROCHLORIDE 5 MG/1
2.5 TABLET ORAL EVERY 8 HOURS PRN
Qty: 16 TABLET | Refills: 0 | Status: SHIPPED | OUTPATIENT
Start: 2022-03-11 | End: 2022-03-29

## 2022-03-11 RX ORDER — ACETAMINOPHEN 500 MG
1000 TABLET ORAL ONCE
Status: COMPLETED | OUTPATIENT
Start: 2022-03-11 | End: 2022-03-11

## 2022-03-11 RX ORDER — SODIUM CHLORIDE 9 MG/ML
INJECTION, SOLUTION INTRAVENOUS CONTINUOUS
Status: DISCONTINUED | OUTPATIENT
Start: 2022-03-11 | End: 2022-03-11 | Stop reason: HOSPADM

## 2022-03-11 RX ORDER — OLANZAPINE 5 MG/1
5 TABLET, ORALLY DISINTEGRATING ORAL ONCE
Status: COMPLETED | OUTPATIENT
Start: 2022-03-11 | End: 2022-03-11

## 2022-03-11 RX ORDER — AZITHROMYCIN 250 MG/1
TABLET, FILM COATED ORAL
Qty: 6 TABLET | Refills: 0 | Status: SHIPPED | OUTPATIENT
Start: 2022-03-11 | End: 2022-03-16

## 2022-03-11 RX ADMIN — SODIUM CHLORIDE 500 ML: 9 INJECTION, SOLUTION INTRAVENOUS at 09:05

## 2022-03-11 RX ADMIN — OLANZAPINE 5 MG: 5 TABLET, ORALLY DISINTEGRATING ORAL at 09:38

## 2022-03-11 RX ADMIN — ACETAMINOPHEN 1000 MG: 500 TABLET, FILM COATED ORAL at 09:10

## 2022-03-11 NOTE — PROGRESS NOTES
ANTICOAGULATION MANAGEMENT     Jamal Francis 89 year old male is on warfarin with supratherapeutic INR result. (Goal INR 2.0-3.0)    Recent labs: (last 7 days)     03/11/22  0752   INR 3.29*       ASSESSMENT       Source(s): Chart Review and Patient/Caregiver Call       Warfarin doses taken: Warfarin taken as instructed    Diet: No new diet changes identified    New illness, injury, or hospitalization: Yes: ED visit today for fall/pneumonia/fx of clavicle    Medication/supplement changes: Tylenol as needed use which has potential for interaction; increasing INR    azithromycin 5 day course (dates: 3/11-3/16) which has potential for interaction; increasing INR    Signs or symptoms of bleeding or clotting: No    Previous INR: Therapeutic last 2(+) visits    Additional findings: None       PLAN     Recommended plan for temporary change(s) affecting INR     Dosing Instructions: Take 5 mg Fri, Sat, Sun due to fall, swelling, infection and antibiotic with next INR in 3 days       Summary  As of 3/11/2022    Full warfarin instructions:  3/11: 5 mg; 3/12: 5 mg; 3/13: 5 mg; Otherwise 5 mg every Mon; 7.5 mg all other days   Next INR check:  3/14/2022             Telephone call with  Granddaughter Amparo who verbalizes understanding and agrees to plan and who agrees to plan and repeated back plan correctly    Lab visit scheduled    Education provided: Please call back if any changes to your diet, medications or how you've been taking warfarin, Goal range and significance of current result, Importance of therapeutic range, Importance of following up at instructed interval, Importance of taking warfarin as instructed, No interaction anticipated between warfarin and z-guanako/tylenol, Monitoring for bleeding signs and symptoms, Monitoring for clotting signs and symptoms and When to seek medical attention/emergency care    Plan made per [facility] anticoagulation protocol    Marilee Feliciano RN  Anticoagulation  Clinic  3/11/2022    _______________________________________________________________________     Anticoagulation Episode Summary     Current INR goal:  2.0-3.0   TTR:  28.6 % (1 y)   Target end date:  Indefinite   Send INR reminders to:  [facility]    Indications    Atrial fibrillation (H) [I48.91]  Long-term (current) use of anticoagulants [Z79.01] [Z79.01]  Atrial fibrillation  unspecified type (H) [I48.91]  Persistent atrial fibrillation (H) [I48.19]           Comments:           Anticoagulation Care Providers     Provider Role Specialty Phone number    Catarino Landrum DO Referring Internal Medicine [phone]    Vitor Alvarenga PA-C Referring Internal Medicine [phone]

## 2022-03-11 NOTE — ED PROVIDER NOTES
"  History     Chief Complaint   Patient presents with     Fall     HPI  History per patient and medical records    This is an 89-year-old male, history of atrial fibrillation on Coumadin, COPD presenting after a fall.  Patient fell at some point last night/yesterday.  He states he was in the kitchen and lost his balance.  He notes pain on his clavicle.  Pain is primarily in the medial portion of the right side of the chest with noted swelling.  He placed some Voltaren cream over the area and took Tylenol.  He may have hit his head but \"it does not hurt anymore\".  He denies any neck pain, back pain, abdominal pain, extremity pain or injury.  It does hurt to move his right arm.  He was able to get himself up to standing after the fall.  No recent illnesses, fevers, chills, nausea or vomiting, bowel or bladder issues.  He does not remember any dizziness.    Allergies:  Allergies   Allergen Reactions     Sulfa Drugs      dizziness       Problem List:    Patient Active Problem List    Diagnosis Date Noted     Persistent atrial fibrillation (H) 01/22/2022     Priority: Medium     Atrial fibrillation, unspecified type (H) 02/04/2021     Priority: Medium     Essential hypertension, benign 05/20/2016     Priority: Medium     Long-term (current) use of anticoagulants [Z79.01] 03/29/2016     Priority: Medium     COPD (chronic obstructive pulmonary disease) (H) 03/17/2015     Priority: Medium     Advanced directives, counseling/discussion 10/04/2012     Priority: Medium     .Patient states has Advance Directive and will bring in a copy to clinic. 10/4/2012          Health Care Home 05/03/2012     Priority: Medium     Ashley Kilpatrick RN-PHN  FPA / UZMA [facility]   [phone]     DX V65.8 REPLACED WITH [id number] HEALTH CARE HOME (04/08/2013)       CARDIOVASCULAR SCREENING; LDL GOAL LESS THAN 160 10/31/2010     Priority: Medium     Atrial fibrillation (H) 12/05/2005     Priority: Medium        Past Medical " History:    Past Medical History:   Diagnosis Date     Contact dermatitis and other eczema, due to unspecified cause      Essential hypertension, benign 6/27/2016     Inguinal hernia without mention of obstruction or gangrene, unilateral or unspecified, (not specified as recurrent)      Injury, other and unspecified, unspecified site      Measles without mention of complication      Mumps without mention of complication      Pneumonia in whooping cough(484.3)      Varicella without mention of complication        Past Surgical History:    Past Surgical History:   Procedure Laterality Date     CATARACT IOL, RT/LT  7/27/2006    Right eye     HC REMV CATARACT EXTRACAP,INSERT LENS, W/O ECP  08/24/06    lEFT EYE     HC REPAIR ING HERNIA,5+Y/O,REDUCIBL  1993       Family History:    Family History   Problem Relation Age of Onset     Cancer Father         Liver     Cancer Paternal Grandfather      Respiratory Brother         Pneumonia       Social History:  Marital Status:   [2]  Social History     Tobacco Use     Smoking status: Never Smoker     Smokeless tobacco: Never Used   Vaping Use     Vaping Use: Never used   Substance Use Topics     Alcohol use: Not Currently     Comment: Moderately     Drug use: No        Medications:    azithromycin (ZITHROMAX Z-SHADE) 250 MG tablet  oxyCODONE (ROXICODONE) 5 MG tablet  albuterol (PROAIR HFA, PROVENTIL HFA, VENTOLIN HFA) 108 (90 BASE) MCG/ACT inhaler  diclofenac (VOLTAREN) 1 % topical gel  diltiazem (CARDIZEM) 30 MG tablet  gabapentin (NEURONTIN) 100 MG capsule  hydrocortisone (CORTAID) 1 % external cream  ipratropium - albuterol 0.5 mg/2.5 mg/3 mL (DUONEB) 0.5-2.5 (3) MG/3ML neb solution  order for DME  warfarin ANTICOAGULANT (COUMADIN) 2.5 MG tablet          Review of Systems   All other ROS reviewed and are negative or non-contributory except as stated in HPI.     Physical Exam   BP: (!) 141/92  Pulse: 107  Temp: 97.5  F (36.4  C)  Resp: 18  Weight: 76.2 kg (168 lb)  SpO2:  95 %      Physical Exam  Vitals and nursing note reviewed.   Constitutional:       Appearance: Normal appearance. He is normal weight.      Comments: Elderly gentleman sitting in the bed   HENT:      Head: Normocephalic.      Mouth/Throat:      Comments: Tacky mucous membranes  Eyes:      Extraocular Movements: Extraocular movements intact.      Conjunctiva/sclera: Conjunctivae normal.   Cardiovascular:      Rate and Rhythm: Tachycardia present. Rhythm irregular.      Heart sounds: Normal heart sounds.      Comments: Normal radial pulses  Pulmonary:      Effort: Pulmonary effort is normal.      Breath sounds: Normal breath sounds.   Abdominal:      Palpations: Abdomen is soft.      Tenderness: There is no abdominal tenderness.   Musculoskeletal:      Cervical back: Normal range of motion and neck supple.      Comments: No obvious extremity deformity.  Pain with range of motion of the right shoulder.  Swelling, ecchymosis over anterior right chest wall over medial clavicle/sternum area.   Skin:     General: Skin is warm and dry.   Neurological:      General: No focal deficit present.      Mental Status: He is alert.   Psychiatric:         Mood and Affect: Mood normal.         Behavior: Behavior normal.         ED Course (with Medical Decision Making)    Pt seen and examined by me.  RN and EPIC notes reviewed.       Patient with fall at home, obvious pain and injury over the anterior right side proximal chest wall/clavicle.  Because of his Coumadin, fall, age, possible head injury I am going to scan his head, neck, chest.  He is mildly tachycardic.  We will continue to monitor but he does look dry and will likely give him some fluids.  Check INR.    INR mildly elevated at 3.29.    Glucose mildly elevated at 169, other electrolytes normal.  CBC shows a hemoglobin at 10.2 but this is consistent with previous.  Normal white count.    CT scans: Head CT without any acute intracranial abnormalities.  C-spine CT with  degenerative changes as noted below, no acute abnormalities or fractures.  Chest CT shows an acute mildly comminuted and displaced right clavicular head fracture with surrounding hematoma.  He had multifocal groundglass and patchy opacities in the lungs suspicious for atypical pneumonia/COVID-19.  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    Covid swab was done.  This was negative.    I discussed the findings with patient and his son.  Patient is very restless and notes that his nerves are acting up.  He seems to have restless legs.  He usually is able to get up and walk around a lot when he has the symptoms.  I did give him a little Zyprexa and some Tylenol.  He is very anxious to return home.  There is question on if he might have a urinary tract infection, and urine was finally obtained.  Urine shows small amount of ketones, red cells, white cells, no bacteria.    Because of the atypical findings on the chest x-ray I plan to send patient home with antibiotics.  I did accidentally neglected to write for the antibiotics at discharge but likely has family double checked and an Rx for Zithromax was sent to the pharmacy.  He is using Voltaren cream over the area of discomfort and he can continue this.  Also okay to use ice or heat or Tylenol.  Of note I did do a right shoulder x-ray that did not show any fracture as he had pain with movement of the right shoulder.  He was also given a small amount of oxycodone to use if needed for severe pain.    I spoke with pharmacy and they made recommendations for his Coumadin dosing.  He should have a recheck of INR next week midweek.    I would like him to follow-up with his primary care provider for recheck.  I will send him a message about the ED visit.  If he has any significant worsening, changes or concerns return promptly at any time.      Procedures      Results for orders  placed or performed during the hospital encounter of 03/11/22 (from the past 24 hour(s))   CBC with platelets differential    Narrative    The following orders were created for panel order CBC with platelets differential.  Procedure                               Abnormality         Status                     ---------                               -----------         ------                     CBC with platelets and d...[[id number]]  Abnormal            Final result                 Please view results for these tests on the individual orders.   INR   Result Value Ref Range    INR 3.29 (H) 0.85 - 1.15   Basic metabolic panel   Result Value Ref Range    Sodium 135 133 - 144 mmol/L    Potassium 3.7 3.4 - 5.3 mmol/L    Chloride 106 94 - 109 mmol/L    Carbon Dioxide (CO2) 22 20 - 32 mmol/L    Anion Gap 7 3 - 14 mmol/L    Urea Nitrogen 25 7 - 30 mg/dL    Creatinine 1.21 0.66 - 1.25 mg/dL    Calcium 8.5 8.5 - 10.1 mg/dL    Glucose 169 (H) 70 - 99 mg/dL    GFR Estimate 57 (L) >60 mL/min/1.73m2   CBC with platelets and differential   Result Value Ref Range    WBC Count 7.3 4.0 - 11.0 10e3/uL    RBC Count 3.63 (L) 4.40 - 5.90 10e6/uL    Hemoglobin 10.2 (L) 13.3 - 17.7 g/dL    Hematocrit 30.9 (L) 40.0 - 53.0 %    MCV 85 78 - 100 fL    MCH 28.1 26.5 - 33.0 pg    MCHC 33.0 31.5 - 36.5 g/dL    RDW 15.2 (H) 10.0 - 15.0 %    Platelet Count 260 150 - 450 10e3/uL    % Neutrophils 79 %    % Lymphocytes 11 %    % Monocytes 8 %    % Eosinophils 1 %    % Basophils 1 %    % Immature Granulocytes 0 %    NRBCs per 100 WBC 0 <1 /100    Absolute Neutrophils 5.7 1.6 - 8.3 10e3/uL    Absolute Lymphocytes 0.8 0.8 - 5.3 10e3/uL    Absolute Monocytes 0.6 0.0 - 1.3 10e3/uL    Absolute Eosinophils 0.1 0.0 - 0.7 10e3/uL    Absolute Basophils 0.1 0.0 - 0.2 10e3/uL    Absolute Immature Granulocytes 0.0 <=0.4 10e3/uL    Absolute NRBCs 0.0 10e3/uL   Head CT w/o contrast    Narrative    CT OF THE HEAD WITHOUT CONTRAST 3/11/2022 8:16 AM     COMPARISON:  None.    HISTORY:  Fall, [facility], on coumadin.    TECHNIQUE: 5 mm thick axial CT images of the head were acquired  without IV contrast material.    FINDINGS:  There is moderate diffuse cerebral volume loss. There are  subtle patchy areas of decreased density in the cerebral white matter  bilaterally that are consistent with sequela of chronic small vessel  ischemic disease.     The ventricles and basal cisterns are within normal limits in  configuration given the degree of cerebral volume loss.  There is no  midline shift. There are no extra-axial fluid collections.     No intracranial hemorrhage, mass or recent infarct.    The visualized paranasal sinuses are well-aerated. There is no  mastoiditis. There are no fractures of the visualized bones.       Impression    IMPRESSION:  Diffuse cerebral volume loss and cerebral white matter  changes consistent with chronic small vessel ischemic disease. No  evidence for acute intracranial pathology.      Radiation dose for this scan was reduced using automated exposure  control, adjustment of the mA and/or kV according to patient size, or  iterative reconstruction technique.    BLAS MOSER MD         SYSTEM ID:  [id number]   Cervical spine CT w/o contrast    Narrative    CT OF THE CERVICAL SPINE WITHOUT CONTRAST   3/11/2022 8:17 AM     COMPARISON: None.    HISTORY: Fall, anterior neck pain.     TECHNIQUE: Axial images of the cervical spine were acquired without  intravenous contrast. Multiplanar reformations were created.        Impression    IMPRESSION: Moderate degenerative anterolisthesis of C4 upon C5. Mild  degenerative anterolisthesis of C6 upon C7 and C7 upon T1. Alignment  otherwise normal. Vertebral body heights are normal. Craniocervical  alignment normal. No fractures. Probable degenerative ankylosis  between the C2, C3 and C4 vertebral bodies. Loss of disc space height  and degenerative endplate spurring at C5-C6 and C6-C7. Moderate-severe  facet arthropathy  throughout the cervical spine. No spinal canal  stenosis. No prevertebral soft tissue swelling.      Radiation dose for this scan was reduced using automated exposure  control, adjustment of the mA and/or kV according to patient size, or  iterative reconstruction technique.    BLAS MOSER MD         SYSTEM ID:  [id number]   Chest CT w/o contrast    Narrative    CT CHEST W/O CONTRAST 3/11/2022 8:18 AM    CLINICAL HISTORY: fall on coumadin; anterior chest injury (?  clavicle/sternum)    TECHNIQUE: CT chest without IV contrast. Multiplanar reformats were  obtained. Dose reduction techniques were used.  CONTRAST: None.    COMPARISON: None.    FINDINGS:   LUNGS AND PLEURA: Scattered mild groundglass and patchy airspace  opacities predominantly in the lung bases. This is superimposed on  mild bronchiectasis and fibrosis in the lung bases. No pleural  effusion or pneumothorax. A few calcified granulomas.    MEDIASTINUM/AXILLAE: Mild mediastinal lymphadenopathy. For example,  there is a 1.6 cm subcarinal lymph node (series 3, image 76), a 1.5 cm  prevascular space lymph node (series 3, image 42) and a 1.2 cm left  paratracheal lymph node (series 3, image 57). Few calcified lymph  nodes, likely the sequela of prior granulomatous disease. Mild  cardiomegaly with biatrial cardiac enlargement. Mildly aneurysmal  ascending thoracic aorta measuring 4.5 cm. Moderate coronary artery  calcifications. No pericardial effusion. Small hiatal hernia.    UPPER ABDOMEN: Few punctate calcified splenic granulomas.    MUSCULOSKELETAL: Mildly displaced and comminuted acute fracture of the  right leg radicular head. Ill-defined soft tissue thickening  surrounding the fracture in the right upper neck, likely a diffuse  hematoma. Bilateral old healed rib fractures. Mild degenerative  changes in the spine and shoulders. No suspicious lesions in the  bones.      Impression    IMPRESSION:   1.  Acute mildly comminuted and displaced right clavicular  head  fracture with surrounding diffuse soft tissue thickening, likely a  diffuse hematoma.  2.  Multifocal groundglass and patchy opacities in the lungs,  suspicious for atypical pneumonia. COVID-19 pneumonia can have this  appearance. A follow-up CT in about 3 months should be considered to  ensure resolution.  3.  Cardiomegaly with mild biatrial cardiac enlargement.  4.  Mildly aneurysmal ascending thoracic aorta measuring 4.5 cm.  5.  Multiple mildly enlarged mediastinal lymph nodes are nonspecific,  likely reactive.    SCAR LAGUERRE MD         SYSTEM ID:  [id number]   XR Shoulder Right G/E 3 Views    Narrative    EXAM: RIGHT SHOULDER THREE OR MORE VIEWS  DATE/TIME: 3/11/2022 8:57 AM    INDICATION: Right shoulder pain. Fall.  COMPARISON: None available.      Impression    IMPRESSION: Anatomic alignment right shoulder. No acute displaced  right shoulder fracture. 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 Diffuse bone  demineralization. Focal infiltrate right lung base concerning for  infectious etiology. Follow-up suggested to ensure resolution. Subtle  right clavicular head fracture near the sternoclavicular joint.  Degenerative change cervicothoracic spine.     EMI SINGLETON MD         SYSTEM ID:  [id number]   Symptomatic; Unknown Influenza A/B & SARS-CoV2 (COVID-19) Virus PCR Multiplex Nose    Specimen: Nose; Swab   Result Value Ref Range    Influenza A PCR Negative Negative    Influenza B PCR Negative Negative    SARS CoV2 PCR Negative Negative    Narrative    Testing was performed using the sophie SARS-CoV-2 & Influenza A/B Assay on the sophie [employer] System. This test should be ordered for the detection of SARS-CoV-2 and influenza viruses in individuals who meet clinical and/or epidemiological criteria. Test performance is unknown in asymptomatic patients. This test is for in vitro diagnostic use under the FDA EUA for laboratories certified under CLIA to perform  moderate and/or high complexity testing. This test has not been FDA cleared or approved. A negative result does not rule out the presence of PCR inhibitors in the specimen or target RNA in concentration below the limit of detection for the assay. If only one viral target is positive but coinfection with multiple targets is suspected, the sample should be re-tested with another FDA cleared, approved or authorized test, if coinfection would change clinical management. [facility] are certified under the Clinical Laboratory Improvement Amendments of 1988 (CLIA-88) as  qualified to perform moderate and/or high complexity laboratory testing.   UA with Microscopic reflex to Culture    Specimen: Urine, Clean Catch   Result Value Ref Range    Color Urine Yellow Colorless, Straw, Light Yellow, Yellow    Appearance Urine Clear Clear    Glucose Urine Negative Negative mg/dL    Bilirubin Urine Negative Negative    Ketones Urine 5  (A) Negative mg/dL    Specific Gravity Urine 1.026 1.003 - 1.035    Blood Urine Small (A) Negative    pH Urine 5.0 5.0 - 7.0    Protein Albumin Urine 30  (A) Negative mg/dL    Urobilinogen Urine Normal Normal, 2.0 mg/dL    Nitrite Urine Negative Negative    Leukocyte Esterase Urine Small (A) Negative    Mucus Urine Present (A) None Seen /LPF    RBC Urine 6 (H) <=2 /HPF    WBC Urine 9 (H) <=5 /HPF    Hyaline Casts Urine 1 <=2 /LPF    Narrative    Urine Culture not indicated       Medications   0.9% sodium chloride BOLUS (0 mLs Intravenous Stopped 3/11/22 1004)   acetaminophen (TYLENOL) tablet 1,000 mg (1,000 mg Oral Given 3/11/22 0910)   OLANZapine zydis (zyPREXA) ODT tab 5 mg (5 mg Oral Given 3/11/22 0938)       Assessments & Plan     I have reviewed the findings, diagnosis, plan and need for follow up with the patient/son    Discharge Medication List as of 3/11/2022 11:26 AM          Final diagnoses:   Atypical pneumonia   Closed displaced fracture of right clavicle, unspecified  part of clavicle, initial encounter - sternal end   Fall in home, initial encounter   Elevated INR     Disposition: Patient discharged home in stable condition.  Plan as above.  Return for concerns.     Note: Chart documentation done in part with Dragon Voice Recognition software. Although reviewed after completion, some word and grammatical errors may remain.     3/11/2022   [facility] EMERGENCY DEPT     Jenise Ybarra MD  03/12/22 0015

## 2022-03-11 NOTE — ED NOTES
Patient restless and agitated.  Wanting to  room.  Concern for fall as patient came in for a fall.  Reorienting and redirecting patient not working.  Provider notified.  Son present in room.

## 2022-03-11 NOTE — DISCHARGE INSTRUCTIONS
Pharmacy recommends holding Coumadin/warfarin today and then take 5 mg daily until recheck next week on Wednesday or Thursday.    Tylenol if needed for pain.  Ice or heat over painful area.    The CT scan showed some atypical infiltrate in the lungs.  I am going to start antibiotics for possible atypical pneumonia.  Take as prescribed.    Please make an appointment for recheck next week of both INR and the fracture.    Return at anytime for worsening, changes or concerns.    I hope that you heal quickly and have a wonderful birthday!!!

## 2022-03-16 ENCOUNTER — TELEPHONE (OUTPATIENT)
Dept: ANTICOAGULATION | Facility: CLINIC | Age: 87
End: 2022-03-16
Payer: COMMERCIAL

## 2022-03-16 ENCOUNTER — ANTICOAGULATION THERAPY VISIT (OUTPATIENT)
Dept: ANTICOAGULATION | Facility: CLINIC | Age: 87
End: 2022-03-16

## 2022-03-16 ENCOUNTER — LAB (OUTPATIENT)
Dept: LAB | Facility: CLINIC | Age: 87
End: 2022-03-16
Payer: COMMERCIAL

## 2022-03-16 DIAGNOSIS — Z79.01 LONG TERM CURRENT USE OF ANTICOAGULANT THERAPY: ICD-10-CM

## 2022-03-16 DIAGNOSIS — I48.91 ATRIAL FIBRILLATION, UNSPECIFIED TYPE (H): ICD-10-CM

## 2022-03-16 DIAGNOSIS — I48.91 ATRIAL FIBRILLATION (H): Primary | ICD-10-CM

## 2022-03-16 DIAGNOSIS — I48.91 ATRIAL FIBRILLATION (H): ICD-10-CM

## 2022-03-16 DIAGNOSIS — I48.19 PERSISTENT ATRIAL FIBRILLATION (H): ICD-10-CM

## 2022-03-16 LAB — INR BLD: 3.7 (ref 0.9–1.1)

## 2022-03-16 PROCEDURE — 36416 COLLJ CAPILLARY BLOOD SPEC: CPT

## 2022-03-16 PROCEDURE — 85610 PROTHROMBIN TIME: CPT

## 2022-03-16 NOTE — TELEPHONE ENCOUNTER
ANTICOAGULATION     Jamal Francis is overdue for INR check.      Left message for patient to call and schedule lab appointment as soon as possible. If returning call, please schedule.     Marilee Feliciano RN

## 2022-03-16 NOTE — PROGRESS NOTES
ANTICOAGULATION MANAGEMENT     Jamal Francis 89 year old male is on warfarin with supratherapeutic INR result. (Goal INR 2.0-3.0)    Recent labs: (last 7 days)     03/16/22  1545   INR 3.7*       ASSESSMENT       Source(s): Chart Review and Patient/Caregiver Call       Warfarin doses taken: Reviewed in chart    Diet: No new diet changes identified    New illness, injury, or hospitalization: Yes: ED visit 3/11 for pneumonia and clavicle fx    Medication/supplement changes: Z-guanako stopped on today which has potential for interaction; increasing INR    Signs or symptoms of bleeding or clotting: No    Previous INR: Supratherapeutic    Additional findings: None       PLAN     Recommended plan for temporary change(s) affecting INR     Dosing Instructions: Hold 2 partial doses then continue your current warfarin dose with next INR in 5 days       Summary  As of 3/16/2022    Full warfarin instructions:  3/16: 2.5 mg; 3/17: 5 mg; Otherwise 5 mg every Mon; 7.5 mg all other days   Next INR check:  3/21/2022             Telephone call with  granddaughter Amparo who verbalizes understanding and agrees to plan and who agrees to plan and repeated back plan correctly    Lab visit scheduled    Education provided: Please call back if any changes to your diet, medications or how you've been taking warfarin, Goal range and significance of current result, Importance of therapeutic range, Importance of following up at instructed interval, Importance of taking warfarin as instructed and Monitoring for bleeding signs and symptoms    Plan made per [facility] anticoagulation protocol    Marilee Feliciano RN  Anticoagulation Clinic  3/16/2022    _______________________________________________________________________     Anticoagulation Episode Summary     Current INR goal:  2.0-3.0   TTR:  27.2 % (1 y)   Target end date:  Indefinite   Send INR reminders to:  [facility]    Indications    Atrial fibrillation (H) [I48.91]  Long-term (current)  use of anticoagulants [Z79.01] [Z79.01]  Atrial fibrillation  unspecified type (H) [I48.91]  Persistent atrial fibrillation (H) [I48.19]           Comments:           Anticoagulation Care Providers     Provider Role Specialty Phone number    Catarino Landrum DO Referring Internal Medicine [phone]    Vitor Alvarenga PA-C Referring Internal Medicine [phone]

## 2022-03-19 ENCOUNTER — MEDICAL CORRESPONDENCE (OUTPATIENT)
Dept: HEALTH INFORMATION MANAGEMENT | Facility: CLINIC | Age: 87
End: 2022-03-19
Payer: COMMERCIAL

## 2022-03-21 ENCOUNTER — OFFICE VISIT (OUTPATIENT)
Dept: FAMILY MEDICINE | Facility: CLINIC | Age: 87
End: 2022-03-21
Payer: COMMERCIAL

## 2022-03-21 ENCOUNTER — ANTICOAGULATION THERAPY VISIT (OUTPATIENT)
Dept: ANTICOAGULATION | Facility: CLINIC | Age: 87
End: 2022-03-21

## 2022-03-21 ENCOUNTER — LAB (OUTPATIENT)
Dept: LAB | Facility: CLINIC | Age: 87
End: 2022-03-21
Payer: COMMERCIAL

## 2022-03-21 VITALS
HEART RATE: 106 BPM | HEIGHT: 64 IN | WEIGHT: 172 LBS | OXYGEN SATURATION: 96 % | SYSTOLIC BLOOD PRESSURE: 148 MMHG | BODY MASS INDEX: 29.37 KG/M2 | TEMPERATURE: 98.1 F | DIASTOLIC BLOOD PRESSURE: 92 MMHG

## 2022-03-21 DIAGNOSIS — M70.22 OLECRANON BURSITIS OF LEFT ELBOW: ICD-10-CM

## 2022-03-21 DIAGNOSIS — R60.0 PERIPHERAL EDEMA: ICD-10-CM

## 2022-03-21 DIAGNOSIS — I10 ESSENTIAL HYPERTENSION, BENIGN: ICD-10-CM

## 2022-03-21 DIAGNOSIS — D64.9 ANEMIA, UNSPECIFIED TYPE: ICD-10-CM

## 2022-03-21 DIAGNOSIS — I48.91 ATRIAL FIBRILLATION (H): Primary | ICD-10-CM

## 2022-03-21 DIAGNOSIS — J43.9 PULMONARY EMPHYSEMA, UNSPECIFIED EMPHYSEMA TYPE (H): ICD-10-CM

## 2022-03-21 DIAGNOSIS — Z79.01 LONG TERM CURRENT USE OF ANTICOAGULANT THERAPY: ICD-10-CM

## 2022-03-21 DIAGNOSIS — R41.3 MEMORY LOSS: ICD-10-CM

## 2022-03-21 DIAGNOSIS — I48.91 ATRIAL FIBRILLATION, UNSPECIFIED TYPE (H): ICD-10-CM

## 2022-03-21 DIAGNOSIS — I48.91 ATRIAL FIBRILLATION (H): ICD-10-CM

## 2022-03-21 DIAGNOSIS — R79.9 ABNORMAL FINDING OF BLOOD CHEMISTRY, UNSPECIFIED: ICD-10-CM

## 2022-03-21 DIAGNOSIS — I48.20 CHRONIC ATRIAL FIBRILLATION (H): Primary | ICD-10-CM

## 2022-03-21 DIAGNOSIS — Z00.00 ENCOUNTER FOR MEDICARE ANNUAL WELLNESS EXAM: ICD-10-CM

## 2022-03-21 DIAGNOSIS — I48.19 PERSISTENT ATRIAL FIBRILLATION (H): ICD-10-CM

## 2022-03-21 DIAGNOSIS — R06.02 SOB (SHORTNESS OF BREATH): ICD-10-CM

## 2022-03-21 LAB
ERYTHROCYTE [DISTWIDTH] IN BLOOD BY AUTOMATED COUNT: 17.1 % (ref 10–15)
FERRITIN SERPL-MCNC: 89 NG/ML (ref 26–388)
FOLATE SERPL-MCNC: 38.7 NG/ML
HBA1C MFR BLD: 5.2 % (ref 0–5.6)
HCT VFR BLD AUTO: 31.6 % (ref 40–53)
HGB BLD-MCNC: 9.9 G/DL (ref 13.3–17.7)
INR BLD: 2.7 (ref 0.9–1.1)
IRON SATN MFR SERPL: 14 % (ref 15–46)
IRON SERPL-MCNC: 43 UG/DL (ref 35–180)
MCH RBC QN AUTO: 28 PG (ref 26.5–33)
MCHC RBC AUTO-ENTMCNC: 31.3 G/DL (ref 31.5–36.5)
MCV RBC AUTO: 89 FL (ref 78–100)
NT-PROBNP SERPL-MCNC: 5765 PG/ML (ref 0–450)
PLATELET # BLD AUTO: 323 10E3/UL (ref 150–450)
RBC # BLD AUTO: 3.54 10E6/UL (ref 4.4–5.9)
RETICS # AUTO: 0.08 10E6/UL (ref 0.03–0.1)
RETICS/RBC NFR AUTO: 2.3 % (ref 0.5–2)
TIBC SERPL-MCNC: 307 UG/DL (ref 240–430)
VIT B12 SERPL-MCNC: 1023 PG/ML (ref 193–986)
WBC # BLD AUTO: 6.9 10E3/UL (ref 4–11)

## 2022-03-21 PROCEDURE — 83550 IRON BINDING TEST: CPT | Performed by: PHYSICIAN ASSISTANT

## 2022-03-21 PROCEDURE — 82746 ASSAY OF FOLIC ACID SERUM: CPT | Performed by: PHYSICIAN ASSISTANT

## 2022-03-21 PROCEDURE — 36416 COLLJ CAPILLARY BLOOD SPEC: CPT

## 2022-03-21 PROCEDURE — 83036 HEMOGLOBIN GLYCOSYLATED A1C: CPT | Performed by: PHYSICIAN ASSISTANT

## 2022-03-21 PROCEDURE — 85027 COMPLETE CBC AUTOMATED: CPT | Performed by: PHYSICIAN ASSISTANT

## 2022-03-21 PROCEDURE — 83880 ASSAY OF NATRIURETIC PEPTIDE: CPT | Performed by: PHYSICIAN ASSISTANT

## 2022-03-21 PROCEDURE — 99397 PER PM REEVAL EST PAT 65+ YR: CPT | Performed by: PHYSICIAN ASSISTANT

## 2022-03-21 PROCEDURE — 36415 COLL VENOUS BLD VENIPUNCTURE: CPT | Performed by: PHYSICIAN ASSISTANT

## 2022-03-21 PROCEDURE — 82607 VITAMIN B-12: CPT | Performed by: PHYSICIAN ASSISTANT

## 2022-03-21 PROCEDURE — 99214 OFFICE O/P EST MOD 30 MIN: CPT | Mod: 25 | Performed by: PHYSICIAN ASSISTANT

## 2022-03-21 PROCEDURE — 85045 AUTOMATED RETICULOCYTE COUNT: CPT | Performed by: PHYSICIAN ASSISTANT

## 2022-03-21 PROCEDURE — 82728 ASSAY OF FERRITIN: CPT | Performed by: PHYSICIAN ASSISTANT

## 2022-03-21 PROCEDURE — 85610 PROTHROMBIN TIME: CPT

## 2022-03-21 RX ORDER — FUROSEMIDE 20 MG
20 TABLET ORAL DAILY PRN
Qty: 30 TABLET | Refills: 0 | Status: SHIPPED | OUTPATIENT
Start: 2022-03-21 | End: 2022-03-24

## 2022-03-21 RX ORDER — ALBUTEROL SULFATE 90 UG/1
2 AEROSOL, METERED RESPIRATORY (INHALATION) EVERY 6 HOURS PRN
Qty: 18 G | Refills: 0 | Status: SHIPPED | OUTPATIENT
Start: 2022-03-21 | End: 2022-07-01

## 2022-03-21 ASSESSMENT — ACTIVITIES OF DAILY LIVING (ADL)
CURRENT_FUNCTION: SHOPPING REQUIRES ASSISTANCE
CURRENT_FUNCTION: PREPARING MEALS REQUIRES ASSISTANCE
CURRENT_FUNCTION: BATHING REQUIRES ASSISTANCE

## 2022-03-21 ASSESSMENT — ENCOUNTER SYMPTOMS
MYALGIAS: 1
JOINT SWELLING: 0
PALPITATIONS: 0
DIARRHEA: 0
ARTHRALGIAS: 0
PARESTHESIAS: 0
WEAKNESS: 1
FREQUENCY: 0
CONSTIPATION: 0
EYE PAIN: 0
HEMATURIA: 0
FEVER: 0
HEARTBURN: 0
HEMATOCHEZIA: 0
COUGH: 0
DYSURIA: 0
NAUSEA: 0
SHORTNESS OF BREATH: 1
CHILLS: 0
HEADACHES: 0
DIZZINESS: 0
SORE THROAT: 0
NERVOUS/ANXIOUS: 1
ABDOMINAL PAIN: 0

## 2022-03-21 ASSESSMENT — PATIENT HEALTH QUESTIONNAIRE - PHQ9
SUM OF ALL RESPONSES TO PHQ QUESTIONS 1-9: 15
SUM OF ALL RESPONSES TO PHQ QUESTIONS 1-9: 15
10. IF YOU CHECKED OFF ANY PROBLEMS, HOW DIFFICULT HAVE THESE PROBLEMS MADE IT FOR YOU TO DO YOUR WORK, TAKE CARE OF THINGS AT HOME, OR GET ALONG WITH OTHER PEOPLE: SOMEWHAT DIFFICULT

## 2022-03-21 NOTE — PROGRESS NOTES
The patient was provided with suggestions to help him develop a healthy physical lifestyle.  The patient reports that he has difficulty with activities of daily living. I have asked that the patient make a follow up appointment in  weeks where this issue will be further evaluated and addressed.  The patient was provided with written information regarding signs of hearing loss.  The patient was provided with suggestions to help him develop a healthy emotional lifestyle.  He is at risk for falling and has been provided with information to reduce the risk of falling at home.

## 2022-03-21 NOTE — PROGRESS NOTES
ANTICOAGULATION MANAGEMENT     Jamal Francis 89 year old male is on warfarin with therapeutic INR result. (Goal INR 2.0-3.0)    Recent labs: (last 7 days)     03/21/22  1156   INR 2.7*       ASSESSMENT       Source(s): Chart Review and Patient/Caregiver Call       Warfarin doses taken: Warfarin taken as instructed    Diet: No new diet changes identified    New illness, injury, or hospitalization: No    Medication/supplement changes: None noted    Signs or symptoms of bleeding or clotting: No    Previous INR: Supratherapeutic    Additional findings: None       PLAN     Recommended plan for no diet, medication or health factor changes affecting INR     Dosing Instructions: Continue your current warfarin dose with next INR in 1 week       Summary  As of 3/21/2022    Full warfarin instructions:  5 mg every Mon; 7.5 mg all other days   Next INR check:  3/29/2022             Telephone call with  Granddaughter Amparo who verbalizes understanding and agrees to plan and who agrees to plan and repeated back plan correctly    Lab visit scheduled    Education provided: Please call back if any changes to your diet, medications or how you've been taking warfarin, Goal range and significance of current result and Importance of therapeutic range    Plan made per [facility] anticoagulation protocol    Marilee Feliciano RN  Anticoagulation Clinic  3/21/2022    _______________________________________________________________________     Anticoagulation Episode Summary     Current INR goal:  2.0-3.0   TTR:  26.5 % (1 y)   Target end date:  Indefinite   Send INR reminders to:  [facility]    Indications    Atrial fibrillation (H) [I48.91]  Long-term (current) use of anticoagulants [Z79.01] [Z79.01]  Atrial fibrillation  unspecified type (H) [I48.91]  Persistent atrial fibrillation (H) [I48.19]           Comments:           Anticoagulation Care Providers     Provider Role Specialty Phone number    Catarino Landrum DO Referring  Internal Medicine [phone]    Vitor Alvarenga PA-C [facility] [phone]

## 2022-03-21 NOTE — PATIENT INSTRUCTIONS
Patient Education   Personalized Prevention Plan  You are due for the preventive services outlined below.  Your care team is available to assist you in scheduling these services.  If you have already completed any of these items, please share that information with your care team to update in your medical record.  Health Maintenance Due   Topic Date Due     COPD Action Plan  Never done     Zoster (Shingles) Vaccine (1 of 2) Never done     Diptheria Tetanus Pertussis (DTAP/TDAP/TD) Vaccine (2 - Td or Tdap) 10/27/2018     Flu Vaccine (1) 09/01/2021     Your Health Risk Assessment indicates you feel you are not in good health    A healthy lifestyle helps keep the body fit and the mind alert. It helps protect you from disease, helps you fight disease, and helps prevent chronic disease (disease that doesn't go away) from getting worse. This is important as you get older and begin to notice twinges in muscles and joints and a decline in the strength and stamina you once took for granted. A healthy lifestyle includes good healthcare, good nutrition, weight control, recreation, and regular exercise. Avoid harmful substances and do what you can to keep safe. Another part of a healthy lifestyle is stay mentally active and socially involved.    Good healthcare     Have a wellness visit every year.     If you have new symptoms, let us know right away. Don't wait until the next checkup.     Take medicines exactly as prescribed and keep your medicines in a safe place. Tell us if your medicine causes problems.   Healthy diet and weight control     Eat 3 or 4 small, nutritious, low-fat, high-fiber meals a day. Include a variety of fruits, vegetables, and whole-grain foods.     Make sure you get enough calcium in your diet. Calcium, vitamin D, and exercise help prevent osteoporosis (bone thinning).     If you live alone, try eating with others when you can. That way you get a good meal and have company while you eat it.     Try to  keep a healthy weight. If you eat more calories than your body uses for energy, it will be stored as fat and you will gain weight.     Recreation   Recreation is not limited to sports and team events. It includes any activity that provides relaxation, interest, enjoyment, and exercise. Recreation provides an outlet for physical, mental, and social energy. It can give a sense of worth and achievement. It can help you stay healthy.    Mental Exercise and Social Involvement  Mental and emotional health is as important as physical health. Keep in touch with friends and family. Stay as active as possible. Continue to learn and challenge yourself.   Things you can do to stay mentally active are:    Learn something new, like a foreign language or musical instrument.     Play SCRABBLE or do crossword puzzles. If you cannot find people to play these games with you at home, you can play them with others on your computer through the Internet.     Join a games club--anything from card games to chess or checkers or lawn bowling.     Start a new hobby.     Go back to school.     Volunteer.     Read.   Keep up with world events.  Activities of Daily Living    Your Health Risk Assessment indicates you have difficulties with activities of daily living such as housework, bathing, preparing meals, taking medication, etc. Please make a follow up appointment for us to address this issue in more detail.    Signs of Hearing Loss      Hearing much better with one ear can be a sign of hearing loss.   Hearing loss is a problem shared by many people. In fact, it is one of the most common health problems, particularly as people age. Most people age 65 and older have some hearing loss. By age 80, almost everyone does. Hearing loss often occurs slowly over the years. So you may not realize your hearing has gotten worse.  Have your hearing checked  Call your healthcare provider if you:    Have to strain to hear normal conversation    Have to  watch other people s faces very carefully to follow what they re saying    Need to ask people to repeat what they ve said    Often misunderstand what people are saying    Turn the volume of the television or radio up so high that others complain    Feel that people are mumbling when they re talking to you    Find that the effort to hear leaves you feeling tired and irritated    Notice, when using the phone, that you hear better with one ear than the other  [employer] last reviewed this educational content on 1/1/2020 2000-2021 [employer]. All rights reserved. This information is not intended as a substitute for professional medical care. Always follow your healthcare professional's instructions.        Your Health Risk Assessment indicates you feel you are not in good emotional health.    Recreation   Recreation is not limited to sports and team events. It includes any activity that provides relaxation, interest, enjoyment, and exercise. Recreation provides an outlet for physical, mental, and social energy. It can give a sense of worth and achievement. It can help you stay healthy.    Mental Exercise and Social Involvement  Mental and emotional health is as important as physical health. Keep in touch with friends and family. Stay as active as possible. Continue to learn and challenge yourself.   Things you can do to stay mentally active are:    Learn something new, like a foreign language or musical instrument.     Play SCRABBLE or do crossword puzzles. If you cannot find people to play these games with you at home, you can play them with others on your computer through the Internet.     Join a games club--anything from card games to chess or checkers or lawn bowling.     Start a new hobby.     Go back to school.     Volunteer.     Read.   Keep up with world events.    Preventing Falls at Home  A person can fall for many reasons. Older adults may fall because reaction time slows down as we age. Your  muscles and joints may get stiff, weak, or less flexible because of illness, medicines, or a physical condition.   Other health problems that make falls more likely include:     Arthritis    Dizziness or lightheadedness when you stand up (orthostatic hypotension)    History of a stroke    Dizziness    Anemia    Certain medicines taken for mental illness or to control blood pressure.    Problems with balance or gait    Bladder or urinary problems    History of falling    Changes in vision (vision impairment)    Changes in thinking skills and memory (cognitive impairment)  Injuries from a fall can include serious injuries such as broken bones, dislocated joints, internal bleeding and cuts. Injuries like these can limit your independence.   Prevention tips  To help prevent falls and fall-related injuries, follow the tips below.    Floors  To make floors safer:     Put nonskid pads under area rugs.    Remove small rugs.    Replace worn floor coverings.    Tack carpets firmly to each step on carpeted stairs. Put nonskid strips on the edges of uncarpeted stairs.    Keep floors and stairs free of clutter and cords.    Arrange furniture so there are clear pathways.    Clean up any spills right away.  Bathrooms    To make bathrooms safer:     Install grab bars in the tub or shower.    Apply nonskid strips or put a nonskid rubber mat in the tub or shower.    Sit on a bath chair to bathe.    Use bathmats with nonskid backing.  Lighting  To improve visibility in your home:      Keep a flashlight in each room. Or put a lamp next to the bed within easy reach.    Put nightlights in the bedrooms, hallways, kitchen, and bathrooms.    Make sure all stairways have good lighting.    Take your time when going up and down stairs.    Put handrails on both sides of stairs and in walkways for more support. To prevent injury to your wrist or arm, don t use handrails to pull yourself up.    Install grab bars to pull yourself up.    Move or  rearrange items that you use often. This will make them easier to find or reach.    Look at your home to find any safety hazards. Especially look at doorways, walkways, and the driveway. Remove or repair any safety problems that you find.  Other changes to make    Look around to find any safety hazards. Look closely at doorways, walkways, and the driveway. Remove or repair any safety problems that you find.    Wear shoes that fit well.    Take your time when going up and down stairs.    Put handrails on both sides of stairs and in walkways for more support. To prevent injury to your wrist or arm, don t use handrails to pull yourself up.    Install grab bars wherever needed to pull yourself up.    Arrange items that you use often. This will make them easier to find or reach.    [employer] last reviewed this educational content on 3/1/2020    2784-3983 [employer]. All rights reserved. This information is not intended as a substitute for professional medical care. Always follow your healthcare professional's instructions.

## 2022-03-21 NOTE — PROGRESS NOTES
"SUBJECTIVE:   Jamal Francis is a 89 year old male who presents for Preventive Visit.  Patient has been advised of split billing requirements and indicates understanding: Yes  Are you in the first 12 months of your Medicare coverage?  No    Healthy Habits:     In general, how would you rate your overall health?  Fair    Frequency of exercise:  6-7 days/week    Duration of exercise:  Less than 15 minutes    Do you usually eat at least 4 servings of fruit and vegetables a day, include whole grains    & fiber and avoid regularly eating high fat or \"junk\" foods?  Yes    Taking medications regularly:  Yes    Medication side effects:  None    Ability to successfully perform activities of daily living:  Shopping requires assistance, preparing meals requires assistance and bathing requires assistance    Home Safety:  No safety concerns identified    Hearing Impairment:  Need to ask people to speak up or repeat themselves and difficulty understanding speech on the telephone    In the past 6 months, have you been bothered by leaking of urine?  No    In general, how would you rate your overall mental or emotional health?  Fair      PHQ-2 Total Score: 4    Additional concerns today:  No    Do you feel safe in your environment? Yes    Have you ever done Advance Care Planning? (For example, a Health Directive, POLST, or a discussion with a medical provider or your loved ones about your wishes): Yes, advance care planning is on file.       Fall risk  Fallen 2 or more times in the past year?: Yes  Any fall with injury in the past year?: Yes  Cognitive Screening   1) Repeat 3 items (Leader, Season, Table)    2) Clock draw: ABNORMAL    3) 3 item recall: Recalls 1 object   Results: ABNORMAL clock, 1-2 items recalled: PROBABLE COGNITIVE IMPAIRMENT, **INFORM PROVIDER**    Mini-CogTM Copyright ODETTE Guajardo. Licensed by the author for use in [facility]; reprinted with permission ([email or web address]). All rights reserved.      Do you " "have sleep apnea, excessive snoring or daytime drowsiness?: no    Reviewed and updated as needed this visit by clinical staff   Tobacco  Allergies    Med Hx  Surg Hx  Fam Hx  Soc Hx        Reviewed and updated as needed this visit by Provider                 Social History     Tobacco Use     Smoking status: Never Smoker     Smokeless tobacco: Never Used   Substance Use Topics     Alcohol use: Not Currently     Comment: Moderately         Alcohol Use 3/21/2022   Prescreen: >3 drinks/day or >7 drinks/week? No   Prescreen: >3 drinks/day or >7 drinks/week? -     Patient is a 89 year old male with medical history of chronic atrial fibrillation who is brought in by his granddaughter (GONZALES) today for annual checkup. He was recently seen at the [facility] ED, 03/11, following fall overnight. GD informs me that he has been getting up every 1-2 hours due to \"nerves\". Unfortunately, he is not very stable and has been falling more frequently during these nighttime awakenings. Fortunately, he has been living at [facility] with his spouse and has had additional care there. Review of the ED summary notes that the patient had a mildly comminuted and displaced right clavicular head fracture. He is not wearing a sling at the visit today nor did the ED send him home with one. The GD did purchase one OTC, but the patient does not like to wear it. He denies pain at this time. However, his memory and cognitive functioning have been reduced and the accuracy of the history he reports is questionable.     CT scan of the chest noted groundglass and patchy opacities in the lungs. This is concerning for covid pneumonia as well as cardiomegaly. Patient completed zpak, but SOB has persisted. In addition, he has edema in lower legs bilaterally. Vitals show that he is up 4lbs over the past 2 weeks. Last echocardiogram was completed in 2011: LVH, severe left atrial dilatation, moderate to severe tricuspid regurgitation and EF of 55-60%. " Patient is managing chronic afib with low dose diltiazem, 30mg once daily. Rate is elevated today at 106. This may be due to ongoing anemia. Labs returned with BNP in excess of 5000. Will have him begin low dose furosemide to address fluid in legs and suspected fluid in lungs. He will schedule repeat echo and consult with cardiology. He has follow up with internal medicine in 2 days. I advised that they keep this appointment. GD will have [facility] staff help to monitor daily weights. Patient not on an ACE at this time. Could consider adding vs increasing diltiazem dose for rate control. Will check additional labs for cause of anemia.       ED Course (with Medical Decision Making)     Pt seen and examined by me.  RN and EPIC notes reviewed.        Patient with fall at home, obvious pain and injury over the anterior right side proximal chest wall/clavicle.  Because of his Coumadin, fall, age, possible head injury I am going to scan his head, neck, chest.  He is mildly tachycardic.  We will continue to monitor but he does look dry and will likely give him some fluids.  Check INR.     INR mildly elevated at 3.29.     Glucose mildly elevated at 169, other electrolytes normal.  CBC shows a hemoglobin at 10.2 but this is consistent with previous.  Normal white count.     CT scans: Head CT without any acute intracranial abnormalities.  C-spine CT with degenerative changes as noted below, no acute abnormalities or fractures.  Chest CT shows an acute mildly comminuted and displaced right clavicular head fracture with surrounding hematoma.  He had multifocal groundglass and patchy opacities in the lungs suspicious for atypical pneumonia/COVID-19.  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     Covid swab was done.  This was negative.     I discussed the findings with patient and his son.  Patient is very restless and notes  that his nerves are acting up.  He seems to have restless legs.  He usually is able to get up and walk around a lot when he has the symptoms.  I did give him a little Zyprexa and some Tylenol.  He is very anxious to return home.  There is question on if he might have a urinary tract infection, and urine was finally obtained.  Urine shows small amount of ketones, red cells, white cells, no bacteria.     Because of the atypical findings on the chest x-ray I plan to send patient home with antibiotics.  I did accidentally neglected to write for the antibiotics at discharge but likely has family double checked and an Rx for Zithromax was sent to the pharmacy.  He is using Voltaren cream over the area of discomfort and he can continue this.  Also okay to use ice or heat or Tylenol.  Of note I did do a right shoulder x-ray that did not show any fracture as he had pain with movement of the right shoulder.  He was also given a small amount of oxycodone to use if needed for severe pain.     I spoke with pharmacy and they made recommendations for his Coumadin dosing.  He should have a recheck of INR next week midweek.     I would like him to follow-up with his primary care provider for recheck.  I will send him a message about the ED visit.  If he has any significant worsening, changes or concerns return promptly at any time.    Current providers sharing in care for this patient include:   Patient Care Team:  Vitor Alvarenga PA-C as PCP - General (Internal Medicine)  Vitor Alvarenga PA-C as Assigned PCP    The following health maintenance items are reviewed in Epic and correct as of today:  Health Maintenance Due   Topic Date Due     ANNUAL REVIEW OF HM ORDERS  Never done     COPD ACTION PLAN  Never done     ZOSTER IMMUNIZATION (1 of 2) Never done     DTAP/TDAP/TD IMMUNIZATION (2 - Td or Tdap) 10/27/2018     INFLUENZA VACCINE (1) 09/01/2021       Review of Systems   Constitutional: Negative for chills and fever.   HENT:  "Negative for congestion, ear pain, hearing loss and sore throat.    Eyes: Negative for pain and visual disturbance.   Respiratory: Positive for shortness of breath. Negative for cough.    Cardiovascular: Positive for peripheral edema. Negative for chest pain and palpitations.   Gastrointestinal: Negative for abdominal pain, constipation, diarrhea, heartburn, hematochezia and nausea.   Genitourinary: Negative for dysuria, frequency, genital sores, hematuria, impotence, penile discharge and urgency.   Musculoskeletal: Positive for myalgias. Negative for arthralgias and joint swelling.   Skin: Negative for rash.   Neurological: Positive for weakness. Negative for dizziness, headaches and paresthesias.   Psychiatric/Behavioral: Negative for mood changes. The patient is nervous/anxious.      OBJECTIVE:   BP (!) 148/92   Pulse 106   Temp 98.1  F (36.7  C) (Temporal)   Ht 1.626 m (5' 4\")   Wt 78 kg (172 lb)   SpO2 96%   BMI 29.52 kg/m   Estimated body mass index is 29.52 kg/m  as calculated from the following:    Height as of this encounter: 1.626 m (5' 4\").    Weight as of this encounter: 78 kg (172 lb).  Physical Exam  GENERAL: healthy, alert and no distress  EYES: Eyes grossly normal to inspection, PERRL and conjunctivae and sclerae normal  HENT: ear canals and TM's normal, nose and mouth without ulcers or lesions  NECK: no adenopathy, no asymmetry, masses, or scars and thyroid normal to palpation  RESP: lungs clear to auscultation - bilateral rales and inspiratory wheezes in lower lobes  CV: regular rate and rhythm, normal S1 S2, no S3 or S4, no murmur, click or rub, no peripheral edema and peripheral pulses strong  ABDOMEN: soft, nontender, no hepatosplenomegaly, no masses and bowel sounds normal  MS: mild swelling over left posterior oelcranon, suspect bursitis   NEURO: Normal strength and tone, mentation intact and speech normal  PSYCH: mentation appears normal, affect normal/bright    Diagnostic Test " Results:  Results for orders placed or performed in visit on 03/21/22 (from the past 24 hour(s))   INR point of care   Result Value Ref Range    INR 2.7 (H) 0.9 - 1.1    Narrative    This test is intended for monitoring Coumadin therapy. Results are not accurate in patients with prolonged INR due to factor deficiency.       ASSESSMENT / PLAN:       ICD-10-CM    1. Chronic atrial fibrillation (H)  I48.20 albuterol (PROAIR HFA/PROVENTIL HFA/VENTOLIN HFA) 108 (90 Base) MCG/ACT inhaler     Hemoglobin A1c     furosemide (LASIX) 20 MG tablet     Adult Cardiology Eval  Referral     Hemoglobin A1c   2. SOB (shortness of breath)  R06.02 BNP-N terminal pro     Iron and iron binding capacity     Ferritin     furosemide (LASIX) 20 MG tablet     Echocardiogram Complete     Adult Cardiology Eval  Referral     Ferritin     Iron and iron binding capacity     BNP-N terminal pro     Reticulocyte count     Vitamin B12     Folate     Reticulocyte count     Folate     Vitamin B12   3. Peripheral edema  R60.9 furosemide (LASIX) 20 MG tablet     Echocardiogram Complete     Adult Cardiology Eval  Referral   4. Essential hypertension, benign  I10    5. Pulmonary emphysema, unspecified emphysema type (H)  J43.9 albuterol (PROAIR HFA/PROVENTIL HFA/VENTOLIN HFA) 108 (90 Base) MCG/ACT inhaler   6. Memory loss  R41.3    7. Olecranon bursitis of left elbow  M70.22    8. Anemia, unspecified type  D64.9 CBC with platelets     CBC with platelets     Reticulocyte count     Vitamin B12     Folate     Reticulocyte count     Folate     Vitamin B12   9. Abnormal finding of blood chemistry, unspecified   R79.9 Iron and iron binding capacity     Ferritin     Hemoglobin A1c     Hemoglobin A1c     Ferritin     Iron and iron binding capacity   10. Encounter for Medicare annual wellness exam  Z00.00        Patient has been advised of split billing requirements and indicates understanding: Yes    COUNSELING:  Reviewed preventive  "health counseling, as reflected in patient instructions       SeeHPI    Estimated body mass index is 29.52 kg/m  as calculated from the following:    Height as of this encounter: 1.626 m (5' 4\").    Weight as of this encounter: 78 kg (172 lb).    Weight management plan: Discussed healthy diet and exercise guidelines    He reports that he has never smoked. He has never used smokeless tobacco.      Appropriate preventive services were discussed with this patient, including applicable screening as appropriate for cardiovascular disease, diabetes, osteopenia/osteoporosis, and glaucoma.  As appropriate for age/gender, discussed screening for colorectal cancer, prostate cancer, breast cancer, and cervical cancer. Checklist reviewing preventive services available has been given to the patient.    Reviewed patients plan of care and provided an AVS. The Basic Care Plan (routine screening as documented in Health Maintenance) for Jamal meets the Care Plan requirement. This Care Plan has been established and reviewed with the Patient.    Counseling Resources:  ATP IV Guidelines  Pooled Cohorts Equation Calculator  Breast Cancer Risk Calculator  Breast Cancer: Medication to Reduce Risk  FRAX Risk Assessment  ICSI Preventive Guidelines  Dietary Guidelines for Americans, 2010  USDA's MyPlate  ASA Prophylaxis  Lung CA Screening    ESTELLE Ruvalcaba [facility]    Identified Health Risks:  "

## 2022-03-22 ASSESSMENT — PATIENT HEALTH QUESTIONNAIRE - PHQ9: SUM OF ALL RESPONSES TO PHQ QUESTIONS 1-9: 15

## 2022-03-24 ENCOUNTER — OFFICE VISIT (OUTPATIENT)
Dept: INTERNAL MEDICINE | Facility: CLINIC | Age: 87
End: 2022-03-24
Payer: COMMERCIAL

## 2022-03-24 VITALS
WEIGHT: 170.6 LBS | SYSTOLIC BLOOD PRESSURE: 132 MMHG | OXYGEN SATURATION: 97 % | DIASTOLIC BLOOD PRESSURE: 80 MMHG | RESPIRATION RATE: 18 BRPM | BODY MASS INDEX: 29.28 KG/M2 | TEMPERATURE: 98.4 F | HEART RATE: 92 BPM

## 2022-03-24 DIAGNOSIS — I48.20 CHRONIC ATRIAL FIBRILLATION (H): ICD-10-CM

## 2022-03-24 DIAGNOSIS — L03.115 CELLULITIS OF RIGHT LOWER EXTREMITY: Primary | ICD-10-CM

## 2022-03-24 DIAGNOSIS — R60.0 PERIPHERAL EDEMA: ICD-10-CM

## 2022-03-24 DIAGNOSIS — R06.02 SOB (SHORTNESS OF BREATH): ICD-10-CM

## 2022-03-24 DIAGNOSIS — F41.9 ANXIETY: ICD-10-CM

## 2022-03-24 PROCEDURE — 99214 OFFICE O/P EST MOD 30 MIN: CPT | Performed by: INTERNAL MEDICINE

## 2022-03-24 RX ORDER — CEPHALEXIN 500 MG/1
500 CAPSULE ORAL 3 TIMES DAILY
Qty: 21 CAPSULE | Refills: 0 | Status: ON HOLD | OUTPATIENT
Start: 2022-03-24 | End: 2022-03-31

## 2022-03-24 RX ORDER — MULTIPLE VITAMINS W/ MINERALS TAB 9MG-400MCG
1 TAB ORAL DAILY
COMMUNITY

## 2022-03-24 RX ORDER — FUROSEMIDE 20 MG
40 TABLET ORAL 2 TIMES DAILY
Qty: 30 TABLET | Refills: 0 | Status: ON HOLD | COMMUNITY
Start: 2022-03-24 | End: 2022-03-31

## 2022-03-24 RX ORDER — POTASSIUM CHLORIDE 750 MG/1
10 TABLET, EXTENDED RELEASE ORAL 2 TIMES DAILY
Qty: 60 TABLET | Refills: 0 | Status: SHIPPED | OUTPATIENT
Start: 2022-03-24

## 2022-03-24 RX ORDER — GABAPENTIN 300 MG/1
300 CAPSULE ORAL AT BEDTIME
Qty: 30 CAPSULE | Refills: 0 | Status: SHIPPED | OUTPATIENT
Start: 2022-03-24

## 2022-03-24 ASSESSMENT — PAIN SCALES - GENERAL: PAINLEVEL: NO PAIN (0)

## 2022-03-24 NOTE — PROGRESS NOTES
Megha Berry is a 89 year old who presents for the following health issues     HPI     ED/UC Followup:    Facility:  [facility]  Date of visit: 3/11/22  Reason for visit: fall, pneumonia, fracture   Current Status: declining         EMR reviewed including:             Complaint, History of Chief Complaint, Corresponding Review of Systems, and Complaint Specific Physical Examination.    #1   Repetitive falls.  Seen in the emergency department.  Denies loss of consciousness prior to the fall.  Has become weaker and more short of breath.        #2   Increased swelling of lower extremities with redness in the lower right leg.  Recently placed on low-dose furosemide with minimal benefit.  Has some difficulty laying down while sleeping.  Mild decrease in shortness of breath as well.  History of chronic atrial fibrillation.        Exam:  3-4+ pitting edema bilaterally  Heart irregularly irregular without rubs.  Faint systolic murmur  Dependent rales bilaterally.  No significant stridor.  Alert and appropriate but easily confused.  Vital signs are stable.            Patient has been interviewed, applicable history and applied review of systems have been performed.    Vital Signs:   /80 (BP Location: Right arm, Patient Position: Sitting, Cuff Size: Adult Regular)   Pulse 92   Temp 98.4  F (36.9  C) (Temporal)   Resp 18   Wt 77.4 kg (170 lb 9.6 oz)   SpO2 97%   BMI 29.28 kg/m        Decision Making    Problem and Complexity     1. Chronic atrial fibrillation (H)  Increase diuretic.  Add potassium  - furosemide (LASIX) 20 MG tablet; Take 2 tablets (40 mg) by mouth 2 times daily  Dispense: 30 tablet; Refill: 0  - potassium chloride ER (KLOR-CON M) 10 MEQ CR tablet; Take 1 tablet (10 mEq) by mouth 2 times daily  Dispense: 60 tablet; Refill: 0    2. SOB (shortness of breath)  As above.  Recommend elevation of the feet  - furosemide (LASIX) 20 MG tablet; Take 2 tablets (40 mg) by mouth 2 times daily   Dispense: 30 tablet; Refill: 0  - potassium chloride ER (KLOR-CON M) 10 MEQ CR tablet; Take 1 tablet (10 mEq) by mouth 2 times daily  Dispense: 60 tablet; Refill: 0    3. Peripheral edema  As above  - furosemide (LASIX) 20 MG tablet; Take 2 tablets (40 mg) by mouth 2 times daily  Dispense: 30 tablet; Refill: 0  - potassium chloride ER (KLOR-CON M) 10 MEQ CR tablet; Take 1 tablet (10 mEq) by mouth 2 times daily  Dispense: 60 tablet; Refill: 0    4. Anxiety  Patient taking gabapentin for anxiety.  Recommend taking it at bedtime and not through the day  - gabapentin (NEURONTIN) 300 MG capsule; Take 1 capsule (300 mg) by mouth At Bedtime  Dispense: 30 capsule; Refill: 0    5. Cellulitis of right lower extremity  Concerns regarding early cellulitis of leg.  Started on Keflex.  Reduce edema as possible  - cephALEXin (KEFLEX) 500 MG capsule; Take 1 capsule (500 mg) by mouth 3 times daily  Dispense: 21 capsule; Refill: 0                                FOLLOW UP   I have asked the patient to make an appointment for followup with me in 1 week virtually.        I have carefully explained the diagnosis and treatment options to the patient.  The patient has displayed an understanding of the above, and all subsequent questions were answered.      DO JENN Bentley    Portions of this note were produced using [employer]  Although every attempt at real-time proof reading has been made, occasional grammar/syntax errors may have been missed.

## 2022-03-25 ENCOUNTER — NURSE TRIAGE (OUTPATIENT)
Dept: FAMILY MEDICINE | Facility: CLINIC | Age: 87
End: 2022-03-25
Payer: COMMERCIAL

## 2022-03-25 ENCOUNTER — HOSPITAL ENCOUNTER (OUTPATIENT)
Dept: GENERAL RADIOLOGY | Facility: CLINIC | Age: 87
Discharge: HOME | End: 2022-03-25
Attending: INTERNAL MEDICINE
Payer: COMMERCIAL

## 2022-03-25 ENCOUNTER — OFFICE VISIT (OUTPATIENT)
Dept: INTERNAL MEDICINE | Facility: CLINIC | Age: 87
End: 2022-03-25
Payer: COMMERCIAL

## 2022-03-25 VITALS
RESPIRATION RATE: 22 BRPM | TEMPERATURE: 97.7 F | SYSTOLIC BLOOD PRESSURE: 138 MMHG | HEART RATE: 90 BPM | DIASTOLIC BLOOD PRESSURE: 76 MMHG | OXYGEN SATURATION: 94 %
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
## 2022-03-25 LAB
ERYTHROCYTE [DISTWIDTH] IN BLOOD BY AUTOMATED COUNT: 17.2 % (ref 10–15)
HCT VFR BLD AUTO: 30.5 % (ref 40–53)
HGB BLD-MCNC: 9.7 G/DL (ref 13.3–17.7)
MCH RBC QN AUTO: 28.4 PG (ref 26.5–33)
MCHC RBC AUTO-ENTMCNC: 31.8 G/DL (ref 31.5–36.5)
MCV RBC AUTO: 89 FL (ref 78–100)
PLATELET # BLD AUTO: 328 10E3/UL (ref 150–450)
RBC # BLD AUTO: 3.41 10E6/UL (ref 4.4–5.9)
WBC # BLD AUTO: 6.8 10E3/UL (ref 4–11)

## 2022-03-25 PROCEDURE — 73000 X-RAY EXAM OF COLLAR BONE: CPT | Mod: LT

## 2022-03-25 PROCEDURE — 99213 OFFICE O/P EST LOW 20 MIN: CPT | Performed by: INTERNAL MEDICINE

## 2022-03-25 PROCEDURE — 71101 X-RAY EXAM UNILAT RIBS/CHEST: CPT | Mod: LT

## 2022-03-25 PROCEDURE — 85027 COMPLETE CBC AUTOMATED: CPT | Performed by: INTERNAL MEDICINE

## 2022-03-25 PROCEDURE — 36415 COLL VENOUS BLD VENIPUNCTURE: CPT | Performed by: INTERNAL MEDICINE

## 2022-03-25 ASSESSMENT — PAIN SCALES - GENERAL: PAINLEVEL: NO PAIN (0)

## 2022-03-25 NOTE — TELEPHONE ENCOUNTER
Patient was seen yesterday, 3/24/22, with Dr. Landrum.  He has fallen 2 x since this visit and the assisted living staff are concerned he may have broken a rib.    Patient is scheduled with Dr. Landrum at 11:40.    Additional Information    Negative: Shock suspected (e.g., cold/pale/clammy skin, too weak to stand, low BP, rapid pulse)    Negative: Sounds like a life-threatening emergency to the triager    Negative: Bruises with fever    Negative: Tiny bruises (spots or dots) of unknown cause    Negative: Bruise(s) of forehead or head    Negative: Bruise(s) of face or jaw    Negative: Followed an injury, and triager doesn't know which injury guideline to use first    Negative: Post-operative bruising    Negative: Dizziness or lightheadedness    Negative: [1] Bruise on head/face, chest, or abdomen AND [2]  taking Coumadin (warfarin) or other strong blood thinner, or known bleeding disorder (e.g., thrombocytopenia)    Negative: Unexplained bleeding from another site (e.g., gums, nose, urine) as well    Negative: Patient sounds very sick or weak to the triager    Protocols used: BRUISES-A-AH

## 2022-03-25 NOTE — PROGRESS NOTES
Megha Berry is a 89 year old who presents for the following health issues        Chief Complaint   Patient presents with     Fall     fell out of bed this morning, possible rib fracture        EMR reviewed including:             Complaint, History of Chief Complaint, Corresponding Review of Systems, and Complaint Specific Physical Examination.    #1      Recent fall out of bed  Minimal trauma.  Complains of pain in the left upper chest and clavicular area  Family concerned regarding the possibility of fracture.  No dyspnea or difficulty breathing.  No evidence of flail chest.  No subcutaneous air.  Lungs are clear to auscultation in all fields.    #2   Ongoing lower extremity edema.  Was seen yesterday for this.  Please see that note.  No significant change noted        Patient has been interviewed, applicable history and applied review of systems have been performed.    Vital Signs:   /76 (BP Location: Right arm, Patient Position: Sitting, Cuff Size: Adult Large)   Pulse 90   Temp 97.7  F (36.5  C) (Temporal)   Resp 22   SpO2 94%       Decision Making    Problem and Complexity     1. Fall, initial encounter  We will obtain x-ray of the clavicle and chest to rule out possible fracture.  Patient has been having frequent falls and these have not been associated with loss of consciousness.  Discussed potential extended care placement in the future.  - XR Ribs & Chest Left G/E 3 Views; Future  - CBC with platelets; Future  - XR Clavicle Left; Future  - CBC with platelets                                FOLLOW UP   I have asked the patient to make an appointment for followup with me in 1 to 2 weeks as previously recommended        I have carefully explained the diagnosis and treatment options to the patient.  The patient has displayed an understanding of the above, and all subsequent questions were answered.      DO JENN Bentley    Portions of this note were produced using [employer]  Although every attempt at real-time proof reading has been made, occasional grammar/syntax errors may have been missed.

## 2022-03-28 ENCOUNTER — NURSE TRIAGE (OUTPATIENT)
Dept: NURSING | Facility: CLINIC | Age: 87
End: 2022-03-28
Payer: COMMERCIAL

## 2022-03-28 NOTE — TELEPHONE ENCOUNTER
"Pt's granddaughter Amparo calling about pt's cellulitis. Taking Cipro and Lasix. Swelling has not improved and erythema has worsened per Amparo. Amparo reports pt's legs \"still really swollen, now one of them red and raw\". Redness on right leg \"kind of higher up on shin, probably about size of my hand\". Amparo denies there is a red streak running from the erythema or a fever. Amparo states [facility] nurses are concerned. Amparo would like Dr. Landrum to see pt for consistency. Amparo is wondering if pt can be seen in clinic tomorrow.     Advised Amparo message will be sent to clinic for PCP or covering to advise regarding OV. Call back if fever or worsening symptoms such as red line from erythema occurs prior to being seen.    Amprao verbalizes understanding and agrees to plan.      Reason for Disposition    [1] Taking antibiotic > 24 hours AND [2] cellulitis symptoms are WORSE (e.g., spreading redness, pain, swelling)    Additional Information    Negative: Shock suspected (e.g., cold/pale/clammy skin, too weak to stand, low BP, rapid pulse)    Negative: Sounds like a life-threatening emergency to the triager    Negative:  surgical wound infection suspected (post-op)    Negative: Surgical wound infection suspected (post-op)    Negative: [1] Widespread rash AND [2] drug rash suspected (i.e., allergic reaction to antibiotic)    Negative: Animal bite wound infection suspected    Negative: SEVERE pain    Negative: [1] SEVERE pain with bending of finger (or toe) AND [2] cellulitis on hand (or foot)    Negative: Fever > 104 F (40 C)    Negative: [1] Widespread rash AND [2] bright red, sunburn-like    Negative: Black (necrotic), dark purple, or blisters develop in area of cellulitis    Negative: Patient sounds very sick or weak to the triager    Negative: [1] Taking antibiotic > 24 hours AND [2] fever > 100.4 F (38.0 C)    Negative: [1] Taking antibiotic > 24 hours AND [2] red streak (or line) runs from area of " infection    Negative: [1] Fever > 100.0 F (37.8 C) AND [2] new onset    Negative: [1] Red streak runs from area of infection AND [2] new onset    Negative: [1] Caller has URGENT question AND [2] triager unable to answer question    Protocols used: CELLULITIS ON ANTIBIOTIC FOLLOW-UP CALL-A-AH

## 2022-03-29 ENCOUNTER — APPOINTMENT (OUTPATIENT)
Dept: GENERAL RADIOLOGY | Facility: CLINIC | Age: 87
DRG: 603 | End: 2022-03-29
Attending: PHYSICIAN ASSISTANT
Payer: COMMERCIAL

## 2022-03-29 ENCOUNTER — HOSPITAL ENCOUNTER (INPATIENT)
Facility: CLINIC | Age: 87
LOS: 2 days | Discharge: HOME HEALTH | DRG: 603 | End: 2022-03-31
Attending: PHYSICIAN ASSISTANT | Admitting: PEDIATRICS
Payer: COMMERCIAL

## 2022-03-29 ENCOUNTER — APPOINTMENT (OUTPATIENT)
Dept: ULTRASOUND IMAGING | Facility: CLINIC | Age: 87
DRG: 603 | End: 2022-03-29
Attending: PHYSICIAN ASSISTANT
Payer: COMMERCIAL

## 2022-03-29 ENCOUNTER — APPOINTMENT (OUTPATIENT)
Dept: LAB | Facility: CLINIC | Age: 87
End: 2022-03-29
Payer: COMMERCIAL

## 2022-03-29 DIAGNOSIS — F41.9 ANXIETY: Primary | ICD-10-CM

## 2022-03-29 DIAGNOSIS — Z76.89 HEALTH CARE HOME: ICD-10-CM

## 2022-03-29 DIAGNOSIS — I50.9 HEART FAILURE, UNSPECIFIED HF CHRONICITY, UNSPECIFIED HEART FAILURE TYPE (H): ICD-10-CM

## 2022-03-29 DIAGNOSIS — R60.0 BILATERAL LEG EDEMA: ICD-10-CM

## 2022-03-29 DIAGNOSIS — L03.115 CELLULITIS OF RIGHT LEG: ICD-10-CM

## 2022-03-29 DIAGNOSIS — M79.89 SWELLING OF LIMB: ICD-10-CM

## 2022-03-29 DIAGNOSIS — Z11.52 ENCOUNTER FOR SCREENING LABORATORY TESTING FOR SEVERE ACUTE RESPIRATORY SYNDROME CORONAVIRUS 2 (SARS-COV-2): ICD-10-CM

## 2022-03-29 DIAGNOSIS — I50.30 HEART FAILURE WITH PRESERVED EJECTION FRACTION, NYHA CLASS II (H): ICD-10-CM

## 2022-03-29 DIAGNOSIS — I50.9 CHF (CONGESTIVE HEART FAILURE) (H): ICD-10-CM

## 2022-03-29 PROBLEM — Z87.81 HISTORY OF FRACTURE OF CLAVICLE: Status: ACTIVE | Noted: 2022-03-29

## 2022-03-29 LAB
ALBUMIN SERPL-MCNC: 2.8 G/DL (ref 3.4–5)
ALP SERPL-CCNC: 124 U/L (ref 40–150)
ALT SERPL W P-5'-P-CCNC: 41 U/L (ref 0–70)
ANION GAP SERPL CALCULATED.3IONS-SCNC: 6 MMOL/L (ref 3–14)
AST SERPL W P-5'-P-CCNC: 46 U/L (ref 0–45)
BASOPHILS # BLD AUTO: 0 10E3/UL (ref 0–0.2)
BASOPHILS NFR BLD AUTO: 0 %
BILIRUB SERPL-MCNC: 1 MG/DL (ref 0.2–1.3)
BUN SERPL-MCNC: 37 MG/DL (ref 7–30)
CALCIUM SERPL-MCNC: 8.1 MG/DL (ref 8.5–10.1)
CHLORIDE BLD-SCNC: 107 MMOL/L (ref 94–109)
CO2 SERPL-SCNC: 25 MMOL/L (ref 20–32)
CREAT SERPL-MCNC: 1.29 MG/DL (ref 0.66–1.25)
CRP SERPL-MCNC: 41.6 MG/L (ref 0–8)
EOSINOPHIL # BLD AUTO: 0.1 10E3/UL (ref 0–0.7)
EOSINOPHIL NFR BLD AUTO: 2 %
ERYTHROCYTE [DISTWIDTH] IN BLOOD BY AUTOMATED COUNT: 17.1 % (ref 10–15)
ERYTHROCYTE [SEDIMENTATION RATE] IN BLOOD BY WESTERGREN METHOD: 107 MM/HR (ref 0–20)
GFR SERPL CREATININE-BSD FRML MDRD: 53 ML/MIN/1.73M2
GLUCOSE BLD-MCNC: 106 MG/DL (ref 70–99)
HCT VFR BLD AUTO: 31.1 % (ref 40–53)
HGB BLD-MCNC: 10.1 G/DL (ref 13.3–17.7)
HOLD SPECIMEN: NORMAL
IMM GRANULOCYTES # BLD: 0 10E3/UL
IMM GRANULOCYTES NFR BLD: 0 %
INR PPP: 3.31 (ref 0.85–1.15)
LYMPHOCYTES # BLD AUTO: 1.1 10E3/UL (ref 0.8–5.3)
LYMPHOCYTES NFR BLD AUTO: 16 %
MCH RBC QN AUTO: 28.9 PG (ref 26.5–33)
MCHC RBC AUTO-ENTMCNC: 32.5 G/DL (ref 31.5–36.5)
MCV RBC AUTO: 89 FL (ref 78–100)
MONOCYTES # BLD AUTO: 0.6 10E3/UL (ref 0–1.3)
MONOCYTES NFR BLD AUTO: 9 %
NEUTROPHILS # BLD AUTO: 4.9 10E3/UL (ref 1.6–8.3)
NEUTROPHILS NFR BLD AUTO: 73 %
NRBC # BLD AUTO: 0 10E3/UL
NRBC BLD AUTO-RTO: 0 /100
NT-PROBNP SERPL-MCNC: 5279 PG/ML (ref 0–1800)
PLATELET # BLD AUTO: 350 10E3/UL (ref 150–450)
POTASSIUM BLD-SCNC: 4.1 MMOL/L (ref 3.4–5.3)
PROCALCITONIN SERPL-MCNC: 0.07 NG/ML
PROT SERPL-MCNC: 7.2 G/DL (ref 6.8–8.8)
RBC # BLD AUTO: 3.5 10E6/UL (ref 4.4–5.9)
SARS-COV-2 RNA RESP QL NAA+PROBE: NEGATIVE
SODIUM SERPL-SCNC: 138 MMOL/L (ref 133–144)
WBC # BLD AUTO: 6.8 10E3/UL (ref 4–11)

## 2022-03-29 PROCEDURE — 87635 SARS-COV-2 COVID-19 AMP PRB: CPT | Performed by: PHYSICIAN ASSISTANT

## 2022-03-29 PROCEDURE — 99223 1ST HOSP IP/OBS HIGH 75: CPT | Mod: AI | Performed by: NURSE PRACTITIONER

## 2022-03-29 PROCEDURE — 250N000011 HC RX IP 250 OP 636: Performed by: PHYSICIAN ASSISTANT

## 2022-03-29 PROCEDURE — 250N000011 HC RX IP 250 OP 636: Performed by: NURSE PRACTITIONER

## 2022-03-29 PROCEDURE — 99285 EMERGENCY DEPT VISIT HI MDM: CPT | Mod: 25 | Performed by: PHYSICIAN ASSISTANT

## 2022-03-29 PROCEDURE — 84145 PROCALCITONIN (PCT): CPT | Performed by: NURSE PRACTITIONER

## 2022-03-29 PROCEDURE — 258N000003 HC RX IP 258 OP 636: Performed by: PHYSICIAN ASSISTANT

## 2022-03-29 PROCEDURE — 99285 EMERGENCY DEPT VISIT HI MDM: CPT | Performed by: PHYSICIAN ASSISTANT

## 2022-03-29 PROCEDURE — 80053 COMPREHEN METABOLIC PANEL: CPT | Performed by: PHYSICIAN ASSISTANT

## 2022-03-29 PROCEDURE — 86140 C-REACTIVE PROTEIN: CPT | Performed by: PHYSICIAN ASSISTANT

## 2022-03-29 PROCEDURE — 87040 BLOOD CULTURE FOR BACTERIA: CPT | Performed by: PHYSICIAN ASSISTANT

## 2022-03-29 PROCEDURE — 85652 RBC SED RATE AUTOMATED: CPT | Performed by: PHYSICIAN ASSISTANT

## 2022-03-29 PROCEDURE — 93970 EXTREMITY STUDY: CPT

## 2022-03-29 PROCEDURE — C9803 HOPD COVID-19 SPEC COLLECT: HCPCS | Performed by: PHYSICIAN ASSISTANT

## 2022-03-29 PROCEDURE — G0378 HOSPITAL OBSERVATION PER HR: HCPCS

## 2022-03-29 PROCEDURE — 96367 TX/PROPH/DG ADDL SEQ IV INF: CPT | Performed by: PHYSICIAN ASSISTANT

## 2022-03-29 PROCEDURE — 96375 TX/PRO/DX INJ NEW DRUG ADDON: CPT | Performed by: PHYSICIAN ASSISTANT

## 2022-03-29 PROCEDURE — 96366 THER/PROPH/DIAG IV INF ADDON: CPT | Performed by: PHYSICIAN ASSISTANT

## 2022-03-29 PROCEDURE — 85025 COMPLETE CBC W/AUTO DIFF WBC: CPT | Performed by: PHYSICIAN ASSISTANT

## 2022-03-29 PROCEDURE — 71045 X-RAY EXAM CHEST 1 VIEW: CPT

## 2022-03-29 PROCEDURE — 120N000001 HC R&B MED SURG/OB

## 2022-03-29 PROCEDURE — 250N000013 HC RX MED GY IP 250 OP 250 PS 637: Performed by: NURSE PRACTITIONER

## 2022-03-29 PROCEDURE — 83880 ASSAY OF NATRIURETIC PEPTIDE: CPT | Performed by: PHYSICIAN ASSISTANT

## 2022-03-29 PROCEDURE — 96365 THER/PROPH/DIAG IV INF INIT: CPT | Performed by: PHYSICIAN ASSISTANT

## 2022-03-29 PROCEDURE — 250N000013 HC RX MED GY IP 250 OP 250 PS 637: Performed by: PHYSICIAN ASSISTANT

## 2022-03-29 PROCEDURE — 85610 PROTHROMBIN TIME: CPT | Performed by: PHYSICIAN ASSISTANT

## 2022-03-29 PROCEDURE — 36415 COLL VENOUS BLD VENIPUNCTURE: CPT | Performed by: PHYSICIAN ASSISTANT

## 2022-03-29 RX ORDER — ONDANSETRON 2 MG/ML
4 INJECTION INTRAMUSCULAR; INTRAVENOUS EVERY 6 HOURS PRN
Status: DISCONTINUED | OUTPATIENT
Start: 2022-03-29 | End: 2022-03-31 | Stop reason: HOSPADM

## 2022-03-29 RX ORDER — ONDANSETRON 2 MG/ML
4 INJECTION INTRAMUSCULAR; INTRAVENOUS EVERY 6 HOURS PRN
Status: DISCONTINUED | OUTPATIENT
Start: 2022-03-29 | End: 2022-03-29

## 2022-03-29 RX ORDER — HYDROMORPHONE HYDROCHLORIDE 2 MG/1
2 TABLET ORAL EVERY 4 HOURS PRN
Status: DISCONTINUED | OUTPATIENT
Start: 2022-03-29 | End: 2022-03-31 | Stop reason: HOSPADM

## 2022-03-29 RX ORDER — LORAZEPAM 2 MG/ML
0.5 INJECTION INTRAMUSCULAR EVERY 6 HOURS PRN
Status: DISCONTINUED | OUTPATIENT
Start: 2022-03-29 | End: 2022-03-31 | Stop reason: HOSPADM

## 2022-03-29 RX ORDER — AMPICILLIN AND SULBACTAM 2; 1 G/1; G/1
3 INJECTION, POWDER, FOR SOLUTION INTRAMUSCULAR; INTRAVENOUS ONCE
Status: COMPLETED | OUTPATIENT
Start: 2022-03-29 | End: 2022-03-29

## 2022-03-29 RX ORDER — LORAZEPAM 2 MG/ML
0.5 INJECTION INTRAMUSCULAR EVERY 4 HOURS PRN
Status: CANCELLED | OUTPATIENT
Start: 2022-03-29

## 2022-03-29 RX ORDER — AMOXICILLIN 250 MG
1 CAPSULE ORAL 2 TIMES DAILY PRN
Status: DISCONTINUED | OUTPATIENT
Start: 2022-03-29 | End: 2022-03-31 | Stop reason: HOSPADM

## 2022-03-29 RX ORDER — GABAPENTIN 300 MG/1
300 CAPSULE ORAL AT BEDTIME
Status: DISCONTINUED | OUTPATIENT
Start: 2022-03-29 | End: 2022-03-31 | Stop reason: HOSPADM

## 2022-03-29 RX ORDER — ONDANSETRON 4 MG/1
4 TABLET, ORALLY DISINTEGRATING ORAL EVERY 6 HOURS PRN
Status: DISCONTINUED | OUTPATIENT
Start: 2022-03-29 | End: 2022-03-29

## 2022-03-29 RX ORDER — NALOXONE HYDROCHLORIDE 0.4 MG/ML
0.4 INJECTION, SOLUTION INTRAMUSCULAR; INTRAVENOUS; SUBCUTANEOUS
Status: DISCONTINUED | OUTPATIENT
Start: 2022-03-29 | End: 2022-03-31 | Stop reason: HOSPADM

## 2022-03-29 RX ORDER — ACETAMINOPHEN 325 MG/1
975 TABLET ORAL EVERY 8 HOURS
Status: DISCONTINUED | OUTPATIENT
Start: 2022-03-29 | End: 2022-03-31 | Stop reason: HOSPADM

## 2022-03-29 RX ORDER — LORAZEPAM 0.5 MG/1
0.5 TABLET ORAL EVERY 4 HOURS PRN
Status: CANCELLED | OUTPATIENT
Start: 2022-03-29

## 2022-03-29 RX ORDER — ONDANSETRON 4 MG/1
4 TABLET, ORALLY DISINTEGRATING ORAL EVERY 6 HOURS PRN
Status: DISCONTINUED | OUTPATIENT
Start: 2022-03-29 | End: 2022-03-31 | Stop reason: HOSPADM

## 2022-03-29 RX ORDER — POTASSIUM CHLORIDE 750 MG/1
20 TABLET, EXTENDED RELEASE ORAL DAILY
COMMUNITY
Start: 2022-03-24 | End: 2022-03-29

## 2022-03-29 RX ORDER — CEFAZOLIN SODIUM 1 G/50ML
1250 SOLUTION INTRAVENOUS ONCE
Status: COMPLETED | OUTPATIENT
Start: 2022-03-29 | End: 2022-03-29

## 2022-03-29 RX ORDER — FUROSEMIDE 40 MG
40 TABLET ORAL
Status: DISCONTINUED | OUTPATIENT
Start: 2022-03-30 | End: 2022-03-31 | Stop reason: HOSPADM

## 2022-03-29 RX ORDER — AMOXICILLIN 250 MG
2 CAPSULE ORAL 2 TIMES DAILY PRN
Status: DISCONTINUED | OUTPATIENT
Start: 2022-03-29 | End: 2022-03-31 | Stop reason: HOSPADM

## 2022-03-29 RX ORDER — DILTIAZEM HYDROCHLORIDE 30 MG/1
30 TABLET, FILM COATED ORAL DAILY
Status: DISCONTINUED | OUTPATIENT
Start: 2022-03-30 | End: 2022-03-31 | Stop reason: HOSPADM

## 2022-03-29 RX ORDER — FUROSEMIDE 10 MG/ML
60 INJECTION INTRAMUSCULAR; INTRAVENOUS ONCE
Status: COMPLETED | OUTPATIENT
Start: 2022-03-29 | End: 2022-03-29

## 2022-03-29 RX ORDER — LORAZEPAM 1 MG/1
1 TABLET ORAL ONCE
Status: COMPLETED | OUTPATIENT
Start: 2022-03-29 | End: 2022-03-29

## 2022-03-29 RX ORDER — NALOXONE HYDROCHLORIDE 0.4 MG/ML
0.2 INJECTION, SOLUTION INTRAMUSCULAR; INTRAVENOUS; SUBCUTANEOUS
Status: DISCONTINUED | OUTPATIENT
Start: 2022-03-29 | End: 2022-03-31 | Stop reason: HOSPADM

## 2022-03-29 RX ORDER — VANCOMYCIN HYDROCHLORIDE 1 G/200ML
1000 INJECTION, SOLUTION INTRAVENOUS EVERY 24 HOURS
Status: DISCONTINUED | OUTPATIENT
Start: 2022-03-30 | End: 2022-03-31

## 2022-03-29 RX ORDER — POTASSIUM CHLORIDE 750 MG/1
10 TABLET, EXTENDED RELEASE ORAL 2 TIMES DAILY
Status: DISCONTINUED | OUTPATIENT
Start: 2022-03-29 | End: 2022-03-31 | Stop reason: HOSPADM

## 2022-03-29 RX ORDER — AMPICILLIN AND SULBACTAM 2; 1 G/1; G/1
3 INJECTION, POWDER, FOR SOLUTION INTRAMUSCULAR; INTRAVENOUS EVERY 6 HOURS
Status: DISCONTINUED | OUTPATIENT
Start: 2022-03-29 | End: 2022-03-31

## 2022-03-29 RX ORDER — HYDROMORPHONE HYDROCHLORIDE 1 MG/ML
0.5 INJECTION, SOLUTION INTRAMUSCULAR; INTRAVENOUS; SUBCUTANEOUS
Status: DISCONTINUED | OUTPATIENT
Start: 2022-03-29 | End: 2022-03-29

## 2022-03-29 RX ADMIN — GABAPENTIN 300 MG: 300 CAPSULE ORAL at 20:33

## 2022-03-29 RX ADMIN — VANCOMYCIN HYDROCHLORIDE 1250 MG: 1 INJECTION, POWDER, LYOPHILIZED, FOR SOLUTION INTRAVENOUS at 13:05

## 2022-03-29 RX ADMIN — POTASSIUM CHLORIDE 10 MEQ: 750 TABLET, EXTENDED RELEASE ORAL at 20:34

## 2022-03-29 RX ADMIN — HYDROMORPHONE HYDROCHLORIDE 0.5 MG: 1 INJECTION, SOLUTION INTRAMUSCULAR; INTRAVENOUS; SUBCUTANEOUS at 14:49

## 2022-03-29 RX ADMIN — AMPICILLIN SODIUM AND SULBACTAM SODIUM 3 G: 2; 1 INJECTION, POWDER, FOR SOLUTION INTRAMUSCULAR; INTRAVENOUS at 20:28

## 2022-03-29 RX ADMIN — AMPICILLIN SODIUM AND SULBACTAM SODIUM 3 G: 2; 1 INJECTION, POWDER, FOR SOLUTION INTRAMUSCULAR; INTRAVENOUS at 14:56

## 2022-03-29 RX ADMIN — FUROSEMIDE 60 MG: 10 INJECTION, SOLUTION INTRAVENOUS at 15:54

## 2022-03-29 RX ADMIN — QUETIAPINE FUMARATE 12.5 MG: 25 TABLET ORAL at 20:34

## 2022-03-29 RX ADMIN — ACETAMINOPHEN 975 MG: 325 TABLET, FILM COATED ORAL at 20:34

## 2022-03-29 RX ADMIN — LORAZEPAM 1 MG: 1 TABLET ORAL at 13:18

## 2022-03-29 ASSESSMENT — ACTIVITIES OF DAILY LIVING (ADL)
TRANSFERRING: 1-->ASSISTANCE (EQUIPMENT/PERSON) NEEDED
WALKING_OR_CLIMBING_STAIRS_DIFFICULTY: YES
WALKING_OR_CLIMBING_STAIRS: AMBULATION DIFFICULTY, REQUIRES EQUIPMENT
CONCENTRATING,_REMEMBERING_OR_MAKING_DECISIONS_DIFFICULTY: YES
THE_FOLLOWING_AIDS_WERE_PROVIDED;: PATIENT DECLINED OFFER OF AUXILIARY AIDS
NUMBER_OF_TIMES_PATIENT_HAS_FALLEN_WITHIN_LAST_SIX_MONTHS: 3
CHANGE_IN_FUNCTIONAL_STATUS_SINCE_ONSET_OF_CURRENT_ILLNESS/INJURY: NO
WEAR_GLASSES_OR_BLIND: NO
HEARING_DIFFICULTY_OR_DEAF: YES
DESCRIBE_HEARING_LOSS: BILATERAL HEARING LOSS
FALL_HISTORY_WITHIN_LAST_SIX_MONTHS: YES
PATIENT'S_PREFERRED_MEANS_OF_COMMUNICATION: OTHER
ADLS_ACUITY_SCORE: 9
DIFFICULTY_EATING/SWALLOWING: NO
ADLS_ACUITY_SCORE: 6
ADLS_ACUITY_SCORE: 6
EQUIPMENT_CURRENTLY_USED_AT_HOME: WALKER, ROLLING
DRESSING/BATHING_DIFFICULTY: NO
DIFFICULTY_COMMUNICATING: NO
ADLS_ACUITY_SCORE: 6
ADLS_ACUITY_SCORE: 10
DOING_ERRANDS_INDEPENDENTLY_DIFFICULTY: NO
TOILETING_ISSUES: NO
WERE_AUXILIARY_AIDS_OFFERED?: YES

## 2022-03-29 NOTE — ED PROVIDER NOTES
History     Chief Complaint   Patient presents with     Cellulitis     Lower extremities. Right worse than left.      HPI  Jamal Francis is a 89 year old male with a history of atrial fibrillation on Coumadin, COPD, who presents to the emergency department for concerns of cellulitis.  Patient is here with his granddaughter Amparo who assists with history.  He has had lower leg swelling, right worse than left, for about 8 days now.  5 days ago, he was seen in the clinic with his primary care provider and started on Keflex for treatment of cellulitis.  He was also started on Lasix 40 mg twice daily for swelling.  The swelling and redness in his right leg seems to be getting worse.  He has not had any fevers, body aches, or appetite changes associated.  He has not been complaining of pain but when area is palpated he reports it is actually sore.  He has had difficulty lifting legs due to heaviness.  He has not had any chest pain, shortness of breath, nausea or vomiting.  No abdominal pain.  He did break his clavicle a few weeks ago falling and then also a few ribs.  He is staying in assisted living since then.       Allergies:  Allergies   Allergen Reactions     Sulfa Drugs      dizziness       Problem List:    Patient Active Problem List    Diagnosis Date Noted     CHF (congestive heart failure) (H) 03/29/2022     Priority: Medium     Bilateral leg edema 03/29/2022     Priority: Medium     Cellulitis of right leg 03/29/2022     Priority: Medium     Persistent atrial fibrillation (H) 01/22/2022     Priority: Medium     Atrial fibrillation, unspecified type (H) 02/04/2021     Priority: Medium     Essential hypertension, benign 05/20/2016     Priority: Medium     Long-term (current) use of anticoagulants [Z79.01] 03/29/2016     Priority: Medium     COPD (chronic obstructive pulmonary disease) (H) 03/17/2015     Priority: Medium     Advanced directives, counseling/discussion 10/04/2012     Priority: Medium     .Patient  [facility] has Advance Directive and will bring in a copy to clinic. 10/4/2012          Health Care Home 05/03/2012     Priority: Medium     Ashley Kilpatrick RN-PHN  [facility] / [facility]   [phone]     DX V65.8 REPLACED WITH 758[facility]ALTH CARE HOME (04/08/2013)       CARDIOVASCULAR SCREENING; LDL GOAL LESS THAN 160 10/31/2010     Priority: Medium     Atrial fibrillation (H) 12/05/2005     Priority: Medium        Past Medical History:    Past Medical History:   Diagnosis Date     Contact dermatitis and other eczema, due to unspecified cause      Essential hypertension, benign 6/27/2016     Inguinal hernia without mention of obstruction or gangrene, unilateral or unspecified, (not specified as recurrent)      Injury, other and unspecified, unspecified site      Measles without mention of complication      Mumps without mention of complication      Pneumonia in whooping cough(484.3)      Varicella without mention of complication        Past Surgical History:    Past Surgical History:   Procedure Laterality Date     CATARACT IOL, RT/LT  7/27/2006    Right eye     HC REMV CATARACT EXTRACAP,INSERT LENS, W/O ECP  08/24/06    lEFT EYE     HC REPAIR ING HERNIA,5+Y/O,REDUCIBL  1993       Family History:    Family History   Problem Relation Age of Onset     Cancer Father         Liver     Cancer Paternal Grandfather      Respiratory Brother         Pneumonia       Social History:  Marital Status:   [2]  Social History     Tobacco Use     Smoking status: Never Smoker     Smokeless tobacco: Never Used   Vaping Use     Vaping Use: Never used   Substance Use Topics     Alcohol use: Not Currently     Comment: Moderately     Drug use: No        Medications:    albuterol (PROAIR HFA/PROVENTIL HFA/VENTOLIN HFA) 108 (90 Base) MCG/ACT inhaler  cephALEXin (KEFLEX) 500 MG capsule  diltiazem (CARDIZEM) 30 MG tablet  furosemide (LASIX) 20 MG tablet  gabapentin (NEURONTIN) 300 MG capsule  multivitamin w/minerals  (MULTI-VITAMIN) tablet  potassium chloride ER (KLOR-CON M) 10 MEQ CR tablet  warfarin ANTICOAGULANT (COUMADIN) 2.5 MG tablet  diclofenac (VOLTAREN) 1 % topical gel  hydrocortisone (CORTAID) 1 % external cream  ipratropium - albuterol 0.5 mg/2.5 mg/3 mL (DUONEB) 0.5-2.5 (3) MG/3ML neb solution  order for DME  oxyCODONE (ROXICODONE) 5 MG tablet          Review of Systems   All other systems reviewed and are negative.      Physical Exam   BP: (!) 159/95  Pulse: 91  Temp: 97.5  F (36.4  C)  Resp: 20  Weight: 76.7 kg (169 lb)  SpO2: 97 %      Physical Exam  Vitals and nursing note reviewed.   Constitutional:       General: He is not in acute distress.     Appearance: Normal appearance. He is not ill-appearing, toxic-appearing or diaphoretic.   HENT:      Head: Normocephalic and atraumatic.      Mouth/Throat:      Mouth: Mucous membranes are moist.      Pharynx: Oropharynx is clear.   Eyes:      Conjunctiva/sclera: Conjunctivae normal.   Cardiovascular:      Rate and Rhythm: Normal rate. Rhythm irregular.      Heart sounds: Murmur (faint systolic) heard.   Pulmonary:      Effort: Pulmonary effort is normal. No respiratory distress.      Breath sounds: Normal breath sounds.   Abdominal:      General: Abdomen is flat. There is no distension.      Tenderness: There is no abdominal tenderness.   Musculoskeletal:      Cervical back: Neck supple.      Comments: Pitting edema noted in bilateral extremities up to the thighs, right worse than left.  Patient has large area of erythema to right medial calf that is tender to palpation.  No discrete areas of fluctuance.  No open sores present.  See photos.   Skin:     General: Skin is warm and dry.   Neurological:      General: No focal deficit present.      Mental Status: He is alert. Mental status is at baseline.   Psychiatric:         Mood and Affect: Mood normal.         Behavior: Behavior normal.                   ED Course       Procedures      Results for orders placed or  performed during the hospital encounter of 03/29/22 (from the past 24 hour(s))   CBC with platelets differential    Narrative    The following orders were created for panel order CBC with platelets differential.  Procedure                               Abnormality         Status                     ---------                               -----------         ------                     CBC with platelets and d...[[id number]]  Abnormal            Final result                 Please view results for these tests on the individual orders.   Comprehensive metabolic panel   Result Value Ref Range    Sodium 138 133 - 144 mmol/L    Potassium 4.1 3.4 - 5.3 mmol/L    Chloride 107 94 - 109 mmol/L    Carbon Dioxide (CO2) 25 20 - 32 mmol/L    Anion Gap 6 3 - 14 mmol/L    Urea Nitrogen 37 (H) 7 - 30 mg/dL    Creatinine 1.29 (H) 0.66 - 1.25 mg/dL    Calcium 8.1 (L) 8.5 - 10.1 mg/dL    Glucose 106 (H) 70 - 99 mg/dL    Alkaline Phosphatase 124 40 - 150 U/L    AST 46 (H) 0 - 45 U/L    ALT 41 0 - 70 U/L    Protein Total 7.2 6.8 - 8.8 g/dL    Albumin 2.8 (L) 3.4 - 5.0 g/dL    Bilirubin Total 1.0 0.2 - 1.3 mg/dL    GFR Estimate 53 (L) >60 mL/min/1.73m2   Nt probnp inpatient (BNP)   Result Value Ref Range    N terminal Pro BNP Inpatient 5,279 (H) 0-1,800 pg/mL   CRP inflammation   Result Value Ref Range    CRP Inflammation 41.6 (H) 0.0 - 8.0 mg/L   Erythrocyte sedimentation rate auto   Result Value Ref Range    Erythrocyte Sedimentation Rate 107 (H) 0 - 20 mm/hr   INR   Result Value Ref Range    INR 3.31 (H) 0.85 - 1.15   CBC with platelets and differential   Result Value Ref Range    WBC Count 6.8 4.0 - 11.0 10e3/uL    RBC Count 3.50 (L) 4.40 - 5.90 10e6/uL    Hemoglobin 10.1 (L) 13.3 - 17.7 g/dL    Hematocrit 31.1 (L) 40.0 - 53.0 %    MCV 89 78 - 100 fL    MCH 28.9 26.5 - 33.0 pg    MCHC 32.5 31.5 - 36.5 g/dL    RDW 17.1 (H) 10.0 - 15.0 %    Platelet Count 350 150 - 450 10e3/uL    % Neutrophils 73 %    % Lymphocytes 16 %    % Monocytes 9  %    % Eosinophils 2 %    % Basophils 0 %    % Immature Granulocytes 0 %    NRBCs per 100 WBC 0 <1 /100    Absolute Neutrophils 4.9 1.6 - 8.3 10e3/uL    Absolute Lymphocytes 1.1 0.8 - 5.3 10e3/uL    Absolute Monocytes 0.6 0.0 - 1.3 10e3/uL    Absolute Eosinophils 0.1 0.0 - 0.7 10e3/uL    Absolute Basophils 0.0 0.0 - 0.2 10e3/uL    Absolute Immature Granulocytes 0.0 <=0.4 10e3/uL    Absolute NRBCs 0.0 10e3/uL   Silvis Draw    Narrative    The following orders were created for panel order Silvis Draw.  Procedure                               Abnormality         Status                     ---------                               -----------         ------                     Extra Green Top (Lithium...[[id number]]                      Final result                 Please view results for these tests on the individual orders.   Extra Green Top (Lithium Heparin) Tube   Result Value Ref Range    Hold Specimen JIC    US Lower Extremity Venous Duplex Bilateral    Narrative    ULTRASOUND BILATERAL LOWER EXTREMITIES VENOUS DUPLEX March 29, 2022  2:52 PM    CLINICAL HISTORY: Leg swelling, rule out deep vein thrombosis. Patient  is currently taking warfarin.    TECHNIQUE: Venous Duplex ultrasound of bilateral lower extremities  with and without compression, augmentation and duplex. Color flow and  spectral Doppler with waveform analysis performed.    COMPARISON: None.    FINDINGS: Exam includes the common femoral, femoral, popliteal veins  as well as segmentally visualized deep calf veins and greater  saphenous vein.     RIGHT: No deep vein thrombosis. No superficial thrombophlebitis. No  popliteal cyst. Mild edema in the subcutaneous adipose tissues of the  lower leg.    LEFT: No deep vein thrombosis. No superficial thrombophlebitis. No  popliteal cyst. Mild edema in the subcutaneous adipose tissues of the  lower leg.    The waveforms of the venous structures in the bilateral lower  extremities are pulsatile. This indicates  increased central venous  pressure as can be seen with congestive heart failure or other  etiologies.      Impression    IMPRESSION:  1.  No deep venous thrombosis in either lower extremity.  2.  There is mild edema in the subcutaneous contains adipose tissues  of the bilateral lower legs.  3.  Waveforms in the venous structures of bilateral lower extremities  are more pulsatile than typically seen indicating increased central  venous pressure as can be seen with congestive heart failure,  pulmonary artery hypertension, pulmonary artery embolism as well as  other etiologies. Recommend clinical correlation.    I discussed the lack of deep venous thrombosis and the presence of  increased pulsatility in the waveforms with Miladys Francis on  3/29/2022 at 3:17 PM.   XR Chest Port 1 View    Narrative    CHEST ONE VIEW PORTABLE  3/29/2022 3:53 PM     HISTORY: Congestive heart failure.    COMPARISON: Chest x-rays dated 3/25/2022.    FINDINGS:  Diffusely _______ pulmonary markings bilaterally have  worsened since the prior study dated 3/25/2022. The cardiac silhouette  remains enlarged. No pneumothorax is identified. No definite pleural  fluid collection is seen, although small pleural fluid collections are  considered likely. No acute fracture or malalignment is seen. There  are moderate to severe degenerative changes of the bilateral  glenohumeral joints. Mild shift of the trachea to the left is seen in  the upper mediastinum. This was also seen on the prior CT examination  dated 3/11/2022.      Impression    IMPRESSION:  1. Findings are concerning for worsening congestive heart failure  given the increasing pulmonary interstitial markings and given the  cardiomegaly, although no definite pleural fluid collections are seen.  These findings could also represent worsening pulmonary infiltrates.  2. No other significant changes.       Medications   vancomycin (VANCOCIN) 1000 mg in dextrose 5% 200 mL PREMIX (has no  administration in time range)   HYDROmorphone (PF) (DILAUDID) injection 0.5 mg (0.5 mg Intravenous Given 3/29/22 1449)   ampicillin-sulbactam (UNASYN) 3 g vial to attach to  mL bag (0 g Intravenous Stopped 3/29/22 1530)   vancomycin (VANCOCIN) 1,250 mg in sodium chloride 0.9 % 250 mL intermittent infusion (0 mg Intravenous Stopped 3/29/22 1445)   LORazepam (ATIVAN) tablet 1 mg (1 mg Oral Given 3/29/22 1318)   furosemide (LASIX) injection 60 mg (60 mg Intravenous Given 3/29/22 1554)          Assessments & Plan (with Medical Decision Making)  Jamal Francis is a 89 year old male who presented to the ED for concerns of bilateral leg swelling and concern for infection.  He has had this leg swelling for over a week, started on Keflex and Lasix 5 days ago without improvement.  On arrival to the ED she was hypertensive but otherwise had normal vital signs, O2 saturations 97% on room air.  Exam today demonstrated irregular cardiac rhythm consistent with his known atrial fibrillation.  No acute abnormalities on pulmonary exam.  Bilateral lower extremities however demonstrated significant edema with pitting up to the thighs, right worse than left.  Right leg on medial calf also had large area of erythema and warmth with tenderness concerning for infection.  IV was established and labs were drawn for further evaluation.  Patient was given Unasyn and vancomycin for broad-spectrum coverage of this presumed leg infection, it appears he has a failed outpatient management with Keflex alone.  Labs today demonstrated a normal white count of 6.8 but CRP elevated at 41.6 and ESR was 107.  Other labs of note was a BUN of 37 and creatinine of 1.29, mildly up from baseline.  His BNP was also elevated at 5279, concerning for possible heart failure.  He was supratherapeutic with his INR at 3.31.  I did obtain an ultrasound of both of his legs to rule out DVT though clinically less likely given his Coumadin use.  There was no DVTs but  did show some pulsatility in the veins concerning for possible CHF.  We then obtained an x-ray of his chest which also showed findings concerning for CHF.  IV Lasix 60 mg was ordered and given.  I think patient will require hospital admission given failed outpatient management for leg edema and infection.  I called and spoke with Dr. Garcia, our hospitalist, who accepted patient onto his service for further management.  I spoke with Aleah, his wife, at phone number [phone] who is also agreeable to plan.  Staffed in the ED with Dr. Guan.     I have reviewed the nursing notes.    I have reviewed the findings, diagnosis, plan and need for follow up with the patient.       ED to Inpatient Handoff:    Discussed with Dr. Garcia at 1530  Patient accepted for Observation Stay  Pending studies include chest xray  Code Status: Not Addressed           New Prescriptions    No medications on file       Final diagnoses:   Cellulitis of right leg   CHF (congestive heart failure) (H)   Bilateral leg edema     Note: Chart documentation done in part with Dragon Voice Recognition software. Although reviewed after completion, some word and grammatical errors may remain.     3/29/2022   [facility] EMERGENCY DEPT     Miladys Francis PA-C  03/29/22 0330

## 2022-03-29 NOTE — PHARMACY-VANCOMYCIN DOSING SERVICE
Pharmacy Vancomycin Initial Note  Date of Service 2022  Patient's  3/18/1932  89 year old, male    Indication: Skin and Soft Tissue Infection    Current estimated CrCl = Estimated Creatinine Clearance: 35.6 mL/min (A) (based on SCr of 1.29 mg/dL (H)).    Creatinine for last 3 days  3/29/2022: 12:53 PM Creatinine 1.29 mg/dL    Recent Vancomycin Level(s) for last 3 days  No results found for requested labs within last 72 hours.      Vancomycin IV Administrations (past 72 hours)                   vancomycin (VANCOCIN) 1,250 mg in sodium chloride 0.9 % 250 mL intermittent infusion (mg) 1,250 mg New Bag 22 1305                Nephrotoxins and other renal medications (From now, onward)    Start     Dose/Rate Route Frequency Ordered Stop    22 1300  vancomycin (VANCOCIN) 1000 mg in dextrose 5% 200 mL PREMIX         1,000 mg  200 mL/hr over 1 Hours Intravenous EVERY 24 HOURS 22 1412      22 1245  vancomycin (VANCOCIN) 1,250 mg in sodium chloride 0.9 % 250 mL intermittent infusion         1,250 mg  over 90 Minutes Intravenous ONCE 22 1244      22 1240  ampicillin-sulbactam (UNASYN) 3 g vial to attach to  mL bag        Note to Pharmacy: DO NOT USE THIS FIELD FOR ADMIN INSTRUCTIONS; INFORMATION DOES NOT SHOW ON MAR. USE THE FIELD ABOVE MARKED ADMIN INSTRUCTIONS    3 g  200 mL/hr over 15-30 Minutes Intravenous ONCE 22 1239            Contrast Orders - past 72 hours (72h ago, onward)            None          [employer] Prediction of Planned Initial Vancomycin Regimen  Loading dose: 1250 mg at 12:45 2022.  Regimen: 1000 mg IV every 24 hours.  Start time: 14:11 on 2022  Exposure target: AUC24 (range)400-600 mg/L.hr   AUC24,ss: 501 mg/L.hr  Probability of AUC24 > 400: 75 %  Ctrough,ss: 16.1 mg/L  Probability of Ctrough,ss > 20: 29 %  Probability of nephrotoxicity (Lodise ISABEL ): 11 %          Plan:  1. Start vancomycin 1250 mg IV ONCE followed by vancomycin  1000 mg IV every 24 hours  2. Vancomycin monitoring method: AUC  3. Vancomycin therapeutic monitoring goal: 400-600 mg*h/L  4. Pharmacy will check vancomycin levels as appropriate in 1-3 Days.    5. Serum creatinine levels will be ordered daily for the first week of therapy and at least twice weekly for subsequent weeks.      Eneida Nance, NATIVIDAD

## 2022-03-29 NOTE — TELEPHONE ENCOUNTER
Patient arrived at clinic for INR lab and asked to speak with RN.  This RN brought patient and his grand daughter into private room and discussed the below documentation per Dr. Nora MD.  This RN huddled with JOSSELIN Garcia regarding patient symptoms and concerns regarding cellulitis, warmth to the touch, hardened areas below knee and very red legs below the knee.  Vitor Alvarenga PAC stated due to continued worsening symptoms, patient advised to be evaluated with face to face assessment in the emergency room.  This RN walked patient and patient's grand daughter to emergency room here at [facility] and assisted in checking patient into  in that area.  Advised patient and patient's grand daughter to call with any further questions or concerns.  Patient and patient's grand daughter stated understanding.    Flor Mcpherson RN

## 2022-03-29 NOTE — TELEPHONE ENCOUNTER
RN TRIAGE CALL:    Patient Contact    Attempt # 1    Was call answered?  No.  Left message on voicemail with information to call clinic triage RN back about getting Jamal scheduled per noted documentation below Dr. Nora MD.  Advised to seek emergency care for worsening symptoms.     Kole Melendez MD  Dyad 1 Triage 14 minutes ago (7:54 AM)     RC    Please work with [employer] for workin for patient this week. OK for workin on my schedule Thursday or Friday, PAUL if none available Tuesday or Wednesday. Please call patient  to schedule time.   Electronically signed by Kole Melendez MD     Message text        Flor Mcpherson RN

## 2022-03-29 NOTE — ED TRIAGE NOTES
Pt presents with leg swelling and redness.Swelling started on 03/21/2022 Right more than left. Pt is with granddaughter. Pt mentions he had recent fall where he broke 2 ribs and right clavicle. Pt is on blood thinners. Pt on antibiotics and lasix.

## 2022-03-29 NOTE — H&P
[facility]    History and Physical - Hospitalist Service       Date of Admission:  3/29/2022    Assessment & Plan      Jamal Francis is a 89 year old male with a history of anxiety, COPD, atrial fibrillation on chronic warfarin, and recent fall with right clavicular fracture who presented to the ED on 3/29/2022 with concerns of swelling and redness to his lower extremities.  In the ED, patient was afebrile and hemodynamically stable. Patient was not hypoxic although patient's granddaughter notes patient has been more short of breath over the last several weeks. WBC normal but CRP elevated at 41.6. Creatinine mildly elevated from baseline at 1.29. BNP also elevated at 5279. Ultrasound of bilateral lower extremities negative for DVT.Chest xray done with findings consistent with CHF. Patient was given 60 mg IV Lasix and family reports improvement in lower extremity swelling since he received this. He was started on Unasyn and vancomycin and will be admitted to observation status for ongoing management of cellulitis and workup for possible CHF.     Principal Problem:      Cellulitis of right leg  Assessment: Patient presented with an 8 day history of swelling to bilateral lower extremities with right worse than left. Patient with erythema from below right knee down to foot with a particularly red area just below the right knee. Area is warm to the touch and patient does note some discomfort with palpation. Was seen in the ED 3/24 and started on Keflex. Patient notes ongoing worsening of redness despite Keflex. No drainage or skin fluctuance appreciated. WBC normal. Patient afebrile. Started on vancomycin and Unasyn in the ED.   Plan:   - Admit to observation status  - Continue IV Unasyn   - Continue IV vancomycin and appreciate pharmacy assistance with dosing  - Scheduled Tylenol and oral dilaudid as needed for pain  - Continue to monitor     Active Problems:      CHF (congestive heart  failure) (H)-possible    Bilateral leg edema  Assessment: Patient with increased lower extremity swelling over last 7-10 days per granddaughter report. Granddaughter also notes patient with some increased shortness of breath although he was not hypoxic in the ED. Chest xray with findings concerning for CHF and BNP elevated upon admission at 5279. Patient was started on oral Lasix 40 mg BID after ED visit 3/25 but granddaughter notes swelling has worsened. Patient did receive IV Lasix in the ED and family notes lower extremity swelling improved after he received this.   Plan:   - Will resume PTA dose of Lasix 40 mg BID  - Echocardiogram ordered   - Daily weights  - Strict I & O's  - Continue to monitor       Atrial fibrillation (H)    Long-term (current) use of anticoagulants [Z79.01]  Assessment: Chronic and stable on Cardizem 30 mg daily and on warfarin for anticoagulation.   Plan:   - Continue PTA dose of Cardizem  - Pharmacy to dose warfarin  - Continue to monitor       COPD (chronic obstructive pulmonary disease) (H)  Assessment: Chronic and stable. Has albuterol inhaler at home for shortness of breath/wheezing but has not used for some time. No wheezing heard on exam  Plan:   - No acute intervention indicated      Anxiety  Assessment: Granddaughter notes patient with some confusion at baseline and has a great deal of anxiety. Granddaughter notes patient is often up every hour at night pacing due to anxiety. Takes gabapentin 300 mg once daily around 1830 in the evening to assist with anxiety. Granddaughter requests something be given to patient to assist with night time anxiety. Discussed low dose of Seroquel and family in agreement. Patient received IV Ativan in the ED and is groggy upon exam.   Plan:   - Continue PTA dose of gabapentin 300 mg once daily scheduled at 1830  - Add Seroquel 12.5 mg once daily at bedtime  - IV lorazepam 0.5 mg available every 6 hours as needed for anxiety      History of fracture  of clavicle-3/11/22  Assessment: Patient with recent fall in his kitchen at home. Presented to ED and noted to have right clavicle fracture. Family notes they were told he had two rib fractures from that fall but imaging reviewed and showed old/healed rib fractures with no evidence of acute rib fractures.   Plan:   - Scheduled Tylenol as above  - Oral dilaudid available for severe pain but would limit as much as possible in patient with underlying confusion        Diet:   Low saturated fat; Na < 2400 mg  DVT Prophylaxis: Warfarin  Ramachandran Catheter: Not present  Central Lines: None  Cardiac Monitoring: None  Code Status:   FULL     Clinically Significant Risk Factors Present on Admission               Disposition Plan   Expected Discharge: 03/31/2022   Anticipated discharge location:  Awaiting care coordination huddle     The patient's care was discussed with the Attending Physician, Dr. Garcia, Patient and Patient's Family.    Renard Huynh NP  Hospitalist Service  [facility]  Securely message with the Vocera Web Console (learn more here)  Text page via [employer] Paging/Directory         ______________________________________________________________________    Chief Complaint   Lower extremity redness and swelling     History is obtained from the patient and patient's granddaughter, Amparo    History of Present Illness   Jamal Francis is a 89 year old male with a history of anxiety, COPD, atrial fibrillation on chronic warfarin, and recent fall with right clavicular fracture who presented to the ED on 3/29/2022 with concerns of swelling and redness to his lower extremities.  Patient is here with his granddaughter Amparo who assists with history.  He has had lower leg swelling, right worse than left, for about 8 days now.  5 days ago, he was seen in the clinic with his primary care provider and started on Keflex for treatment of cellulitis.  He was also started on Lasix 40 mg twice daily  for swelling.  The swelling and redness in his right leg seems to be getting worse.  He has not had any fevers, body aches, or appetite changes associated.  He has not been complaining of pain but when area is palpated he reports it is actually sore.  He has had difficulty lifting legs due to heaviness.  He has not had any chest pain, shortness of breath, nausea or vomiting.  No abdominal pain.  He did break his clavicle a few weeks ago falling and family also reported he broke a few ribs during this fall but imaging done at that time did not show any acute rib fractures. In the ED, patient was afebrile and hemodynamically stable. Patient was not hypoxic although patient's granddaughter notes patient has been more short of breath over the last several weeks. WBC normal but CRP elevated at 41.6. Creatinine mildly elevated from baseline at 1.29. BNP also elevated at 5279. Ultrasound of bilateral lower extremities negative for DVT.Chest xray done with findings consistent with CHF. Patient was given 60 mg IV Lasix and family reports improvement in lower extremity swelling since he received this. He was started on Unasyn and vancomycin and will be admitted to observation status for ongoing management of cellulitis and workup for possible CHF.     Review of Systems    The 10 point Review of Systems is negative other than noted in the HPI or here.     Past Medical History    I have reviewed this patient's medical history and updated it with pertinent information if needed.   Past Medical History:   Diagnosis Date     Contact dermatitis and other eczema, due to unspecified cause     Both temples     Essential hypertension, benign 6/27/2016     Inguinal hernia without mention of obstruction or gangrene, unilateral or unspecified, (not specified as recurrent)      Injury, other and unspecified, unspecified site     Rib fracture     Measles without mention of complication     Measles     Mumps without mention of complication      Mumps     Pneumonia in whooping cough(484.3)      Varicella without mention of complication     Chickenpox       Past Surgical History   I have reviewed this patient's surgical history and updated it with pertinent information if needed.  Past Surgical History:   Procedure Laterality Date     CATARACT IOL, RT/LT  7/27/2006    Right eye     HC REMV CATARACT EXTRACAP,INSERT LENS, W/O ECP  08/24/06    lEFT EYE     HC REPAIR ING HERNIA,5+Y/O,REDUCIBL  1993       Social History   I have reviewed this patient's social history and updated it with pertinent information if needed.  Social History     Tobacco Use     Smoking status: Never Smoker     Smokeless tobacco: Never Used   Vaping Use     Vaping Use: Never used   Substance Use Topics     Alcohol use: Not Currently     Comment: Moderately     Drug use: No       Family History   I have reviewed this patient's family history and updated it with pertinent information if needed.  Family History   Problem Relation Age of Onset     Cancer Father         Liver     Cancer Paternal Grandfather      Respiratory Brother         Pneumonia       Prior to Admission Medications   Prior to Admission Medications   Prescriptions Last Dose Informant Patient Reported? Taking?   albuterol (PROAIR HFA/PROVENTIL HFA/VENTOLIN HFA) 108 (90 Base) MCG/ACT inhaler Past Week at Unknown time Care Giver No Yes   Sig: Inhale 2 puffs into the lungs every 6 hours as needed for shortness of breath / dyspnea or wheezing   cephALEXin (KEFLEX) 500 MG capsule 3/29/2022 at am 1st Care Giver No Yes   Sig: Take 1 capsule (500 mg) by mouth 3 times daily   diltiazem (CARDIZEM) 30 MG tablet 3/29/2022 at am Care Giver No Yes   Sig: Take 1 tablet (30 mg) by mouth daily   furosemide (LASIX) 20 MG tablet 3/29/2022 at am 1st Care Giver Yes Yes   Sig: Take 2 tablets (40 mg) by mouth 2 times daily   gabapentin (NEURONTIN) 300 MG capsule 3/28/2022 at hs Care Giver No Yes   Sig: Take 1 capsule (300 mg) by mouth At  Bedtime   ipratropium - albuterol 0.5 mg/2.5 mg/3 mL (DUONEB) 0.5-2.5 (3) MG/3ML neb solution  Care Giver No No   Sig: Take 1 vial (3 mLs) by nebulization every 6 hours as needed for shortness of breath / dyspnea or wheezing   multivitamin w/minerals (MULTI-VITAMIN) tablet 3/28/2022 at pm Care Giver Yes Yes   Sig: Take 1 tablet by mouth daily   order for DME  Care Giver No No   Sig: Nebulizer machine and tubing/cup   Patient not taking: Reported on 8/27/2021   potassium chloride ER (KLOR-CON M) 10 MEQ CR tablet 3/29/2022 at am 1st Care Giver No Yes   Sig: Take 1 tablet (10 mEq) by mouth 2 times daily   warfarin ANTICOAGULANT (COUMADIN) 2.5 MG tablet 3/29/2022 at am 7.5mg Care Giver No Yes   Sig: Take 5 mg (2 tabs) Mon and 7.5 mg (3 tabs) all other days or as directed by coumadin clinic.   Patient taking differently: daily Take 5 mg (2 tabs) Mon and 7.5 mg (3 tabs) all other days or as directed by coumadin clinic.      Facility-Administered Medications: None     Allergies   Allergies   Allergen Reactions     Sulfa Drugs      dizziness       Physical Exam   Vital Signs: Temp: 97.5  F (36.4  C) Temp src: Temporal BP: (!) 142/84 Pulse: 100   Resp: 20 SpO2: 99 % O2 Device: None (Room air) Oxygen Delivery: 1.5 LPM  Weight: 169 lbs 0 oz    Constitutional: awake, alert, cooperative, no apparent distress, and appears stated age  Eyes: Lids and lashes normal, pupils equal, round and reactive to light, extra ocular muscles intact, sclera clear, conjunctiva normal  ENT: normocepalic, without obvious abnormality  Respiratory: No increased work of breathing, good air exchange, clear to auscultation bilaterally, no crackles or wheezing  Cardiovascular: irregularly irregular rhythm  GI: normal bowel sounds, non-distended and non-tender  Skin: erythema noted to rightl lower extremity just below knee to feet   Musculoskeletal: 1-2 + edema noted to bilateral feet, ankles and lower legs  Neurologic: Sleepy but arouses to voice after  receiving IV lorazepam and dilaudid; Understands he is in hospital; knows he is here because of swelling to his legs; difficult to assess due to sleepiness; no focal neurologic deficits    Data   Data reviewed today: I reviewed all medications, new labs and imaging results over the last 24 hours    Recent Labs   Lab 03/29/22  1253 03/25/22  1152   WBC 6.8 6.8   HGB 10.1* 9.7*   MCV 89 89    328   INR 3.31*  --      --    POTASSIUM 4.1  --    CHLORIDE 107  --    CO2 25  --    BUN 37*  --    CR 1.29*  --    ANIONGAP 6  --    ANDRE 8.1*  --    *  --    ALBUMIN 2.8*  --    PROTTOTAL 7.2  --    BILITOTAL 1.0  --    ALKPHOS 124  --    ALT 41  --    AST 46*  --      Recent Results (from the past 24 hour(s))   US Lower Extremity Venous Duplex Bilateral    Narrative    ULTRASOUND BILATERAL LOWER EXTREMITIES VENOUS DUPLEX March 29, 2022  2:52 PM    CLINICAL HISTORY: Leg swelling, rule out deep vein thrombosis. Patient  is currently taking warfarin.    TECHNIQUE: Venous Duplex ultrasound of bilateral lower extremities  with and without compression, augmentation and duplex. Color flow and  spectral Doppler with waveform analysis performed.    COMPARISON: None.    FINDINGS: Exam includes the common femoral, femoral, popliteal veins  as well as segmentally visualized deep calf veins and greater  saphenous vein.     RIGHT: No deep vein thrombosis. No superficial thrombophlebitis. No  popliteal cyst. Mild edema in the subcutaneous adipose tissues of the  lower leg.    LEFT: No deep vein thrombosis. No superficial thrombophlebitis. No  popliteal cyst. Mild edema in the subcutaneous adipose tissues of the  lower leg.    The waveforms of the venous structures in the bilateral lower  extremities are pulsatile. This indicates increased central venous  pressure as can be seen with congestive heart failure or other  etiologies.      Impression    IMPRESSION:  1.  No deep venous thrombosis in either lower extremity.  2.   There is mild edema in the subcutaneous contains adipose tissues  of the bilateral lower legs.  3.  Waveforms in the venous structures of bilateral lower extremities  are more pulsatile than typically seen indicating increased central  venous pressure as can be seen with congestive heart failure,  pulmonary artery hypertension, pulmonary artery embolism as well as  other etiologies. Recommend clinical correlation.    I discussed the lack of deep venous thrombosis and the presence of  increased pulsatility in the waveforms with Miladys Francis on  3/29/2022 at 3:17 PM.   XR Chest Port 1 View    Narrative    CHEST ONE VIEW PORTABLE  3/29/2022 3:53 PM     HISTORY: Congestive heart failure.    COMPARISON: Chest x-rays dated 3/25/2022.    FINDINGS:  Diffusely _______ pulmonary markings bilaterally have  worsened since the prior study dated 3/25/2022. The cardiac silhouette  remains enlarged. No pneumothorax is identified. No definite pleural  fluid collection is seen, although small pleural fluid collections are  considered likely. No acute fracture or malalignment is seen. There  are moderate to severe degenerative changes of the bilateral  glenohumeral joints. Mild shift of the trachea to the left is seen in  the upper mediastinum. This was also seen on the prior CT examination  dated 3/11/2022.      Impression    IMPRESSION:  1. Findings are concerning for worsening congestive heart failure  given the increasing pulmonary interstitial markings and given the  cardiomegaly, although no definite pleural fluid collections are seen.  These findings could also represent worsening pulmonary infiltrates.  2. No other significant changes.

## 2022-03-29 NOTE — H&P
[facility]    History and Physical - Hospitalist Service       Date of Admission:  3/29/2022    Assessment & Plan      Jamal Francis is a 89 year old male with a history of anxiety, COPD, atrial fibrillation on chronic warfarin, and recent fall with right clavicular fracture who presented to the ED on 3/29/2022 with concerns of swelling and redness to his lower extremities.  In the ED, patient was afebrile and hemodynamically stable. Patient was not hypoxic although patient's granddaughter notes patient has been more short of breath over the last several weeks. WBC normal but CRP elevated at 41.6. Creatinine mildly elevated from baseline at 1.29. BNP also elevated at 5279. Ultrasound of bilateral lower extremities negative for DVT.Chest xray done with findings consistent with CHF. Patient was given 60 mg IV Lasix and family reports improvement in lower extremity swelling since he received this. He was started on Unasyn and vancomycin and will be admitted to observation status for ongoing management of cellulitis and workup for possible CHF.     Principal Problem:      Cellulitis of right leg  Assessment: Patient presented with an 8 day history of swelling to bilateral lower extremities with right worse than left. Patient with erythema from below right knee down to foot with a particularly red area just below the right knee. Area is warm to the touch and patient does note some discomfort with palpation. Was seen in the ED 3/24 and started on Keflex. Patient notes ongoing worsening of redness despite Keflex. No drainage or skin fluctuance appreciated. WBC normal. Patient afebrile. Started on vancomycin and Unasyn in the ED.   Plan:   - Admit to observation status  - Continue IV Unasyn   - Continue IV vancomycin and appreciate pharmacy assistance with dosing  - Scheduled Tylenol and oral dilaudid as needed for pain  - Continue to monitor     Active Problems:      CHF (congestive heart  failure) (H)-possible    Bilateral leg edema  Assessment: Patient with increased lower extremity swelling over last 7-10 days per granddaughter report. Granddaughter also notes patient with some increased shortness of breath although he was not hypoxic in the ED. Chest xray with findings concerning for CHF and BNP elevated upon admission at 5279. Patient was started on oral Lasix 40 mg BID after ED visit 3/25 but granddaughter notes swelling has worsened. Patient did receive IV Lasix in the ED and family notes lower extremity swelling improved after he received this.   Plan:   - Will resume PTA dose of Lasix 40 mg BID  - Echocardiogram ordered   - Daily weights and strict I & O's  - Continue to monitor       Atrial fibrillation (H)    Long-term (current) use of anticoagulants [Z79.01]  Assessment: Chronic and stable on Cardizem 30 mg daily and on warfarin for anticoagulation.   Plan:   - Continue PTA dose of Cardizem  - Pharmacy to dose warfarin  - Continue to monitor       COPD (chronic obstructive pulmonary disease) (H)  Assessment: Chronic and stable. Has albuterol inhaler at home for shortness of breath/wheezing but has not used for some time. No wheezing heard on exam  Plan:   - No acute intervention indicated      Anxiety  Assessment: Granddaughter notes patient with some confusion at baseline and has a great deal of anxiety. Granddaughter notes patient is often up every hour at night pacing due to anxiety. Takes gabapentin 300 mg once daily around 1830 in the evening to assist with anxiety. Granddaughter requests something be given to patient to assist with night time anxiety. Discussed low dose of Seroquel and family in agreement. Patient received IV Ativan in the ED and is groggy upon exam.   Plan:   - Continue PTA dose of gabapentin 300 mg once daily scheduled at 1830  - Add Seroquel 12.5 mg once daily at bedtime  - IV lorazepam 0.5 mg available every 6 hours as needed for anxiety      History of fracture  of clavicle-3/11/22  Assessment: Patient with recent fall in his kitchen at home. Presented to ED and noted to have right clavicle fracture. Family notes they were told he had two rib fractures from that fall but imaging reviewed and showed old/healed rib fractures with no evidence of acute rib fractures.   Plan:   - Scheduled Tylenol as above  - Oral dilaudid available for severe pain but would limit as much as possible in patient with underlying confusion        Diet:   Low saturated fat; Na < 2400 mg  DVT Prophylaxis: Warfarin  Ramachandran Catheter: Not present  Central Lines: None  Cardiac Monitoring: None  Code Status:   FULL     Clinically Significant Risk Factors Present on Admission               Disposition Plan   Expected Discharge: 03/31/2022   Anticipated discharge location:  Awaiting care coordination huddle     The patient's care was discussed with the Attending Physician, Dr. Garcia, Patient and Patient's Family.    Renard Huynh NP  Hospitalist Service  [facility]  Securely message with the Vocera Web Console (learn more here)  Text page via [employer] Paging/Directory         ______________________________________________________________________    Chief Complaint   Lower extremity redness and swelling     History is obtained from the patient and patient's granddaughter, Amparo    History of Present Illness   Jamal Francis is a 89 year old male with a history of anxiety, COPD, atrial fibrillation on chronic warfarin, and recent fall with right clavicular fracture who presented to the ED on 3/29/2022 with concerns of swelling and redness to his lower extremities.  Patient is here with his granddaughter Amparo who assists with history.  He has had lower leg swelling, right worse than left, for about 8 days now.  5 days ago, he was seen in the clinic with his primary care provider and started on Keflex for treatment of cellulitis.  He was also started on Lasix 40 mg twice daily  for swelling.  The swelling and redness in his right leg seems to be getting worse.  He has not had any fevers, body aches, or appetite changes associated.  He has not been complaining of pain but when area is palpated he reports it is actually sore.  He has had difficulty lifting legs due to heaviness.  He has not had any chest pain, shortness of breath, nausea or vomiting.  No abdominal pain.  He did break his clavicle a few weeks ago falling and family also reported he broke a few ribs during this fall but imaging done at that time did not show any acute rib fractures. In the ED, patient was afebrile and hemodynamically stable. Patient was not hypoxic although patient's granddaughter notes patient has been more short of breath over the last several weeks. WBC normal but CRP elevated at 41.6. Creatinine mildly elevated from baseline at 1.29. BNP also elevated at 5279. Ultrasound of bilateral lower extremities negative for DVT.Chest xray done with findings consistent with CHF. Patient was given 60 mg IV Lasix and family reports improvement in lower extremity swelling since he received this. He was started on Unasyn and vancomycin and will be admitted to observation status for ongoing management of cellulitis and workup for possible CHF.     Review of Systems    The 10 point Review of Systems is negative other than noted in the HPI or here.     Past Medical History    I have reviewed this patient's medical history and updated it with pertinent information if needed.   Past Medical History:   Diagnosis Date     Contact dermatitis and other eczema, due to unspecified cause     Both temples     Essential hypertension, benign 6/27/2016     Inguinal hernia without mention of obstruction or gangrene, unilateral or unspecified, (not specified as recurrent)      Injury, other and unspecified, unspecified site     Rib fracture     Measles without mention of complication     Measles     Mumps without mention of complication      Mumps     Pneumonia in whooping cough(484.3)      Varicella without mention of complication     Chickenpox       Past Surgical History   I have reviewed this patient's surgical history and updated it with pertinent information if needed.  Past Surgical History:   Procedure Laterality Date     CATARACT IOL, RT/LT  7/27/2006    Right eye     HC REMV CATARACT EXTRACAP,INSERT LENS, W/O ECP  08/24/06    lEFT EYE     HC REPAIR ING HERNIA,5+Y/O,REDUCIBL  1993       Social History   I have reviewed this patient's social history and updated it with pertinent information if needed.  Social History     Tobacco Use     Smoking status: Never Smoker     Smokeless tobacco: Never Used   Vaping Use     Vaping Use: Never used   Substance Use Topics     Alcohol use: Not Currently     Comment: Moderately     Drug use: No       Family History   I have reviewed this patient's family history and updated it with pertinent information if needed.  Family History   Problem Relation Age of Onset     Cancer Father         Liver     Cancer Paternal Grandfather      Respiratory Brother         Pneumonia       Prior to Admission Medications   Prior to Admission Medications   Prescriptions Last Dose Informant Patient Reported? Taking?   albuterol (PROAIR HFA/PROVENTIL HFA/VENTOLIN HFA) 108 (90 Base) MCG/ACT inhaler Past Week at Unknown time Care Giver No Yes   Sig: Inhale 2 puffs into the lungs every 6 hours as needed for shortness of breath / dyspnea or wheezing   cephALEXin (KEFLEX) 500 MG capsule 3/29/2022 at am 1st Care Giver No Yes   Sig: Take 1 capsule (500 mg) by mouth 3 times daily   diltiazem (CARDIZEM) 30 MG tablet 3/29/2022 at am Care Giver No Yes   Sig: Take 1 tablet (30 mg) by mouth daily   furosemide (LASIX) 20 MG tablet 3/29/2022 at am 1st Care Giver Yes Yes   Sig: Take 2 tablets (40 mg) by mouth 2 times daily   gabapentin (NEURONTIN) 300 MG capsule 3/28/2022 at hs Care Giver No Yes   Sig: Take 1 capsule (300 mg) by mouth At  Bedtime   ipratropium - albuterol 0.5 mg/2.5 mg/3 mL (DUONEB) 0.5-2.5 (3) MG/3ML neb solution  Care Giver No No   Sig: Take 1 vial (3 mLs) by nebulization every 6 hours as needed for shortness of breath / dyspnea or wheezing   multivitamin w/minerals (MULTI-VITAMIN) tablet 3/28/2022 at pm Care Giver Yes Yes   Sig: Take 1 tablet by mouth daily   order for DME  Care Giver No No   Sig: Nebulizer machine and tubing/cup   Patient not taking: Reported on 8/27/2021   potassium chloride ER (KLOR-CON M) 10 MEQ CR tablet 3/29/2022 at am 1st Care Giver No Yes   Sig: Take 1 tablet (10 mEq) by mouth 2 times daily   warfarin ANTICOAGULANT (COUMADIN) 2.5 MG tablet 3/29/2022 at am 7.5mg Care Giver No Yes   Sig: Take 5 mg (2 tabs) Mon and 7.5 mg (3 tabs) all other days or as directed by coumadin clinic.   Patient taking differently: daily Take 5 mg (2 tabs) Mon and 7.5 mg (3 tabs) all other days or as directed by coumadin clinic.      Facility-Administered Medications: None     Allergies   Allergies   Allergen Reactions     Sulfa Drugs      dizziness       Physical Exam   Vital Signs: Temp: 97.5  F (36.4  C) Temp src: Temporal BP: (!) 142/84 Pulse: 100   Resp: 20 SpO2: 99 % O2 Device: None (Room air) Oxygen Delivery: 1.5 LPM  Weight: 169 lbs 0 oz    Constitutional: awake, alert, cooperative, no apparent distress, and appears stated age  Eyes: Lids and lashes normal, pupils equal, round and reactive to light, extra ocular muscles intact, sclera clear, conjunctiva normal  ENT: normocepalic, without obvious abnormality  Respiratory: No increased work of breathing, good air exchange, clear to auscultation bilaterally, no crackles or wheezing  Cardiovascular: irregularly irregular rhythm  GI: normal bowel sounds, non-distended and non-tender  Skin: erythema noted to rightl lower extremity just below knee to feet   Musculoskeletal: 1-2 + edema noted to bilateral feet, ankles and lower legs  Neurologic: Sleepy but arouses to voice after  receiving IV lorazepam and dilaudid; Understands he is in hospital; knows he is here because of swelling to his legs; difficult to assess due to sleepiness; no focal neurologic deficits    Data   Data reviewed today: I reviewed all medications, new labs and imaging results over the last 24 hours    Recent Labs   Lab 03/29/22  1253 03/25/22  1152   WBC 6.8 6.8   HGB 10.1* 9.7*   MCV 89 89    328   INR 3.31*  --      --    POTASSIUM 4.1  --    CHLORIDE 107  --    CO2 25  --    BUN 37*  --    CR 1.29*  --    ANIONGAP 6  --    ANDRE 8.1*  --    *  --    ALBUMIN 2.8*  --    PROTTOTAL 7.2  --    BILITOTAL 1.0  --    ALKPHOS 124  --    ALT 41  --    AST 46*  --      Recent Results (from the past 24 hour(s))   US Lower Extremity Venous Duplex Bilateral    Narrative    ULTRASOUND BILATERAL LOWER EXTREMITIES VENOUS DUPLEX March 29, 2022  2:52 PM    CLINICAL HISTORY: Leg swelling, rule out deep vein thrombosis. Patient  is currently taking warfarin.    TECHNIQUE: Venous Duplex ultrasound of bilateral lower extremities  with and without compression, augmentation and duplex. Color flow and  spectral Doppler with waveform analysis performed.    COMPARISON: None.    FINDINGS: Exam includes the common femoral, femoral, popliteal veins  as well as segmentally visualized deep calf veins and greater  saphenous vein.     RIGHT: No deep vein thrombosis. No superficial thrombophlebitis. No  popliteal cyst. Mild edema in the subcutaneous adipose tissues of the  lower leg.    LEFT: No deep vein thrombosis. No superficial thrombophlebitis. No  popliteal cyst. Mild edema in the subcutaneous adipose tissues of the  lower leg.    The waveforms of the venous structures in the bilateral lower  extremities are pulsatile. This indicates increased central venous  pressure as can be seen with congestive heart failure or other  etiologies.      Impression    IMPRESSION:  1.  No deep venous thrombosis in either lower extremity.  2.   There is mild edema in the subcutaneous contains adipose tissues  of the bilateral lower legs.  3.  Waveforms in the venous structures of bilateral lower extremities  are more pulsatile than typically seen indicating increased central  venous pressure as can be seen with congestive heart failure,  pulmonary artery hypertension, pulmonary artery embolism as well as  other etiologies. Recommend clinical correlation.    I discussed the lack of deep venous thrombosis and the presence of  increased pulsatility in the waveforms with Miladys Francis on  3/29/2022 at 3:17 PM.   XR Chest Port 1 View    Narrative    CHEST ONE VIEW PORTABLE  3/29/2022 3:53 PM     HISTORY: Congestive heart failure.    COMPARISON: Chest x-rays dated 3/25/2022.    FINDINGS:  Diffusely _______ pulmonary markings bilaterally have  worsened since the prior study dated 3/25/2022. The cardiac silhouette  remains enlarged. No pneumothorax is identified. No definite pleural  fluid collection is seen, although small pleural fluid collections are  considered likely. No acute fracture or malalignment is seen. There  are moderate to severe degenerative changes of the bilateral  glenohumeral joints. Mild shift of the trachea to the left is seen in  the upper mediastinum. This was also seen on the prior CT examination  dated 3/11/2022.      Impression    IMPRESSION:  1. Findings are concerning for worsening congestive heart failure  given the increasing pulmonary interstitial markings and given the  cardiomegaly, although no definite pleural fluid collections are seen.  These findings could also represent worsening pulmonary infiltrates.  2. No other significant changes.

## 2022-03-30 ENCOUNTER — APPOINTMENT (OUTPATIENT)
Dept: CARDIOLOGY | Facility: CLINIC | Age: 87
DRG: 603 | End: 2022-03-30
Attending: NURSE PRACTITIONER
Payer: COMMERCIAL

## 2022-03-30 PROBLEM — I50.30 HEART FAILURE WITH PRESERVED EJECTION FRACTION, NYHA CLASS II (H): Status: ACTIVE | Noted: 2022-03-29

## 2022-03-30 LAB
ANION GAP SERPL CALCULATED.3IONS-SCNC: 6 MMOL/L (ref 3–14)
BUN SERPL-MCNC: 30 MG/DL (ref 7–30)
CALCIUM SERPL-MCNC: 8.1 MG/DL (ref 8.5–10.1)
CHLORIDE BLD-SCNC: 110 MMOL/L (ref 94–109)
CO2 SERPL-SCNC: 25 MMOL/L (ref 20–32)
CREAT SERPL-MCNC: 1.09 MG/DL (ref 0.66–1.25)
ERYTHROCYTE [DISTWIDTH] IN BLOOD BY AUTOMATED COUNT: 16.8 % (ref 10–15)
GFR SERPL CREATININE-BSD FRML MDRD: 64 ML/MIN/1.73M2
GLUCOSE BLD-MCNC: 87 MG/DL (ref 70–99)
HCT VFR BLD AUTO: 31.5 % (ref 40–53)
HGB BLD-MCNC: 10.4 G/DL (ref 13.3–17.7)
INR PPP: 3.5 (ref 0.85–1.15)
LVEF ECHO: NORMAL
MCH RBC QN AUTO: 28.5 PG (ref 26.5–33)
MCHC RBC AUTO-ENTMCNC: 33 G/DL (ref 31.5–36.5)
MCV RBC AUTO: 86 FL (ref 78–100)
PLATELET # BLD AUTO: 340 10E3/UL (ref 150–450)
POTASSIUM BLD-SCNC: 3.7 MMOL/L (ref 3.4–5.3)
RBC # BLD AUTO: 3.65 10E6/UL (ref 4.4–5.9)
SODIUM SERPL-SCNC: 141 MMOL/L (ref 133–144)
WBC # BLD AUTO: 5.9 10E3/UL (ref 4–11)

## 2022-03-30 PROCEDURE — 93005 ELECTROCARDIOGRAM TRACING: CPT

## 2022-03-30 PROCEDURE — 250N000011 HC RX IP 250 OP 636: Performed by: NURSE PRACTITIONER

## 2022-03-30 PROCEDURE — 93306 TTE W/DOPPLER COMPLETE: CPT

## 2022-03-30 PROCEDURE — 85610 PROTHROMBIN TIME: CPT | Performed by: NURSE PRACTITIONER

## 2022-03-30 PROCEDURE — 93306 TTE W/DOPPLER COMPLETE: CPT | Mod: 26 | Performed by: INTERNAL MEDICINE

## 2022-03-30 PROCEDURE — 250N000013 HC RX MED GY IP 250 OP 250 PS 637: Performed by: NURSE PRACTITIONER

## 2022-03-30 PROCEDURE — 120N000001 HC R&B MED SURG/OB

## 2022-03-30 PROCEDURE — 85027 COMPLETE CBC AUTOMATED: CPT | Performed by: NURSE PRACTITIONER

## 2022-03-30 PROCEDURE — 36415 COLL VENOUS BLD VENIPUNCTURE: CPT | Performed by: NURSE PRACTITIONER

## 2022-03-30 PROCEDURE — 99232 SBSQ HOSP IP/OBS MODERATE 35: CPT | Performed by: NURSE PRACTITIONER

## 2022-03-30 PROCEDURE — 80048 BASIC METABOLIC PNL TOTAL CA: CPT | Performed by: NURSE PRACTITIONER

## 2022-03-30 PROCEDURE — 250N000011 HC RX IP 250 OP 636: Performed by: PHYSICIAN ASSISTANT

## 2022-03-30 RX ADMIN — AMPICILLIN SODIUM AND SULBACTAM SODIUM 3 G: 2; 1 INJECTION, POWDER, FOR SOLUTION INTRAMUSCULAR; INTRAVENOUS at 03:28

## 2022-03-30 RX ADMIN — AMPICILLIN SODIUM AND SULBACTAM SODIUM 3 G: 2; 1 INJECTION, POWDER, FOR SOLUTION INTRAMUSCULAR; INTRAVENOUS at 20:12

## 2022-03-30 RX ADMIN — AMPICILLIN SODIUM AND SULBACTAM SODIUM 3 G: 2; 1 INJECTION, POWDER, FOR SOLUTION INTRAMUSCULAR; INTRAVENOUS at 15:40

## 2022-03-30 RX ADMIN — FUROSEMIDE 40 MG: 40 TABLET ORAL at 15:38

## 2022-03-30 RX ADMIN — ACETAMINOPHEN 975 MG: 325 TABLET, FILM COATED ORAL at 20:09

## 2022-03-30 RX ADMIN — VANCOMYCIN HYDROCHLORIDE 1000 MG: 1 INJECTION, SOLUTION INTRAVENOUS at 13:57

## 2022-03-30 RX ADMIN — LORAZEPAM 0.5 MG: 2 INJECTION INTRAMUSCULAR; INTRAVENOUS at 21:56

## 2022-03-30 RX ADMIN — DILTIAZEM HYDROCHLORIDE 30 MG: 30 TABLET, FILM COATED ORAL at 10:09

## 2022-03-30 RX ADMIN — LORAZEPAM 0.5 MG: 2 INJECTION INTRAMUSCULAR; INTRAVENOUS at 15:54

## 2022-03-30 RX ADMIN — POTASSIUM CHLORIDE 10 MEQ: 750 TABLET, EXTENDED RELEASE ORAL at 20:11

## 2022-03-30 RX ADMIN — FUROSEMIDE 40 MG: 40 TABLET ORAL at 08:00

## 2022-03-30 RX ADMIN — ACETAMINOPHEN 975 MG: 325 TABLET, FILM COATED ORAL at 04:57

## 2022-03-30 RX ADMIN — AMPICILLIN SODIUM AND SULBACTAM SODIUM 3 G: 2; 1 INJECTION, POWDER, FOR SOLUTION INTRAMUSCULAR; INTRAVENOUS at 10:11

## 2022-03-30 RX ADMIN — QUETIAPINE FUMARATE 12.5 MG: 25 TABLET ORAL at 20:10

## 2022-03-30 RX ADMIN — POTASSIUM CHLORIDE 10 MEQ: 750 TABLET, EXTENDED RELEASE ORAL at 10:08

## 2022-03-30 RX ADMIN — ACETAMINOPHEN 975 MG: 325 TABLET, FILM COATED ORAL at 13:56

## 2022-03-30 RX ADMIN — GABAPENTIN 300 MG: 300 CAPSULE ORAL at 20:10

## 2022-03-30 ASSESSMENT — ACTIVITIES OF DAILY LIVING (ADL)
ADLS_ACUITY_SCORE: 11
ADLS_ACUITY_SCORE: 11
ADLS_ACUITY_SCORE: 10
ADLS_ACUITY_SCORE: 10
ADLS_ACUITY_SCORE: 11
ADLS_ACUITY_SCORE: 10
ADLS_ACUITY_SCORE: 11
ADLS_ACUITY_SCORE: 10
ADLS_ACUITY_SCORE: 11
ADLS_ACUITY_SCORE: 11
ADLS_ACUITY_SCORE: 10
ADLS_ACUITY_SCORE: 11
ADLS_ACUITY_SCORE: 11
ADLS_ACUITY_SCORE: 10
ADLS_ACUITY_SCORE: 11
ADLS_ACUITY_SCORE: 10
ADLS_ACUITY_SCORE: 10
ADLS_ACUITY_SCORE: 11
ADLS_ACUITY_SCORE: 10

## 2022-03-30 NOTE — H&P
[facility]    History and Physical - Hospitalist Service       Date of Admission:  3/29/2022    Assessment & Plan      Jamal Francis is a 89 year old male with a history of anxiety, COPD, atrial fibrillation on chronic warfarin, and recent fall with right clavicular fracture who presented to the ED on 3/29/2022 with concerns of swelling and redness to his lower extremities.  In the ED, patient was afebrile and hemodynamically stable. Patient was not hypoxic although patient's granddaughter notes patient has been more short of breath over the last several weeks. WBC normal but CRP elevated at 41.6. Creatinine mildly elevated from baseline at 1.29. BNP also elevated at 5279. Ultrasound of bilateral lower extremities negative for DVT.Chest xray done with findings consistent with CHF. Patient was given 60 mg IV Lasix and family reports improvement in lower extremity swelling since he received this. He was started on Unasyn and vancomycin and will be admitted to observation status for ongoing management of cellulitis and workup for possible CHF.  3/30 patient noted to have moderate improvement in right lower extremity edema and erythema.  Likely to transition to oral antibiotics on 3/31.    Principal Problem:      Cellulitis of right leg  Assessment: Patient presented with an 8 day history of swelling to bilateral lower extremities with right worse than left. Patient with erythema from below right knee down to foot with a particularly red area just below the right knee. Area is warm to the touch and patient does note some discomfort with palpation. Was seen in the ED 3/24 and started on Keflex. Patient notes ongoing worsening of redness despite Keflex. No drainage or skin fluctuance appreciated. WBC normal. Patient afebrile. Started on vancomycin and Unasyn in the ED.   Plan:   - Continue inpatient status  - Continue IV Unasyn   - Continue IV vancomycin and appreciate pharmacy assistance  with dosing  - Scheduled Tylenol and oral dilaudid as needed for pain  - Continue to monitor     Active Problems:      Heart failure with preserved ejection fracture, NYHA class II    Bilateral leg edema  Assessment: Patient with increased lower extremity swelling over last 7-10 days per granddaughter report. Granddaughter also notes patient with some increased shortness of breath although he was not hypoxic in the ED. Chest xray with findings concerning for CHF and BNP elevated upon admission at 5279. Patient was started on oral Lasix 40 mg BID after ED visit 3/25 but granddaughter notes swelling has worsened. Patient did receive IV Lasix in the ED and family notes lower extremity swelling improved after he received this.   Plan:   - Will resume PTA dose of Lasix 40 mg BID  - Echocardiogram with preserved EF of 55 to 60%.  Significant for +2 mitral valve regurgitation, +2 tricuspid valve regurgitation and +1 aortic valve regurgitation.  - Daily weights  - Strict I & O's  - Continue to monitor       Atrial fibrillation (H)    Long-term (current) use of anticoagulants [Z79.01]  Assessment: Chronic and stable on Cardizem 30 mg daily and on warfarin for anticoagulation.   Plan:   - Continue PTA dose of Cardizem  - Pharmacy to dose warfarin  - Continue to monitor       COPD (chronic obstructive pulmonary disease) (H)  Assessment: Chronic and stable. Has albuterol inhaler at home for shortness of breath/wheezing but has not used for some time. No wheezing heard on exam  Plan:   - No acute intervention indicated      Anxiety  Assessment: Granddaughter notes patient with some confusion at baseline and has a great deal of anxiety. Granddaughter notes patient is often up every hour at night pacing due to anxiety. Takes gabapentin 300 mg once daily around 1830 in the evening to assist with anxiety. Granddaughter requests something be given to patient to assist with night time anxiety. Discussed low dose of Seroquel and family  in agreement. Patient received IV Ativan in the ED and is groggy upon exam.   Plan:   - Continue PTA dose of gabapentin 300 mg once daily scheduled at 1830  - Add Seroquel 12.5 mg once daily at bedtime  - IV lorazepam 0.5 mg available every 6 hours as needed for anxiety      History of fracture of clavicle-3/11/22  Assessment: Patient with recent fall in his kitchen at home. Presented to ED and noted to have right clavicle fracture. Family notes they were told he had two rib fractures from that fall but imaging reviewed and showed old/healed rib fractures with no evidence of acute rib fractures.   Plan:   - Scheduled Tylenol as above  - Oral dilaudid available for severe pain but would limit as much as possible in patient with underlying confusion        Diet: Low Saturated Fat Na <2400 mg Low saturated fat; Na < 2400 mg  DVT Prophylaxis: Warfarin  Ramachandran Catheter: Not present  Central Lines: None  Cardiac Monitoring: None  Code Status: Full Code FULL     Clinically Significant Risk Factors Present on Admission               Disposition Plan   Expected Discharge: 03/31/2022   Anticipated discharge location:  Awaiting care coordination huddle     The patient's care was discussed with the Attending Physician, Dr. Garcia, Patient and Patient's Family.    Dano Marquez NP  Hospitalist Service  [facility]  Securely message with the [employer] (learn more here)  Text page via [employer] Paging/Directory         ______________________________________________________________________    Chief Complaint   Lower extremity redness and swelling     History is obtained from the patient and patient's granddaughter, Amparo    History of Present Illness   Jamal Francis is a 89 year old male with a history of anxiety, COPD, atrial fibrillation on chronic warfarin, and recent fall with right clavicular fracture who presented to the ED on 3/29/2022 with concerns of swelling and redness to his lower  extremities.  Patient is here with his granddaughter Amparo who assists with history.  He has had lower leg swelling, right worse than left, for about 8 days now.  5 days ago, he was seen in the clinic with his primary care provider and started on Keflex for treatment of cellulitis.  He was also started on Lasix 40 mg twice daily for swelling.  The swelling and redness in his right leg seems to be getting worse.  He has not had any fevers, body aches, or appetite changes associated.  He has not been complaining of pain but when area is palpated he reports it is actually sore.  He has had difficulty lifting legs due to heaviness.  He has not had any chest pain, shortness of breath, nausea or vomiting.  No abdominal pain.  He did break his clavicle a few weeks ago falling and family also reported he broke a few ribs during this fall but imaging done at that time did not show any acute rib fractures. In the ED, patient was afebrile and hemodynamically stable. Patient was not hypoxic although patient's granddaughter notes patient has been more short of breath over the last several weeks. WBC normal but CRP elevated at 41.6. Creatinine mildly elevated from baseline at 1.29. BNP also elevated at 5279. Ultrasound of bilateral lower extremities negative for DVT.Chest xray done with findings consistent with CHF. Patient was given 60 mg IV Lasix and family reports improvement in lower extremity swelling since he received this. He was started on Unasyn and vancomycin and will be admitted to observation status for ongoing management of cellulitis and workup for possible CHF.     Review of Systems    The 10 point Review of Systems is negative other than noted in the HPI or here.     Past Medical History    I have reviewed this patient's medical history and updated it with pertinent information if needed.   Past Medical History:   Diagnosis Date     Contact dermatitis and other eczema, due to unspecified cause     Both temples      Essential hypertension, benign 6/27/2016     Inguinal hernia without mention of obstruction or gangrene, unilateral or unspecified, (not specified as recurrent)      Injury, other and unspecified, unspecified site     Rib fracture     Measles without mention of complication     Measles     Mumps without mention of complication     Mumps     Pneumonia in whooping cough(484.3)      Varicella without mention of complication     Chickenpox       Past Surgical History   I have reviewed this patient's surgical history and updated it with pertinent information if needed.  Past Surgical History:   Procedure Laterality Date     CATARACT IOL, RT/LT  7/27/2006    Right eye     HC REMV CATARACT EXTRACAP,INSERT LENS, W/O ECP  08/24/06    lEFT EYE     HC REPAIR ING HERNIA,5+Y/O,REDUCIBL  1993       Social History   I have reviewed this patient's social history and updated it with pertinent information if needed.  Social History     Tobacco Use     Smoking status: Never Smoker     Smokeless tobacco: Never Used   Vaping Use     Vaping Use: Never used   Substance Use Topics     Alcohol use: Not Currently     Comment: Moderately     Drug use: No       Family History   I have reviewed this patient's family history and updated it with pertinent information if needed.  Family History   Problem Relation Age of Onset     Cancer Father         Liver     Cancer Paternal Grandfather      Respiratory Brother         Pneumonia       Prior to Admission Medications   Prior to Admission Medications   Prescriptions Last Dose Informant Patient Reported? Taking?   albuterol (PROAIR HFA/PROVENTIL HFA/VENTOLIN HFA) 108 (90 Base) MCG/ACT inhaler Past Week at Unknown time Care Giver No Yes   Sig: Inhale 2 puffs into the lungs every 6 hours as needed for shortness of breath / dyspnea or wheezing   cephALEXin (KEFLEX) 500 MG capsule 3/29/2022 at am 1st Care Giver No Yes   Sig: Take 1 capsule (500 mg) by mouth 3 times daily   diltiazem (CARDIZEM) 30 MG  tablet 3/29/2022 at am Care Giver No Yes   Sig: Take 1 tablet (30 mg) by mouth daily   furosemide (LASIX) 20 MG tablet 3/29/2022 at am 1st Care Giver Yes Yes   Sig: Take 2 tablets (40 mg) by mouth 2 times daily   gabapentin (NEURONTIN) 300 MG capsule 3/28/2022 at hs Care Giver No Yes   Sig: Take 1 capsule (300 mg) by mouth At Bedtime   ipratropium - albuterol 0.5 mg/2.5 mg/3 mL (DUONEB) 0.5-2.5 (3) MG/3ML neb solution  Care Giver No No   Sig: Take 1 vial (3 mLs) by nebulization every 6 hours as needed for shortness of breath / dyspnea or wheezing   multivitamin w/minerals (MULTI-VITAMIN) tablet 3/28/2022 at pm Care Giver Yes Yes   Sig: Take 1 tablet by mouth daily   order for DME  Care Giver No No   Sig: Nebulizer machine and tubing/cup   Patient not taking: Reported on 8/27/2021   potassium chloride ER (KLOR-CON M) 10 MEQ CR tablet 3/29/2022 at am 1st Care Giver No Yes   Sig: Take 1 tablet (10 mEq) by mouth 2 times daily   warfarin ANTICOAGULANT (COUMADIN) 2.5 MG tablet 3/29/2022 at am 7.5mg Care Giver No Yes   Sig: Take 5 mg (2 tabs) Mon and 7.5 mg (3 tabs) all other days or as directed by coumadin clinic.   Patient taking differently: daily Take 5 mg (2 tabs) Mon and 7.5 mg (3 tabs) all other days or as directed by coumadin clinic.      Facility-Administered Medications: None     Allergies   Allergies   Allergen Reactions     Sulfa Drugs      dizziness       Physical Exam   Vital Signs: Temp: 97.4  F (36.3  C) Temp src: Oral BP: 129/87 Pulse: 97   Resp: 16 SpO2: 95 % O2 Device: None (Room air) Oxygen Delivery: 1.5 LPM  Weight: 162 lbs 6.4 oz    Constitutional: awake, alert, cooperative, no apparent distress, and appears stated age  Eyes: Lids and lashes normal, pupils equal, round and reactive to light, extra ocular muscles intact, sclera clear, conjunctiva normal  ENT: normocepalic, without obvious abnormality  Respiratory: No increased work of breathing, good air exchange, clear to auscultation bilaterally, no  crackles or wheezing  Cardiovascular: irregularly irregular rhythm  GI: normal bowel sounds, non-distended and non-tender  Skin: erythema noted to rightl lower extremity just below knee to feet   Musculoskeletal: 1-2 + edema noted to bilateral feet, ankles and lower legs  Neurologic: Sleepy but arouses to voice after receiving IV lorazepam and dilaudid; Understands he is in hospital; knows he is here because of swelling to his legs; difficult to assess due to sleepiness; no focal neurologic deficits    Data   Data reviewed today: I reviewed all medications, new labs and imaging results over the last 24 hours    Recent Labs   Lab 03/30/22  0610 03/29/22  1253 03/25/22  1152   WBC 5.9 6.8 6.8   HGB 10.4* 10.1* 9.7*   MCV 86 89 89    350 328   INR 3.50* 3.31*  --     138  --    POTASSIUM 3.7 4.1  --    CHLORIDE 110* 107  --    CO2 25 25  --    BUN 30 37*  --    CR 1.09 1.29*  --    ANIONGAP 6 6  --    ANDRE 8.1* 8.1*  --    GLC 87 106*  --    ALBUMIN  --  2.8*  --    PROTTOTAL  --  7.2  --    BILITOTAL  --  1.0  --    ALKPHOS  --  124  --    ALT  --  41  --    AST  --  46*  --      Recent Results (from the past 24 hour(s))   US Lower Extremity Venous Duplex Bilateral    Narrative    ULTRASOUND BILATERAL LOWER EXTREMITIES VENOUS DUPLEX March 29, 2022  2:52 PM    CLINICAL HISTORY: Leg swelling, rule out deep vein thrombosis. Patient  is currently taking warfarin.    TECHNIQUE: Venous Duplex ultrasound of bilateral lower extremities  with and without compression, augmentation and duplex. Color flow and  spectral Doppler with waveform analysis performed.    COMPARISON: None.    FINDINGS: Exam includes the common femoral, femoral, popliteal veins  as well as segmentally visualized deep calf veins and greater  saphenous vein.     RIGHT: No deep vein thrombosis. No superficial thrombophlebitis. No  popliteal cyst. Mild edema in the subcutaneous adipose tissues of the  lower leg.    LEFT: No deep vein thrombosis. No  superficial thrombophlebitis. No  popliteal cyst. Mild edema in the subcutaneous adipose tissues of the  lower leg.    The waveforms of the venous structures in the bilateral lower  extremities are pulsatile. This indicates increased central venous  pressure as can be seen with congestive heart failure or other  etiologies.      Impression    IMPRESSION:  1.  No deep venous thrombosis in either lower extremity.  2.  There is mild edema in the subcutaneous contains adipose tissues  of the bilateral lower legs.  3.  Waveforms in the venous structures of bilateral lower extremities  are more pulsatile than typically seen indicating increased central  venous pressure as can be seen with congestive heart failure,  pulmonary artery hypertension, pulmonary artery embolism as well as  other etiologies. Recommend clinical correlation.    I discussed the lack of deep venous thrombosis and the presence of  increased pulsatility in the waveforms with Miladys Francis on  3/29/2022 at 3:17 PM.    GLENDA LE MD         SYSTEM ID:  [id number]   XR Chest Port 1 View    Narrative    CHEST ONE VIEW PORTABLE  3/29/2022 3:53 PM     HISTORY: Congestive heart failure.    COMPARISON: Chest x-rays dated 3/25/2022.    FINDINGS:  Diffusely increased pulmonary markings bilaterally have  worsened since the prior study dated 3/25/2022. The cardiac silhouette  remains enlarged. No pneumothorax is identified. No definite pleural  fluid collection is seen, although small pleural fluid collections are  considered likely. No acute fracture or malalignment is seen. There  are moderate to severe degenerative changes of the bilateral  glenohumeral joints. Mild shift of the trachea to the left is seen in  the upper mediastinum. This was also seen on the prior CT examination  dated 3/11/2022.      Impression    IMPRESSION:  1. Findings are concerning for worsening congestive heart failure  given the increasing pulmonary interstitial markings and given  the  cardiomegaly, although no definite pleural fluid collections are seen.  These findings could also represent worsening pulmonary infiltrates.  2. No other significant changes.    GLENDA LE MD         SYSTEM ID:  [id number]

## 2022-03-30 NOTE — PHARMACY-ANTICOAGULATION SERVICE
Clinical Pharmacy - Warfarin Dosing Consult     Pharmacy has been consulted to manage this patient s warfarin therapy.  Indication: Atrial Fibrillation  Therapy Goal: INR 2-3  Warfarin Prior to Admission: Yes  Warfarin PTA Regimen: 5 mg every Mon; 7.5 mg all other days  Recent documented change in oral intake/nutrition: Unknown    INR   Date Value Ref Range Status   03/29/2022 3.31 (H) 0.85 - 1.15 Final   03/21/2022 2.7 (H) 0.9 - 1.1 Final       Recommend warfarin ZERO mg today.  Patient has taken todays dose this am. Pharmacy will monitor Jamal Francis daily and order warfarin doses to achieve specified goal.      Please contact pharmacy as soon as possible if the warfarin needs to be held for a procedure or if the warfarin goals change.

## 2022-03-30 NOTE — CONSULTS
Care Management Initial Consult    General Information  Assessment completed with: Patient, Family, Spouse or significant other, Amparo  Type of CM/SW Visit: Initial Assessment    Primary Care Provider verified and updated as needed: Yes   Readmission within the last 30 days:        Reason for Consult: discharge planning, other (see comments) (high risk)  Advance Care Planning:          Communication Assessment  Patient's communication style: spoken language (English or Bilingual)    Hearing Difficulty or Deaf: yes   Wear Glasses or Blind: no    Cognitive  Cognitive/Neuro/Behavioral: .WDL except, orientation  Level of Consciousness: confused  Arousal Level: opens eyes spontaneously  Orientation: disoriented to, place, situation  Mood/Behavior: calm, cooperative  Best Language: 0 - No aphasia  Speech: clear    Living Environment:   People in home: spouse  Mariama  Current living Arrangements: assisted living  Name of Facility: [facility]   Able to return to prior arrangements: yes     Family/Social Support:  Care provided by: spouse/significant other, other (see comments)  Provides care for: no one, unable/limited ability to care for self  Marital Status:   Wife, Children, Other (specify) (granchildren)   (Mariama)       Description of Support System: Supportive, Involved    Support Assessment: Adequate family and caregiver support    Current Resources:   Patient receiving home care services: No     Community Resources: None  Equipment currently used at home: walker, rolling  Supplies currently used at home:      Employment/Financial:  Employment Status:          Financial Concerns:        Lifestyle & Psychosocial Needs:  Social Determinants of Health     Tobacco Use: Low Risk      Smoking Tobacco Use: Never Smoker     Smokeless Tobacco Use: Never Used   Alcohol Use: Not on file   Financial Resource Strain: Not on file   Food Insecurity: Not on file   Transportation Needs: Not on file   Physical  Activity: Not on file   Stress: Not on file   Social Connections: Not on file   Intimate Partner Violence: Not on file   Depression: At risk     PHQ-2 Score: 4   Housing Stability: Not on file     Functional Status:  Prior to admission patient needed assistance:       Mental Health Status:        Chemical Dependency Status:            Values/Beliefs:  Spiritual, Cultural Beliefs, Shinto Practices, Values that affect care:               Additional Information:  Referral received due to high risk for re-admission and from an [facility] facility.  Met with patient, his wife, Mariama, and grand-daughter, Amparo, to complete initial assessment.  Patient and Mariama live at [facility]. They have lived there since Oct 2021.  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  Patient has had 3 falls out of bed since he has lived at the [facility].  Amparo is interested in getting bed rails, but needs a doctor's script in order for the [facility] to use them.  At baseline, patient walks by holding onto Mariama's wheelchair and pushing her along.  Amparo transports patient to [facility].  Patient's PCP is Vitor Rueda, however, Amparo states he is moving clinics and transferred patient's care to Dr. Landrum.    Patient has a medication dispenser in the apartment.  He does not receive any home care services.    Amparo explained that the family plans to move patient and Mariama to a different [facility] in [facility] on April 13th to be closer to [facility] since she is the main caregiver.    Amparo is interested in a doctor's note to restrict patient's driving.  Plan is for patient to return to [facility] until the move.  Amparo to transport at discharge.  Patient is high risk for re-admission.  [facility] task sent.    KATHIE Connolly  [facility] [phone]/ [facility] [phone]  Care Management

## 2022-03-30 NOTE — PROGRESS NOTES
S-(situation): Patient arrives to room 267 via cart from ER. 710pm    B-(background): Pt lives at [facility] with wife. Pt has fallen x3 in the past week. Rib and clavical fracture.    A-(assessment): Pt has scattered bruising throughout his body. Majority on upper back and chest. Pt has nonblanchable spot on L toe. Alert to self and situation(at times). Standby assist.     R-(recommendations): Orders reviewed with Pt and family. Will monitor patient per MD orders.     Inpatient nursing criteria listed below were met:    Health care directives status obtained and documented: Yes  SCD's Documented: Yes  Skin issues/needs documented:Yes  Isolation addressed and Signage used: Yes  Fall Prevention: Care plan updated Yes Education given and documented Yes  Care Plan initiated and Co-Morbidities added: Yes  Education Assessment documented:Yes  Admission Education Documented: Yes  If present CAUTI/CLABI Education done: Yes  New medication patient education completed and documented (Possible Side Effects of Common Medications handout): Yes  Allergies Reviewed: Yes  Admission Medication Reconciliation completed: Yes  Home medications if not able to send immediately home with family stored here: Yes  Reminder note placed in discharge instructions regarding home meds: Yes  Individualized care needs/preferences addressed and charted: Yes

## 2022-03-30 NOTE — PHARMACY-ANTICOAGULATION SERVICE
Clinical Pharmacy - Warfarin Dosing Consult     Pharmacy has been consulted to manage this patient s warfarin therapy.  Indication: Atrial Fibrillation  Therapy Goal: INR 2-3  Warfarin Prior to Admission: Yes  Warfarin PTA Regimen: 5 mg every Mon; 7.5 mg all other days  Recent documented change in oral intake/nutrition: Unknown    INR   Date Value Ref Range Status   03/30/2022 3.50 (H) 0.85 - 1.15 Final   03/29/2022 3.31 (H) 0.85 - 1.15 Final     Patient took warfarin 7.5 mg yesterday morning prior to admission. No further dosing given while in hospital.    Recommend to HOLD warfarin today.  Pharmacy will monitor Jamal Francis daily and order warfarin doses to achieve specified goal.      Please contact pharmacy as soon as possible if the warfarin needs to be held for a procedure or if the warfarin goals change.

## 2022-03-30 NOTE — PLAN OF CARE
"Patient is sleepy throughout shift. There were occurences where the patient got out of his bed without assistance and the alarm went off. During conversation the patient was confused and disoriented to place, date, time, and reason for being in hospital. RN discussed where he was and why he was here. Patient is up with assist of one using a gait belt and walker. Right lower leg remain reddened and decreasing from the outline with blanchable redness.Hips, lower legs, and ankles are edematous with pitting.  With activity the patient's heart went into AFIB with RVR and a stat EKG was performed. HR improved.   Vitals are stable.     /87 (BP Location: Right arm, Patient Position: Semi-Ellison's)   Pulse 97   Temp 97.4  F (36.3  C) (Oral)   Resp 16   Ht 1.6 m (5' 3\")   Wt 73.7 kg (162 lb 6.4 oz)   SpO2 95%   BMI 28.77 kg/m        "

## 2022-03-30 NOTE — PLAN OF CARE
Problem: Plan of Care - These are the overarching goals to be used throughout the patient stay.    Goal: Plan of Care Review/Shift Note  Description: The Plan of Care Review/Shift note should be completed every shift.  The Outcome Evaluation is a brief statement about your assessment that the patient is improving, declining, or no change.  This information will be displayed automatically on your shift note.  3/30/2022 0136 by David Alvarado RN  Outcome: Ongoing, Progressing  3/30/2022 0135 by David Alvarado RN  Outcome: Ongoing, Progressing  Goal: Absence of Hospital-Acquired Illness or Injury  3/30/2022 0136 by David Alvarado RN  Outcome: Ongoing, Progressing  3/30/2022 0135 by David Alvarado RN  Outcome: Ongoing, Progressing  Intervention: Identify and Manage Fall Risk  Recent Flowsheet Documentation  Taken 3/29/2022 2030 by David Alvarado RN  Safety Promotion/Fall Prevention:   activity supervised   bed alarm on   clutter free environment maintained   fall prevention program maintained   increased rounding and observation   increase visualization of patient   lighting adjusted   nonskid shoes/slippers when out of bed   room door open   room near nurse's station   room organization consistent   safety round/check completed   supervised activity  Intervention: Prevent Skin Injury  Recent Flowsheet Documentation  Taken 3/29/2022 2030 by David Alvarado RN  Body Position: position maintained  Intervention: Prevent and Manage VTE (Venous Thromboembolism) Risk  Recent Flowsheet Documentation  Taken 3/29/2022 2030 by David Alvarado RN  Activity Management:   activity adjusted per tolerance   ambulated in room  Goal: Optimal Comfort and Wellbeing  3/30/2022 0136 by David Alvarado RN  Outcome: Ongoing, Progressing  3/30/2022 0135 by David Alvarado RN  Outcome: Ongoing, Progressing  Goal: Readiness for Transition of Care  3/30/2022 0136 by David Alvarado RN  Outcome: Ongoing, Progressing  3/30/2022 0135 by  "David Alvarado, RN  Outcome: Ongoing, Progressing     Problem: Skin or Soft Tissue Infection  Goal: Absence of Infection Signs and Symptoms  Outcome: Ongoing, Progressing       Pt remains anxious and impulsive throughout the shift, multiple very anxious attempts to get out of bed unassisted claiming to have to pee then voiding very small amounts. Pt has been redirectable for the most part, does not use the call light as advised, and states of anxiety and rest have been very labile.   Right lower leg remain reddened and outlined with blanchable redness.  Hips, lower legs, and ankles are edematous with pitting.   Vitals are otherwise stable.   BP (!) 155/84 (BP Location: Left arm)   Pulse 100   Temp 97.1  F (36.2  C) (Oral)   Resp 16   Ht 1.6 m (5' 3\")   Wt 73.7 kg (162 lb 6.4 oz)   SpO2 93%   BMI 28.77 kg/m    Pt had external catheter in place and self removed.   Lungs are clear.   Pt denies pain at this time.   Will continue to monitor, provide reorientation as needed, and follow plan of care.  "

## 2022-03-31 ENCOUNTER — TELEPHONE (OUTPATIENT)
Dept: FAMILY MEDICINE | Facility: CLINIC | Age: 87
End: 2022-03-31
Payer: COMMERCIAL

## 2022-03-31 VITALS
WEIGHT: 162.4 LBS | OXYGEN SATURATION: 100 % | DIASTOLIC BLOOD PRESSURE: 75 MMHG | BODY MASS INDEX: 28.77 KG/M2 | HEART RATE: 97 BPM | RESPIRATION RATE: 18 BRPM | TEMPERATURE: 96.8 F | HEIGHT: 63 IN | SYSTOLIC BLOOD PRESSURE: 145 MMHG

## 2022-03-31 DIAGNOSIS — R60.0 PERIPHERAL EDEMA: ICD-10-CM

## 2022-03-31 DIAGNOSIS — R06.02 SOB (SHORTNESS OF BREATH): ICD-10-CM

## 2022-03-31 DIAGNOSIS — I48.20 CHRONIC ATRIAL FIBRILLATION (H): ICD-10-CM

## 2022-03-31 LAB
ANION GAP SERPL CALCULATED.3IONS-SCNC: 4 MMOL/L (ref 3–14)
BUN SERPL-MCNC: 28 MG/DL (ref 7–30)
CALCIUM SERPL-MCNC: 8 MG/DL (ref 8.5–10.1)
CHLORIDE BLD-SCNC: 107 MMOL/L (ref 94–109)
CO2 SERPL-SCNC: 26 MMOL/L (ref 20–32)
CREAT SERPL-MCNC: 1.26 MG/DL (ref 0.66–1.25)
CRP SERPL-MCNC: 36.3 MG/L (ref 0–8)
GFR SERPL CREATININE-BSD FRML MDRD: 54 ML/MIN/1.73M2
GLUCOSE BLD-MCNC: 92 MG/DL (ref 70–99)
HOLD SPECIMEN: NORMAL
HOLD SPECIMEN: NORMAL
INR PPP: 3.88 (ref 0.85–1.15)
NT-PROBNP SERPL-MCNC: 3893 PG/ML (ref 0–1800)
POTASSIUM BLD-SCNC: 4 MMOL/L (ref 3.4–5.3)
PROCALCITONIN SERPL-MCNC: 0.07 NG/ML
SODIUM SERPL-SCNC: 137 MMOL/L (ref 133–144)

## 2022-03-31 PROCEDURE — 86140 C-REACTIVE PROTEIN: CPT | Performed by: NURSE PRACTITIONER

## 2022-03-31 PROCEDURE — 85610 PROTHROMBIN TIME: CPT | Performed by: NURSE PRACTITIONER

## 2022-03-31 PROCEDURE — 83880 ASSAY OF NATRIURETIC PEPTIDE: CPT | Performed by: NURSE PRACTITIONER

## 2022-03-31 PROCEDURE — 250N000011 HC RX IP 250 OP 636: Performed by: NURSE PRACTITIONER

## 2022-03-31 PROCEDURE — 36415 COLL VENOUS BLD VENIPUNCTURE: CPT | Performed by: NURSE PRACTITIONER

## 2022-03-31 PROCEDURE — 84145 PROCALCITONIN (PCT): CPT | Performed by: NURSE PRACTITIONER

## 2022-03-31 PROCEDURE — 80048 BASIC METABOLIC PNL TOTAL CA: CPT | Performed by: NURSE PRACTITIONER

## 2022-03-31 PROCEDURE — 99239 HOSP IP/OBS DSCHRG MGMT >30: CPT | Performed by: NURSE PRACTITIONER

## 2022-03-31 PROCEDURE — 250N000013 HC RX MED GY IP 250 OP 250 PS 637: Performed by: NURSE PRACTITIONER

## 2022-03-31 RX ORDER — AMOXICILLIN AND CLAVULANATE POTASSIUM 500; 125 MG/1; MG/1
1 TABLET, FILM COATED ORAL EVERY 12 HOURS SCHEDULED
Status: DISCONTINUED | OUTPATIENT
Start: 2022-03-31 | End: 2022-03-31 | Stop reason: HOSPADM

## 2022-03-31 RX ORDER — AMOXICILLIN AND CLAVULANATE POTASSIUM 500; 125 MG/1; MG/1
1 TABLET, FILM COATED ORAL 2 TIMES DAILY
Qty: 10 TABLET | Refills: 0 | Status: SHIPPED | OUTPATIENT
Start: 2022-03-31 | End: 2022-04-05

## 2022-03-31 RX ORDER — LISINOPRIL 2.5 MG/1
2.5 TABLET ORAL DAILY
Qty: 30 TABLET | Refills: 0 | Status: SHIPPED | OUTPATIENT
Start: 2022-03-31 | End: 2022-03-31

## 2022-03-31 RX ORDER — DOXYCYCLINE HYCLATE 50 MG/1
100 CAPSULE ORAL EVERY 12 HOURS SCHEDULED
Status: DISCONTINUED | OUTPATIENT
Start: 2022-03-31 | End: 2022-03-31 | Stop reason: HOSPADM

## 2022-03-31 RX ORDER — DOXYCYCLINE 100 MG/1
100 CAPSULE ORAL EVERY 12 HOURS
Qty: 10 CAPSULE | Refills: 0 | Status: SHIPPED | OUTPATIENT
Start: 2022-03-31 | End: 2022-04-05

## 2022-03-31 RX ORDER — QUETIAPINE FUMARATE 25 MG/1
12.5 TABLET, FILM COATED ORAL
Qty: 10 TABLET | Refills: 0 | Status: SHIPPED | OUTPATIENT
Start: 2022-03-31

## 2022-03-31 RX ORDER — FUROSEMIDE 20 MG
40 TABLET ORAL 2 TIMES DAILY
Qty: 120 TABLET | Refills: 0 | Status: SHIPPED | OUTPATIENT
Start: 2022-03-31

## 2022-03-31 RX ORDER — VANCOMYCIN HYDROCHLORIDE 1 G/200ML
1000 INJECTION, SOLUTION INTRAVENOUS EVERY 24 HOURS
Status: COMPLETED | OUTPATIENT
Start: 2022-03-31 | End: 2022-03-31

## 2022-03-31 RX ADMIN — AMPICILLIN SODIUM AND SULBACTAM SODIUM 3 G: 2; 1 INJECTION, POWDER, FOR SOLUTION INTRAMUSCULAR; INTRAVENOUS at 03:36

## 2022-03-31 RX ADMIN — DILTIAZEM HYDROCHLORIDE 30 MG: 30 TABLET, FILM COATED ORAL at 09:16

## 2022-03-31 RX ADMIN — AMPICILLIN SODIUM AND SULBACTAM SODIUM 3 G: 2; 1 INJECTION, POWDER, FOR SOLUTION INTRAMUSCULAR; INTRAVENOUS at 09:10

## 2022-03-31 RX ADMIN — ACETAMINOPHEN 975 MG: 325 TABLET, FILM COATED ORAL at 04:28

## 2022-03-31 RX ADMIN — FUROSEMIDE 40 MG: 40 TABLET ORAL at 09:16

## 2022-03-31 RX ADMIN — VANCOMYCIN HYDROCHLORIDE 1000 MG: 1 INJECTION, SOLUTION INTRAVENOUS at 12:44

## 2022-03-31 RX ADMIN — POTASSIUM CHLORIDE 10 MEQ: 750 TABLET, EXTENDED RELEASE ORAL at 09:17

## 2022-03-31 RX ADMIN — ACETAMINOPHEN 975 MG: 325 TABLET, FILM COATED ORAL at 12:43

## 2022-03-31 ASSESSMENT — ACTIVITIES OF DAILY LIVING (ADL)
ADLS_ACUITY_SCORE: 11
ADLS_ACUITY_SCORE: 13
ADLS_ACUITY_SCORE: 13
ADLS_ACUITY_SCORE: 11
ADLS_ACUITY_SCORE: 13
ADLS_ACUITY_SCORE: 11
ADLS_ACUITY_SCORE: 13
ADLS_ACUITY_SCORE: 11
ADLS_ACUITY_SCORE: 13
ADLS_ACUITY_SCORE: 11
ADLS_ACUITY_SCORE: 13
ADLS_ACUITY_SCORE: 13
ADLS_ACUITY_SCORE: 11
ADLS_ACUITY_SCORE: 11
ADLS_ACUITY_SCORE: 13

## 2022-03-31 NOTE — PHARMACY-ANTICOAGULATION SERVICE
Clinical Pharmacy - Warfarin Dosing Consult     Pharmacy has been consulted to manage this patient s warfarin therapy.  Indication: Atrial Fibrillation  Therapy Goal: INR 2-3  Warfarin Prior to Admission: Yes  Warfarin PTA Regimen: 5 mg every Mon; 7.5 mg all other days  Recent documented change in oral intake/nutrition: Unknown    INR   Date Value Ref Range Status   03/31/2022 3.88 (H) 0.85 - 1.15 Final   03/30/2022 3.50 (H) 0.85 - 1.15 Final       Recommend HOLDING warfarin dose today.  Pharmacy will monitor Jamal Francis daily and order warfarin doses to achieve specified goal.      Please contact pharmacy as soon as possible if the warfarin needs to be held for a procedure or if the warfarin goals change.

## 2022-03-31 NOTE — PLAN OF CARE
"  Problem: Plan of Care - These are the overarching goals to be used throughout the patient stay.    Goal: Plan of Care Review/Shift Note  Description: The Plan of Care Review/Shift note should be completed every shift.  The Outcome Evaluation is a brief statement about your assessment that the patient is improving, declining, or no change.  This information will be displayed automatically on your shift note.  Outcome: Ongoing, Progressing  Goal: Patient-Specific Goal (Individualized)  Description: You can add care plan individualizations to a care plan. Examples of Individualization might be:  \"Parent requests to be called daily at 9am for status\", \"I have a hard time hearing out of my right ear\", or \"Do not touch me to wake me up as it startles me\".  Outcome: Ongoing, Progressing  Flowsheets (Taken 3/31/2022 0117)  Patient-Specific Goals (Include Timeframe): Pt will display signs of healing with reduced redness to the right leg but 4/1/22  Goal: Absence of Hospital-Acquired Illness or Injury  Outcome: Ongoing, Progressing  Intervention: Identify and Manage Fall Risk  Recent Flowsheet Documentation  Taken 3/31/2022 0100 by David Alvarado, RN  Safety Promotion/Fall Prevention:   activity supervised   bed alarm on   clutter free environment maintained   fall prevention program maintained   lighting adjusted   nonskid shoes/slippers when out of bed   room door open   room near nurse's station   room organization consistent   supervised activity  Taken 3/30/2022 2000 by David Alvarado, RN  Safety Promotion/Fall Prevention:   activity supervised   bed alarm on   clutter free environment maintained   fall prevention program maintained   lighting adjusted   nonskid shoes/slippers when out of bed   room door open   room near nurse's station   room organization consistent   supervised activity  Intervention: Prevent Skin Injury  Recent Flowsheet Documentation  Taken 3/31/2022 0100 by David Alvarado, RN  Body Position: " "position changed independently  Taken 3/30/2022 2000 by David Alvarado RN  Body Position: position changed independently  Intervention: Prevent and Manage VTE (Venous Thromboembolism) Risk  Recent Flowsheet Documentation  Taken 3/31/2022 0100 by David Alvarado RN  VTE Prevention/Management: SCDs (sequential compression devices) off  Activity Management: activity adjusted per tolerance  Taken 3/30/2022 2000 by David Alvarado RN  VTE Prevention/Management: SCDs (sequential compression devices) off  Activity Management: activity adjusted per tolerance  Goal: Optimal Comfort and Wellbeing  Outcome: Ongoing, Progressing  Goal: Readiness for Transition of Care  Outcome: Ongoing, Progressing     Problem: Skin or Soft Tissue Infection  Goal: Absence of Infection Signs and Symptoms  Outcome: Ongoing, Progressing     Pt continues with impulsive behavior, does not use the call light, set off bed alarm several times an hour, required frequent reminders to call for help, frequent reorientation provided. Pt given ativan x1 for anxiety upon request. Pt denies pain, has been voiding hourly, right lower leg remains reddened and outlined, Vitals remain stable.   BP (!) 147/81 (BP Location: Left arm)   Pulse 106   Temp 97.4  F (36.3  C) (Axillary)   Resp 18   Ht 1.6 m (5' 3\")   Wt 73.7 kg (162 lb 6.4 oz)   SpO2 99%   BMI 28.77 kg/m  .  Will continue to monitor, provide reorientation as needed, and follow plan of care.    "

## 2022-03-31 NOTE — TELEPHONE ENCOUNTER
Reason for Call:  Medication or medication refill:    Do you use a [facility]?  Name of the pharmacy and phone number for the current request:  [facility] - [phone]    Name of the medication requested: furosemide (LASIX) tablet 40 mg     Other request: Grand daugher stopped in to have this refilled. Dr. Landrum was the provider that increased the dose. At the time of the original increase he had plenty in his bottle and was told to just finish his current medication and it would be changed at the next refill. The change was not noted and pharmacy cannot make the change without the order. Can this please be changed as soon as possible. He is out.     Can we leave a detailed message on this number? YES    Phone number patient can be reached at: Other phone number:  [phone]    Best Time: any    Call taken on 3/31/2022 at 4:15 PM by Dea Melendez CNA

## 2022-03-31 NOTE — PLAN OF CARE
Goal Outcome Evaluation:    Plan of Care Reviewed With: patient, spouse, grandchild(manjeet)     Overall Patient Progress: no change    Outcome Evaluation: No change noted in redness since day shift. Ambulated x2 400 feet with SBA , walker, and gait belt. Disoriented except to self. Denies pain. had one dose of Ativan for restlessness/anxiety which helped. Sets off alarms frequently. Wife and granddaughter here most of afternoon.

## 2022-03-31 NOTE — PROGRESS NOTES
S-(situation): Patient discharged to [facility] via wheelchair with granddaughterAmparo at about 1530      B-(background): bilateral leg cellulitis, CHF    A-(assessment): Patients legs improving from admission and swelling in legs improving as well.  Will be changing to oral antibiotics this afternoon.     R-(recommendations): Discharge instructions reviewed with wife/granddaughter. Listed belongings gathered and returned to patient.         Discharge Nursing Criteria:     Care Plan and Patient education resolved: Yes    New Medications- pt has been educated about purpose and side effects: Yes    Vaccines  Influenza status verified at discharge:  Not Applicable      MISC  Prescriptions if needed, hard copies sent with patient  NA  Home medications returned to patient: NA  Medication Bin checked and emptied on discharge Yes  Patient reports post-discharge pain management plan is effective: Yes

## 2022-03-31 NOTE — PROGRESS NOTES
S-(situation): status update    B-(background): cellulitis of legs with CHF exacerbation component    A-(assessment): legs are improved with the cellulitis, receding from outlined, more pinkish today versus red. Also legs appear to have less swelling in them. Denies pain. Was up to the bathroom this morning, now back in bed. Family present at this time. Explained to them the plan for today is to give more antibiotic doses, then able to switch to oral and discharge this afternoon after IV antibiotics.    R-(recommendations): Continue to give IV antibiotics, switch to oral antibiotics this afternoon, then discharge back to [place] this afternoon.

## 2022-03-31 NOTE — DISCHARGE SUMMARY
[facility]  Hospitalist Discharge Summary      Date of Admission:  3/29/2022  Date of Discharge:  3/31/2022  Discharging Provider: Dano Marquez NP  Discharge Service: Hospitalist Service    Discharge Diagnoses   Cellulitis of the right lower leg    Follow-ups Needed After Discharge   Follow-up Appointments     Follow-up and recommended labs and tests       Follow up with primary care provider, Vitor Alvarenga, within 7 days   for hospital follow- up and regarding new diagnosis.  The following   labs/tests are recommended: Antibiotics regiment likely to have increased   INR so additional testing may be needed to regulate dosage of warfarin,   increase dose of Lasix will require additional monitoring for hypokalemia.               Unresulted Labs Ordered in the Past 30 Days of this Admission     Date and Time Order Name Status Description    3/31/2022 10:20 AM Procalcitonin In process     3/29/2022 12:39 PM Blood Culture Peripheral Blood Preliminary     3/29/2022 12:39 PM Blood Culture Peripheral Blood Preliminary       These results will be followed up by PCP    Discharge Disposition   Discharged to home  Condition at discharge: Stable    Hospital Course      Jamal Francis is a 89 year old male with a history of anxiety, COPD, atrial fibrillation on chronic warfarin, and recent fall with right clavicular fracture who presented to the ED on 3/29/2022 with concerns of swelling and redness to his lower extremities.  In the ED, patient was afebrile and hemodynamically stable. Patient was not hypoxic although patient's granddaughter notes patient has been more short of breath over the last several weeks. WBC normal but CRP elevated at 41.6. Creatinine mildly elevated from baseline at 1.29. BNP also elevated at 5279. Ultrasound of bilateral lower extremities negative for DVT.Chest xray done with findings consistent with CHF. Patient was given 60 mg IV Lasix and family reports improvement  in lower extremity swelling since he received this. He was started on Unasyn and vancomycin with converted to doxycycline and Augmentin for oral cellulitis treatment.    After inpatient treatment with IV antibiotics cellulitis presentation has nearly resolved leading to transition towards oral antibiotics.   With patient's newly diagnosed CHF is recommended that he have ACE/ARB treatment that he has been started on 2.5 mg lisinopril daily.     Principal Problem:       Cellulitis of right leg  Patient presented with an 8 day history of swelling to bilateral lower extremities with right worse than left. Patient with erythema from below right knee down to foot with a particularly red area just below the right knee. Area is warm to the touch and patient does note some discomfort with palpation. Was seen in the ED 3/24 and started on Keflex. Patient notes ongoing worsening of redness despite Keflex.  Patient return to the emergency room on 3/29 for increased redness and pain at the right lower extremity.  He was admitted for IV antibiotics and continued evaluation.  Plan:   - Started on IV vancomycin and Unasyn which was transitioned to oral doxycycline and Augmentin for 5 additional days.  - CRP and BNP have improved since admission  - Patient to follow-up with PCP in 7 to 10 days to evaluate resolution of cellulitis     Active Problems:       Heart failure with preserved ejection fracture, NYHA class II    Bilateral leg edema  Assessment: Patient with increased lower extremity swelling over last 7-10 days per granddaughter report. Granddaughter also notes patient with some increased shortness of breath although he was not hypoxic in the ED. Chest xray with findings concerning for CHF and BNP elevated upon admission at 5279. Patient was started on oral Lasix 40 mg BID after ED visit 3/25 but granddaughter notes swelling has worsened. Patient did receive IV Lasix in the ED and family notes lower extremity swelling  improved after he received this.  While he has been admitted to the hospital 40 mg of Lasix twice daily has continued with additional resolution of bilateral lower extremity edema.  Plan:   - We will continue Lasix 40 mg BID on discharge  - Inpatient echocardiogram showed preserved EF of 55 to 60%.  Significant for +2 mitral valve regurgitation, +2 tricuspid valve regurgitation and +1 aortic valve regurgitation.  - CHF bundle was initiated but due to patient's preserved EF ACE/ARB these medications were not started.  -Patient should follow up with PCP within 7 to 10 days for continued potassium monitoring related to increased dose of Lasix, potassium 4.0 upon discharge       Atrial fibrillation (H)    Long-term (current) use of anticoagulants [Z79.01]  Assessment: Chronic and stable on Cardizem 30 mg daily and on warfarin for anticoagulation.   Plan:   - Continue Cardizem on discharge  - Continue outpatient warfarin dosing and follow-up with PCP      COPD (chronic obstructive pulmonary disease) (H)  Assessment: Chronic and stable. Has albuterol inhaler at home for shortness of breath/wheezing but has not used for some time. No wheezing heard on exam  Plan:   -Follow-up with PCP, new Lasix dosing should benefit        Anxiety  Assessment: Granddaughter notes patient with some confusion at baseline and has a great deal of anxiety. Granddaughter notes patient is often up every hour at night pacing due to anxiety. Takes gabapentin 300 mg once daily around 1830 in the evening to assist with anxiety. Granddaughter requests something be given to patient to assist with night time anxiety. Discussed low dose of Seroquel and family in agreement. Patient received IV Ativan in the ED and is groggy upon exam.   Plan:   - Continue gabapentin 300 mg once daily on discharge  -Continue Seroquel 12.5 mg once daily at bedtime to help promote sleep and nocturnal anxiety.       History of fracture of clavicle-3/11/22  Assessment:  Patient with recent fall in his kitchen at home. Presented to ED and noted to have right clavicle fracture. Family notes they were told he had two rib fractures from that fall but imaging reviewed and showed old/healed rib fractures with no evidence of acute rib fractures.   Plan:   -Continue with outpatient plan that was established prior    Consultations This Hospital Stay   PHARMACY TO DOSE VANCO  PHARMACY TO DOSE WARFARIN  CARE MANAGEMENT / SOCIAL WORK IP CONSULT    Code Status   Full Code    Time Spent on this Encounter   I, Dano Marquez NP, personally saw the patient today and spent greater than 30 minutes discharging this patient.       Dano Marquez NP  [address] MEDICAL SURGICAL  [address] [place] [address]  Phone: [phone]  ______________________________________________________________________    Physical Exam   Vital Signs: Temp: 96.8  F (36  C) Temp src: Oral BP: (!) 145/75 Pulse: 97   Resp: 18 SpO2: 100 % O2 Device: None (Room air)    Weight: 162 lbs 6.4 oz  Constitutional: awake, cooperative, no apparent distress, and appears stated age  Respiratory: No increased work of breathing, good air exchange, clear to auscultation bilaterally  Cardiovascular: normal apical pulses  and irregularly irregular rhythm  GI:  normal bowel sounds, soft, non-distended, non-tender  Skin: no bruising or bleeding, normal skin color, texture, turgor and right lower extremity with normalized color and minimal edema noted.  Musculoskeletal: There is no redness, warmth, or swelling of the joints.  Full range of motion noted.  Motor strength is 5 out of 5 all extremities bilaterally.  Tone is normal.  Neurologic: Awake, alert.  Motor is 4 out of 5 bilaterally.  Easily able to respond to questions.      Primary Care Physician   Vitor Alvarenga    Discharge Orders      Reason for your hospital stay    You were seen within the hospital for right lower leg cellulitis and shortness of  breath.     Follow-up and recommended labs and tests     Follow up with primary care provider, Vitor Alvarenga, within 7 days for hospital follow- up and regarding new diagnosis.  The following labs/tests are recommended: Antibiotics regiment likely to have increased INR so additional testing may be needed to regulate dosage of warfarin, increase dose of Lasix will require additional monitoring for hypokalemia.     Activity    Your activity upon discharge: activity as tolerated and no driving while taking Seroquel.     Resume Home Care Services     Miscellaneous DME Order    DME Documentation:   Describe the reason for need to support medical necessity: Patient has history of rolling out of bed at night, after marked side rail provided by family to help patient not injure himself while sleeping.    I, the undersigned, certify that the above prescribed supplies are medically necessary for this patient and is both reasonable and necessary in reference to accepted standards of medical and necessary in reference to accepted standards of medical practice in the treatment of this patient's condition and is not prescribed as a convenience.     Diet    Follow this diet upon discharge: Orders Placed This Encounter      Low Saturated Fat Na <2400 mg       Significant Results and Procedures   Results for orders placed or performed during the hospital encounter of 03/29/22   US Lower Extremity Venous Duplex Bilateral    Narrative    ULTRASOUND BILATERAL LOWER EXTREMITIES VENOUS DUPLEX March 29, 2022  2:52 PM    CLINICAL HISTORY: Leg swelling, rule out deep vein thrombosis. Patient  is currently taking warfarin.    TECHNIQUE: Venous Duplex ultrasound of bilateral lower extremities  with and without compression, augmentation and duplex. Color flow and  spectral Doppler with waveform analysis performed.    COMPARISON: None.    FINDINGS: Exam includes the common femoral, femoral, popliteal veins  as well as segmentally visualized  deep calf veins and greater  saphenous vein.     RIGHT: No deep vein thrombosis. No superficial thrombophlebitis. No  popliteal cyst. Mild edema in the subcutaneous adipose tissues of the  lower leg.    LEFT: No deep vein thrombosis. No superficial thrombophlebitis. No  popliteal cyst. Mild edema in the subcutaneous adipose tissues of the  lower leg.    The waveforms of the venous structures in the bilateral lower  extremities are pulsatile. This indicates increased central venous  pressure as can be seen with congestive heart failure or other  etiologies.      Impression    IMPRESSION:  1.  No deep venous thrombosis in either lower extremity.  2.  There is mild edema in the subcutaneous contains adipose tissues  of the bilateral lower legs.  3.  Waveforms in the venous structures of bilateral lower extremities  are more pulsatile than typically seen indicating increased central  venous pressure as can be seen with congestive heart failure,  pulmonary artery hypertension, pulmonary artery embolism as well as  other etiologies. Recommend clinical correlation.    I discussed the lack of deep venous thrombosis and the presence of  increased pulsatility in the waveforms with Miladys Francis on  3/29/2022 at 3:17 PM.    GLENDA LE MD         SYSTEM ID:  [id number]   XR Chest Port 1 View    Narrative    CHEST ONE VIEW PORTABLE  3/29/2022 3:53 PM     HISTORY: Congestive heart failure.    COMPARISON: Chest x-rays dated 3/25/2022.    FINDINGS:  Diffusely increased pulmonary markings bilaterally have  worsened since the prior study dated 3/25/2022. The cardiac silhouette  remains enlarged. No pneumothorax is identified. No definite pleural  fluid collection is seen, although small pleural fluid collections are  considered likely. No acute fracture or malalignment is seen. There  are moderate to severe degenerative changes of the bilateral  glenohumeral joints. Mild shift of the trachea to the left is seen in  the upper  mediastinum. This was also seen on the prior CT examination  dated 3/11/2022.      Impression    IMPRESSION:  1. Findings are concerning for worsening congestive heart failure  given the increasing pulmonary interstitial markings and given the  cardiomegaly, although no definite pleural fluid collections are seen.  These findings could also represent worsening pulmonary infiltrates.  2. No other significant changes.    GLENDA LE MD         SYSTEM ID:  [id number]   Echocardiogram Complete     Value    LVEF  55-60%    Narrative    [id number]  [id number]  [id number]  [id number]^[email or web address]     [facility]  Echocardiography Laboratory  [address] [place] [address]     Name: BRANDIE ZAZUETA  MRN: [id number]  : 1932  Study Date: 2022 09:19 AM  Age: 89 yrs  Gender: Male  Patient Location: [facility]  Reason For Study: Edema  History: afib, chf, htn, copd  Ordering Physician: TSERING MUNOZ  Referring Physician: Vitor Alvarenga  Performed By: Autumn Bauman     BSA: 1.8 m2  Height: 63 in  Weight: 170 lb  HR: 80  BP: 143/87 mmHg  ______________________________________________________________________________  Procedure  Complete Portable Echo Adult.  ______________________________________________________________________________  Interpretation Summary     The rhythm was atrial fibrillation.  Left ventricular systolic function is normal.  The visual ejection fraction is 55-60%.  The left ventricle is normal in size.  The right ventricle is normal in structure, function and size.  There is mod-severe biatrial enlargement.  There is moderate (2+) mitral regurgitation.  There is mild (1+) aortic regurgitation.     No signficant change since 2011     ______________________________________________________________________________  Left Ventricle  The left ventricle is normal in size. There is normal left ventricular wall  thickness. Left ventricular systolic function is normal. The visual  ejection  fraction is 55-60%. Left ventricular diastolic function is indeterminate. No  regional wall motion abnormalities noted. There is no thrombus seen in the  left ventricle.     Right Ventricle  The right ventricle is normal in structure, function and size. There is no  mass or thrombus in the right ventricle.     Atria  There is mod-severe biatrial enlargement. There is no atrial shunt seen. The  left atrial appendage is not well visualized.     Mitral Valve  The mitral valve leaflets appear normal. There is no evidence of stenosis,  fluttering, or prolapse. There is moderate (2+) mitral regurgitation. There is  no mitral valve stenosis.     Tricuspid Valve  Normal tricuspid valve. There is moderate (2+) tricuspid regurgitation. Right  ventricle systolic pressure estimate normal. There is no tricuspid stenosis.     Aortic Valve  The aortic valve is trileaflet. There is mild (1+) aortic regurgitation. No  aortic stenosis is present.     Pulmonic Valve  Normal pulmonic valve. There is trace pulmonic valvular regurgitation. There  is no pulmonic valvular stenosis.     Vessels  The aortic root is normal size. Normal size ascending aorta. The inferior vena  cava is normal. The pulmonary artery is normal size.     Pericardium  The pericardium appears normal. There is no pleural effusion.     Rhythm  The rhythm was atrial fibrillation.  ______________________________________________________________________________  MMode/2D Measurements & Calculations  IVSd: 0.99 cm     LVIDd: 4.3 cm  LVIDs: 4.0 cm  LVPWd: 1.2 cm  FS: 6.2 %  LV mass(C)d: 156.6 grams  LV mass(C)dI: 86.8 grams/m2  Ao root diam: 3.5 cm  LA dimension: 5.8 cm  asc Aorta Diam: 3.4 cm  LA/Ao: 1.7  RWT: 0.55     Doppler Measurements & Calculations  MV E max bill: 101.1 cm/sec  MV dec time: 0.29 sec  Ao V2 max: 134.0 cm/sec  Ao max P.2 mmHg  LV V1 max P.9 mmHg  LV V1 max: 85.1 cm/sec  MR ERO: 0.54 cm2  MR volume: 71.3 ml  PA V2 max: 74.9 cm/sec  PA  max P.2 mmHg  PA acc time: 0.09 sec  TR max ridge: 281.3 cm/sec  TR max P.7 mmHg     AV Ridge Ratio (DI): 0.64  Medial E/e': 10.5     ______________________________________________________________________________  Report approved by: Dr. Howard Williamson 2022 11:48 AM               Discharge Medications   Current Discharge Medication List      START taking these medications    Details   amoxicillin-clavulanate (AUGMENTIN) 500-125 MG tablet Take 1 tablet by mouth 2 times daily for 5 days  Qty: 10 tablet, Refills: 0    Associated Diagnoses: Cellulitis of right leg      doxycycline hyclate (VIBRAMYCIN) 100 MG capsule Take 1 capsule (100 mg) by mouth every 12 hours for 5 days  Qty: 10 capsule, Refills: 0    Associated Diagnoses: Cellulitis of right leg      QUEtiapine (SEROQUEL) 25 MG tablet Take 0.5 tablets (12.5 mg) by mouth nightly as needed (for sleep)  Qty: 10 tablet, Refills: 0    Associated Diagnoses: Anxiety         CONTINUE these medications which have NOT CHANGED    Details   albuterol (PROAIR HFA/PROVENTIL HFA/VENTOLIN HFA) 108 (90 Base) MCG/ACT inhaler Inhale 2 puffs into the lungs every 6 hours as needed for shortness of breath / dyspnea or wheezing  Qty: 18 g, Refills: 0    Comments: Pharmacy may dispense brand covered by insurance (Proair, or proventil or ventolin or generic albuterol inhaler)  Associated Diagnoses: Pulmonary emphysema, unspecified emphysema type (H); Chronic atrial fibrillation (H)      diltiazem (CARDIZEM) 30 MG tablet Take 1 tablet (30 mg) by mouth daily  Qty: 90 tablet, Refills: 1    Associated Diagnoses: Persistent atrial fibrillation (H)      furosemide (LASIX) 20 MG tablet Take 2 tablets (40 mg) by mouth 2 times daily  Qty: 30 tablet, Refills: 0    Associated Diagnoses: Chronic atrial fibrillation (H); SOB (shortness of breath); Peripheral edema      gabapentin (NEURONTIN) 300 MG capsule Take 1 capsule (300 mg) by mouth At Bedtime  Qty: 30 capsule, Refills: 0     Associated Diagnoses: Anxiety      multivitamin w/minerals (MULTI-VITAMIN) tablet Take 1 tablet by mouth daily      potassium chloride ER (KLOR-CON M) 10 MEQ CR tablet Take 1 tablet (10 mEq) by mouth 2 times daily  Qty: 60 tablet, Refills: 0    Associated Diagnoses: Chronic atrial fibrillation (H); SOB (shortness of breath); Peripheral edema      warfarin ANTICOAGULANT (COUMADIN) 2.5 MG tablet Take 5 mg (2 tabs) Mon and 7.5 mg (3 tabs) all other days or as directed by coumadin clinic.  Qty: 90 tablet, Refills: 0    Associated Diagnoses: Persistent atrial fibrillation (H)      ipratropium - albuterol 0.5 mg/2.5 mg/3 mL (DUONEB) 0.5-2.5 (3) MG/3ML neb solution Take 1 vial (3 mLs) by nebulization every 6 hours as needed for shortness of breath / dyspnea or wheezing  Qty: 90 vial, Refills: 1    Associated Diagnoses: COPD exacerbation (H)      order for DME Nebulizer machine and tubing/cup  Qty: 1 Device, Refills: 0    Associated Diagnoses: COPD exacerbation (H)         STOP taking these medications       cephALEXin (KEFLEX) 500 MG capsule Comments:   Reason for Stopping:             Allergies   Allergies   Allergen Reactions     Sulfa Drugs      dizziness

## 2022-03-31 NOTE — DISCHARGE INSTRUCTIONS
Please  prescriptions at the retail pharmacy on first floor at time of discharge to bring to [facility].

## 2022-03-31 NOTE — PROGRESS NOTES
Care Management Discharge Note    Discharge Date: 03/31/2022     Discharge Disposition: Assisted Living - Return to [facility]     Discharge Services:  None    Discharge DME:      Discharge Transportation: family or friend will provide    Private pay costs discussed: Not applicable    PAS Confirmation Code:      Patient/family educated on Medicare website which has current facility and service quality ratings:      Education Provided on the Discharge Plan:      Persons Notified of Discharge Plans: Grand-daughter, Amparo    Patient/Family in Agreement with the Plan: yes    Handoff Referral Completed: Yes    Additional Information:  Patient medically ready for discharge back to [facility].  Writer called and notified, Amparo, at [facility], of patient's return.  Gave bedside nurse phone number for [facility] to call with update.  Family transport.  Patient's is High Risk for re-admission.  Follow up appt scheduled with PCP for 4/11/22 @ 1500.    KATHIE Connolly  [facility] [phone]/ [facility] [phone]  Care Management

## 2022-04-01 ENCOUNTER — PATIENT OUTREACH (OUTPATIENT)
Dept: CARE COORDINATION | Facility: CLINIC | Age: 87
End: 2022-04-01
Payer: COMMERCIAL

## 2022-04-01 ENCOUNTER — TELEPHONE (OUTPATIENT)
Dept: ANTICOAGULATION | Facility: CLINIC | Age: 87
End: 2022-04-01
Payer: COMMERCIAL

## 2022-04-01 ENCOUNTER — DOCUMENTATION ONLY (OUTPATIENT)
Dept: OTHER | Facility: CLINIC | Age: 87
End: 2022-04-01
Payer: COMMERCIAL

## 2022-04-01 NOTE — TELEPHONE ENCOUNTER
ANTICOAGULATION  MANAGEMENT: Discharge Review    Jamal Francis chart reviewed for anticoagulation continuity of care    Hospital Admission on 3/29-3/31 for anxiety, shortness of breath, cellulitis .    Discharge disposition: Home    Results:    Recent labs: (last 7 days)     03/29/22  1253 03/30/22  0610 03/31/22  0605   INR 3.31* 3.50* 3.88*     Anticoagulation inpatient management:     coumadin held on 3/30 and 3/31    Anticoagulation discharge instructions:     Warfarin dosing: home regimen continued - will need to verify with denise Christensen    Bridging: No   INR goal change: No      Medication changes affecting anticoagulation: Yes: Seroquel - can increase bleeding and INR, doxycycline and Augmentin (x5 days- 3/31-4/4) - both can increase bleeding    Additional factors affecting anticoagulation: Yes: cellulitis     Plan     Recommend to adjust dose to take 5 mg on Friday, 7.5 mg Sat, Sun, recheck Monday     VM left for granddaughter Amparo to call [facility] back.     Anticoagulation Calendar updated    Livia Cassidy RN

## 2022-04-01 NOTE — TELEPHONE ENCOUNTER
Attempted to contact Amparo, pt's granddaughter. No answer. VM left to call [facility] back.   I did advise 5 mg Friday, 7.5 mg Sat and Sun, and recheck an INR on Monday (see note below- pt on two new meds).   I advised a call back to confirm she for VM    Livia Cassidy RN

## 2022-04-01 NOTE — PROGRESS NOTES
Clinic Care Coordination Contact  [facility]/Voicemail       Clinical Data: CHW Outreach  Outreach attempted x 1. Left message on patient's voicemail with call back information and requested return call.    Plan: CHW will try to reach patient again in 1-2 business days.    Lelo Ann  Community Health Worker   [facility]  Care Coordination  [place], [place], [place], [place], [facility]  [email or web address]   Office: [phone]

## 2022-04-01 NOTE — TELEPHONE ENCOUNTER
Attempted to contact pt's granddaughter Amparo regarding recent hospitalization (see below).     Pt was on IV vanco and unasyn while hospitalized, and started on 5 days of doxycycline and Augmentin (3/31-4/4)- both can affect INR.   I would advise that pt take 5 mg tonight, 7.5 mg Sat, Sun, recheck on Monday.  Calendar has been updated with dosing while hospitalized and new advised dosing over the weekend  Livia Cassidy RN

## 2022-04-01 NOTE — LETTER
[facility] CARE COORDINATION  [address] [address]    April 4, 2022    Jamal Francis  [address] [address]      Dear Jamal,    I am a clinic community health worker who works with Vitor Alvarenga PA-C at [facility]. I have been trying to reach you recently to introduce Clinic Care Coordination and to see if there was anything I could assist you with.  Below is a description of clinic care coordination and how I can further assist you.      The clinic care coordination team is made up of a registered nurse,  and community health worker who understand the health care system. The goal of clinic care coordination is to help you manage your health and improve access to the health care system in the most efficient manner. The team can assist you in meeting your health care goals by providing education, coordinating services, strengthening the communication among your providers and supporting you with any resource needs.    Please feel free to contact me at [phone] with any questions or concerns. We are focused on providing you with the highest-quality healthcare experience possible and that all starts with you.     Sincerely,     Lelo Ann  Community Health Worker   [facility]  Care Coordination  [place], [place], [facility]  [email or web address]  [email or web address]   Office: [phone]

## 2022-04-03 LAB
BACTERIA BLD CULT: NO GROWTH
BACTERIA BLD CULT: NO GROWTH

## 2022-04-04 NOTE — PROGRESS NOTES
Clinic Care Coordination Contact  [facility]/Voicemail       Clinical Data: CHW Outreach  Outreach attempted x 2. Left message on patient's voicemail with call back information and requested return call.    Plan: CHW will send unable to contact letter with care coordinator contact information via [employer]. CHW will do no further outreaches at this time.    Lelo Ann  Community Health Worker   [facility]  Care Coordination  [facility], [facility]  [email or web address]   Office: [phone]

## 2022-04-04 NOTE — TELEPHONE ENCOUNTER
Attempted to contact Amparo, pt's granddaughter, again. No answer, VM left to call [facility] back   Also tried to call son, Demar-  box ix full, unable to leave a VM.     From note below- pt on Augmentin and doxycycline 3/31 - 4/4, which can both affect INR. I did leave a VM for Amparo on Fri to have pt take 5 mg Fri 4/1 and 7.5 mg Sat and Sun and recheck INR on Monday (hospitlaized 3/29-3/31).     Will need to verify dosing and have INR checked ASAP    Livia Cassidy RN

## 2022-04-05 NOTE — TELEPHONE ENCOUNTER
I got a hold of Demar, son, who said that Amparo is not in [place] right now. The soonest pt could get to the clinic for an INR is Friday. Appt tyler Cassidy RN

## 2022-04-08 ENCOUNTER — LAB (OUTPATIENT)
Dept: LAB | Facility: CLINIC | Age: 87
End: 2022-04-08
Payer: COMMERCIAL

## 2022-04-08 ENCOUNTER — ANTICOAGULATION THERAPY VISIT (OUTPATIENT)
Dept: ANTICOAGULATION | Facility: CLINIC | Age: 87
End: 2022-04-08

## 2022-04-08 DIAGNOSIS — I48.19 PERSISTENT ATRIAL FIBRILLATION (H): ICD-10-CM

## 2022-04-08 DIAGNOSIS — Z79.01 LONG TERM CURRENT USE OF ANTICOAGULANT THERAPY: ICD-10-CM

## 2022-04-08 DIAGNOSIS — I48.91 ATRIAL FIBRILLATION (H): ICD-10-CM

## 2022-04-08 DIAGNOSIS — I48.91 ATRIAL FIBRILLATION, UNSPECIFIED TYPE (H): ICD-10-CM

## 2022-04-08 DIAGNOSIS — I48.91 ATRIAL FIBRILLATION, UNSPECIFIED TYPE (H): Primary | ICD-10-CM

## 2022-04-08 LAB — INR BLD: 4 (ref 0.9–1.1)

## 2022-04-08 PROCEDURE — 85610 PROTHROMBIN TIME: CPT

## 2022-04-08 PROCEDURE — 36416 COLLJ CAPILLARY BLOOD SPEC: CPT

## 2022-04-08 NOTE — PROGRESS NOTES
ANTICOAGULATION MANAGEMENT     Jamal Francis 89 year old male is on warfarin with supratherapeutic INR result. (Goal INR 2.0-3.0)    Recent labs: (last 7 days)     04/08/22  1321   INR 4.0*       ASSESSMENT       Source(s): Chart Review and Patient/Caregiver Call - estelle Christensen       Warfarin doses taken: Warfarin taken as instructed and per Amparo, pt has been taking his MD since being discharged from the hospital.     Diet: No new diet changes identified    New illness, injury, or hospitalization: Yes: 3/29-3/31 - cellulitis     Medication/supplement changes: Augmentin and doxycycline done Tues 4/5, per Amparo     Signs or symptoms of bleeding or clotting: No    Previous INR: Supratherapeutic    Additional findings: None       PLAN     Recommended plan for temporary change(s) affecting INR     Dosing Instructions: hold tonight, then resume dose  with next INR in 3 days       Summary  As of 4/8/2022    Full warfarin instructions:  4/8: Hold; Otherwise 5 mg every Mon; 7.5 mg all other days   Next INR check:  4/11/2022             Telephone call with  Amparo  who verbalizes understanding and agrees to plan    Lab visit scheduled    Education provided: Please call back if any changes to your diet, medications or how you've been taking warfarin, Goal range and significance of current result, Potential interaction between warfarin and Augmentin and Doxycycline  and Monitoring for bleeding signs and symptoms    Plan made per [facility] anticoagulation protocol    Livia Cassidy, RN  Anticoagulation Clinic  4/8/2022    _______________________________________________________________________     Anticoagulation Episode Summary     Current INR goal:  2.0-3.0   TTR:  28.4 % (11.8 mo)   Target end date:  Indefinite   Send INR reminders to:  [facility]    Indications    Atrial fibrillation (H) [I48.91]  Long-term (current) use of anticoagulants [Z79.01] [Z79.01]  Atrial fibrillation  unspecified type (H)  [I48.91]  Persistent atrial fibrillation (H) [I48.19]           Comments:           Anticoagulation Care Providers     Provider Role Specialty Phone number    Catarino Landrum DO Referring Internal Medicine [phone]    Vitor Alvarenga PA-C Referring Internal Medicine [phone]

## 2022-04-08 NOTE — PROGRESS NOTES
ANTICOAGULATION MANAGEMENT     Jamal Francis 89 year old male is on warfarin with supratherapeutic INR result. (Goal INR 2.0-3.0)    Recent labs: (last 7 days)     04/08/22  1321   INR 4.0*       ASSESSMENT       Source(s): Chart Review    Previous INR was Supratherapeutic    Medication, diet, health changes since last INR - doxycycline and Augmentin 3/31-4/4 (will need to see if he is currently done)             PLAN     Unable to reach Amparo, pt's granddaughter, today.    VM left to call [facility] back, will try again later.     Follow up required to confirm warfarin dose taken and assess for changes    Livia Cassidy, RN  Anticoagulation Clinic  4/8/2022

## 2022-04-11 ENCOUNTER — LAB (OUTPATIENT)
Dept: LAB | Facility: CLINIC | Age: 87
End: 2022-04-11
Payer: COMMERCIAL

## 2022-04-11 ENCOUNTER — ANTICOAGULATION THERAPY VISIT (OUTPATIENT)
Dept: ANTICOAGULATION | Facility: CLINIC | Age: 87
End: 2022-04-11

## 2022-04-11 ENCOUNTER — HOSPITAL ENCOUNTER (OUTPATIENT)
Dept: GENERAL RADIOLOGY | Facility: CLINIC | Age: 87
Discharge: HOME | End: 2022-04-11
Attending: PHYSICIAN ASSISTANT | Admitting: PHYSICIAN ASSISTANT
Payer: COMMERCIAL

## 2022-04-11 ENCOUNTER — OFFICE VISIT (OUTPATIENT)
Dept: FAMILY MEDICINE | Facility: CLINIC | Age: 87
End: 2022-04-11
Payer: COMMERCIAL

## 2022-04-11 VITALS
SYSTOLIC BLOOD PRESSURE: 124 MMHG | BODY MASS INDEX: 27.12 KG/M2 | DIASTOLIC BLOOD PRESSURE: 76 MMHG | HEART RATE: 82 BPM | OXYGEN SATURATION: 100 % | WEIGHT: 153.13 LBS | TEMPERATURE: 97.8 F

## 2022-04-11 DIAGNOSIS — I48.91 ATRIAL FIBRILLATION, UNSPECIFIED TYPE (H): ICD-10-CM

## 2022-04-11 DIAGNOSIS — I48.19 PERSISTENT ATRIAL FIBRILLATION (H): ICD-10-CM

## 2022-04-11 DIAGNOSIS — Z79.01 LONG TERM CURRENT USE OF ANTICOAGULANT THERAPY: ICD-10-CM

## 2022-04-11 DIAGNOSIS — I48.91 ATRIAL FIBRILLATION (H): ICD-10-CM

## 2022-04-11 DIAGNOSIS — I48.91 ATRIAL FIBRILLATION (H): Primary | ICD-10-CM

## 2022-04-11 DIAGNOSIS — I50.30 HEART FAILURE WITH PRESERVED EJECTION FRACTION, NYHA CLASS II (H): ICD-10-CM

## 2022-04-11 DIAGNOSIS — S42.011D CLOSED ANTERIOR DISPLACED FRACTURE OF STERNAL END OF RIGHT CLAVICLE WITH ROUTINE HEALING, SUBSEQUENT ENCOUNTER: ICD-10-CM

## 2022-04-11 DIAGNOSIS — B35.1 ONYCHOMYCOSIS: ICD-10-CM

## 2022-04-11 DIAGNOSIS — L03.119 CELLULITIS OF LOWER EXTREMITY, UNSPECIFIED LATERALITY: Primary | ICD-10-CM

## 2022-04-11 LAB — INR BLD: 2 (ref 0.9–1.1)

## 2022-04-11 PROCEDURE — 73000 X-RAY EXAM OF COLLAR BONE: CPT | Mod: RT

## 2022-04-11 PROCEDURE — 99495 TRANSJ CARE MGMT MOD F2F 14D: CPT | Performed by: PHYSICIAN ASSISTANT

## 2022-04-11 PROCEDURE — 36416 COLLJ CAPILLARY BLOOD SPEC: CPT

## 2022-04-11 PROCEDURE — 85610 PROTHROMBIN TIME: CPT

## 2022-04-11 RX ORDER — DOXYCYCLINE 100 MG/1
100 CAPSULE ORAL 2 TIMES DAILY
Qty: 20 CAPSULE | Refills: 0 | Status: SHIPPED | OUTPATIENT
Start: 2022-04-11 | End: 2022-04-21

## 2022-04-11 ASSESSMENT — PAIN SCALES - GENERAL: PAINLEVEL: NO PAIN (0)

## 2022-04-11 NOTE — PROGRESS NOTES
ANTICOAGULATION MANAGEMENT     Jamal Francis 89 year old male is on warfarin with therapeutic INR result. (Goal INR 2.0-3.0)    Recent labs: (last 7 days)     04/11/22  1432   INR 2.0*       ASSESSMENT       Source(s): Chart Review and Patient/Caregiver Call       Warfarin doses taken: Warfarin taken as instructed    Diet: No new diet changes identified    New illness, injury, or hospitalization: No    Medication/supplement changes: None noted    Signs or symptoms of bleeding or clotting: No    Previous INR: Supratherapeutic  Additional findings: Pt will be moving to [place] on Wed. INR lab appt scheduled for there but we will still manage at this time     PLAN     Recommended plan for no diet, medication or health factor changes affecting INR     Dosing Instructions: decrease your warfarin dose (5% change) with next INR in 4 days       Summary  As of 4/11/2022    Full warfarin instructions:  5 mg every Mon, Wed; 7.5 mg all other days   Next INR check:  4/15/2022             Telephone call with  Granddaughter Amparo who verbalizes understanding and agrees to plan and who agrees to plan and repeated back plan correctly    Lab visit scheduled    Education provided: Please call back if any changes to your diet, medications or how you've been taking warfarin, Goal range and significance of current result, Importance of therapeutic range, Importance of following up at instructed interval and Importance of taking warfarin as instructed    Plan made per [facility] anticoagulation protocol    Marilee Feliciano RN  Anticoagulation Clinic  4/11/2022    _______________________________________________________________________     Anticoagulation Episode Summary     Current INR goal:  2.0-3.0   TTR:  28.8 % (11.8 mo)   Target end date:  Indefinite   Send INR reminders to:  [facility]    Indications    Atrial fibrillation (H) [I48.91]  Long-term (current) use of anticoagulants [Z79.01] [Z79.01]  Atrial  fibrillation  unspecified type (H) [I48.91]  Persistent atrial fibrillation (H) [I48.19]           Comments:           Anticoagulation Care Providers     Provider Role Specialty Phone number    Catarino Landrum DO Referring Internal Medicine [phone]    Vitor Alvarenga PA-C Referring Internal Medicine [phone]

## 2022-04-11 NOTE — PROGRESS NOTES
Assessment & Plan     Cellulitis of lower extremity, unspecified laterality  Patient's symptoms are significantly improved following completion of the augmentin course provided upon discharge from [facility]. On exam today he has poorly defined erythema along the medial right lower leg and the lateral left lower leg. These areas are warm to touch and tender. This is consistent with findings on admission. The patient will complete one additional course of doxycycline. Informed son to monitor for diarrhea as repeated use of antibiotics increases risks for C diff infection.   - doxycycline hyclate (VIBRAMYCIN) 100 MG capsule; Take 1 capsule (100 mg) by mouth in the morning and 1 capsule (100 mg) in the evening. Do all this for 10 days.    Closed anterior displaced fracture of sternal end of right clavicle with routine healing, subsequent encounter  Repeating imaging today as the patient has refused to wear his arm sling and complains of persistent pain. He is only 2-3 weeks into the healing process. Initial impression of imaging shows normal articulation with acromion and sternum. Mildly displaced fracture in the medial 1/3 of clavicle still present. Will await final interpretation from radiologist.   - XR Clavicle Right; Future    Onychomycosis  Patient has significant thickening of the left 1st toenail. In addition, there is callus formation of the distal end of the 1st big toe suggesting that his shoes are too tight. As the patient will be moving this Wednesday, 04/13, to [place] to be close to son. I have put in a podiatrist referral with [facility] so that the patient can establish with specialist to address the fungal infection as well as monitor toe to ensure ulcer does not develop.   - Orthopedic  Referral; Future    Heart failure with preserved ejection fraction, NYHA class II (H)  Patient's weight is decreased from 162 to 153 since starting diuretic. He was given lasix during hospitalization.  He has been tolerating this medication without adverse effect. Renal function was slightly reduced at last metabolic panel on 03/31 - GFR 54.   - Adult Cardiology Eugeniaal Kristin Referral; Future    Vitor Alvarenga PA-C  [facility] ARCENIO Berry is a 89 year old who presents for the following health issues     HPI       Hospital Follow-up Visit:    Hospital/Nursing Home/IP Rehab Facility: [facility]  Date of Admission: 03/29/2022  Date of Discharge: 03/31/2022  Reason(s) for Admission: Cellulitis of the right lower leg      Was your hospitalization related to COVID-19? Not sure   Problems taking medications regularly:  None  Medication changes since discharge: None  Problems adhering to non-medication therapy:  None    Summary of hospitalization:  [facility] discharge summary reviewed  Diagnostic Tests/Treatments reviewed.  Follow up needed: Cardiology  Other Healthcare Providers Involved in Patient s Care:         Specialist appointment - Patient is moving with son to [place] and will be establishing with [facility] PCP and specialty  Update since discharge: stable.   Post Discharge Medication Reconciliation: discharge medications reconciled, continue medications without change.  Plan of care communicated with patient and family     Patient is a 89 year old male who is brought in by son for follow up on recent hospitalization. He was admitted to the [facility] from 03/29-03/31 for cellulitis in lower legs bilaterally. Prior to admission the patient had been seen by me and was found to have workup consistent with heart failure, including elevated BNP. I started the patient on loop diuretic and had him follow up with internal medicine. He was experiencing bilateral lower extremity edema which developed infection despite use of keflex. He did complete echocardiogram while hospitalized which identified moderate tricuspid and mitral  regurgitation as well as atrial enlargement bilaterally. Patient is overdue for cardiologist follow up, but son informs me that he is moving patient to [place] so that they can be closer together. The son plans to help the patient to establish with [facility] cardiologist once the move is complete.     Review of Systems   Constitutional, HEENT, cardiovascular, pulmonary, gi and gu systems are negative, except as otherwise noted.      Objective    /76   Pulse 82   Temp 97.8  F (36.6  C) (Temporal)   Wt 69.5 kg (153 lb 2 oz)   SpO2 100%   BMI 27.12 kg/m    Body mass index is 27.12 kg/m .  Physical Exam   GENERAL: healthy, alert and no distress  RESP: lungs clear to auscultation - no rales, rhonchi or wheezes  CV: regular rate and rhythm, normal S1 S2, no S3 or S4, no murmur, click or rub, no peripheral edema and peripheral pulses strong  MS: no gross musculoskeletal defects noted, mild edema in lower legs bilaterally   SKIN: poorly defined erythema along the medial right lower leg and lateral left lower leg, warm to touch and tender  PSYCH: mentation appears normal, affect normal/bright  Diabetic foot exam: onychomycosis    Results for orders placed or performed in visit on 04/11/22 (from the past 24 hour(s))   INR point of care   Result Value Ref Range    INR 2.0 (H) 0.9 - 1.1    Narrative    This test is intended for monitoring Coumadin therapy. Results are not accurate in patients with prolonged INR due to factor deficiency.     No results found.

## 2022-04-15 ENCOUNTER — LAB (OUTPATIENT)
Dept: LAB | Facility: CLINIC | Age: 87
End: 2022-04-15
Payer: COMMERCIAL

## 2022-04-15 ENCOUNTER — ANTICOAGULATION THERAPY VISIT (OUTPATIENT)
Dept: ANTICOAGULATION | Facility: CLINIC | Age: 87
End: 2022-04-15

## 2022-04-15 DIAGNOSIS — Z79.01 LONG TERM CURRENT USE OF ANTICOAGULANT THERAPY: ICD-10-CM

## 2022-04-15 DIAGNOSIS — I48.91 ATRIAL FIBRILLATION (H): ICD-10-CM

## 2022-04-15 DIAGNOSIS — I48.91 ATRIAL FIBRILLATION, UNSPECIFIED TYPE (H): ICD-10-CM

## 2022-04-15 DIAGNOSIS — I48.19 PERSISTENT ATRIAL FIBRILLATION (H): ICD-10-CM

## 2022-04-15 DIAGNOSIS — I48.91 ATRIAL FIBRILLATION (H): Primary | ICD-10-CM

## 2022-04-15 LAB — INR BLD: 1.7 (ref 0.9–1.1)

## 2022-04-15 PROCEDURE — 85610 PROTHROMBIN TIME: CPT

## 2022-04-15 PROCEDURE — 36416 COLLJ CAPILLARY BLOOD SPEC: CPT

## 2022-04-15 NOTE — PROGRESS NOTES
ANTICOAGULATION MANAGEMENT     Jamal Francis 89 year old male is on warfarin with subtherapeutic INR result. (Goal INR 2.0-3.0)    Recent labs: (last 7 days)     04/15/22  1337   INR 1.7*       ASSESSMENT       Source(s): Chart Review and Patient/Caregiver Call       Warfarin doses taken: Warfarin taken as instructed    Diet: No new diet changes identified    New illness, injury, or hospitalization: Yes: Pt has cellulitis of LE    Medication/supplement changes: On doxy that started 4/11- 4/21 which can raise INR    Signs or symptoms of bleeding or clotting: No    Previous INR: Therapeutic last visit; previously outside of goal range    Additional findings: None       PLAN     Recommended plan for temporary change(s) affecting INR     Dosing Instructions: decrease your warfarin dose (5.3% change) from maint dose with next INR in 4 days      Summary  As of 4/15/2022    Full warfarin instructions:  5 mg every Mon, Wed, Fri; 7.5 mg all other days   Next INR check:  4/19/2022             Telephone call with  Ela Christensen who verbalizes understanding and agrees to plan and who agrees to plan and repeated back plan correctly    Lab visit scheduled    Education provided: Monitoring for bleeding signs and symptoms and Monitoring for clotting signs and symptoms    Plan made per [facility] anticoagulation protocol    Miladys Bentley RN  Anticoagulation Clinic  4/15/2022    _______________________________________________________________________     Anticoagulation Episode Summary     Current INR goal:  2.0-3.0   TTR:  28.8 % (11.8 mo)   Target end date:  Indefinite   Send INR reminders to:  [facility]    Indications    Atrial fibrillation (H) [I48.91]  Long-term (current) use of anticoagulants [Z79.01] [Z79.01]  Atrial fibrillation  unspecified type (H) [I48.91]  Persistent atrial fibrillation (H) [I48.19]           Comments:           Anticoagulation Care Providers     Provider Role Specialty Phone number    Lucita  Catarino Lawrence DO Referring Internal Medicine [phone]    Vitor Alvarenga PA-C Referring Internal Medicine [phone]

## 2022-04-18 ENCOUNTER — TELEPHONE (OUTPATIENT)
Dept: FAMILY MEDICINE | Facility: CLINIC | Age: 87
End: 2022-04-18
Payer: COMMERCIAL

## 2022-04-18 DIAGNOSIS — I48.19 PERSISTENT ATRIAL FIBRILLATION (H): ICD-10-CM

## 2022-04-18 RX ORDER — WARFARIN SODIUM 2.5 MG/1
TABLET ORAL
Qty: 72 TABLET | Refills: 0 | Status: SHIPPED | OUTPATIENT
Start: 2022-04-18

## 2022-04-18 NOTE — TELEPHONE ENCOUNTER
Reason for Call:  Medication or medication refill:    Do you use a [facility]?  Name of the pharmacy and phone number for the current request:   [facility] [place]    Name of the medication requested:   warfarin ANTICOAGULANT (COUMADIN) 2.5 MG     Other request:     Can we leave a detailed message on this number? YES    Phone number patient can be reached at: Cell number on file:    Telephone Information:   Mobile NONE       Best Time: anytime     Call taken on 4/18/2022 at 2:54 PM by Rachel Zambrano

## 2022-04-20 ENCOUNTER — ANTICOAGULATION THERAPY VISIT (OUTPATIENT)
Dept: ANTICOAGULATION | Facility: CLINIC | Age: 87
End: 2022-04-20

## 2022-04-20 ENCOUNTER — LAB (OUTPATIENT)
Dept: LAB | Facility: CLINIC | Age: 87
End: 2022-04-20
Payer: COMMERCIAL

## 2022-04-20 DIAGNOSIS — I48.91 ATRIAL FIBRILLATION, UNSPECIFIED TYPE (H): ICD-10-CM

## 2022-04-20 DIAGNOSIS — I48.19 PERSISTENT ATRIAL FIBRILLATION (H): ICD-10-CM

## 2022-04-20 DIAGNOSIS — Z79.01 LONG TERM CURRENT USE OF ANTICOAGULANT THERAPY: ICD-10-CM

## 2022-04-20 DIAGNOSIS — I48.91 ATRIAL FIBRILLATION (H): ICD-10-CM

## 2022-04-20 DIAGNOSIS — I48.91 ATRIAL FIBRILLATION, UNSPECIFIED TYPE (H): Primary | ICD-10-CM

## 2022-04-20 LAB — INR BLD: 1.8 (ref 0.9–1.1)

## 2022-04-20 PROCEDURE — 36416 COLLJ CAPILLARY BLOOD SPEC: CPT

## 2022-04-20 PROCEDURE — 85610 PROTHROMBIN TIME: CPT

## 2022-04-20 NOTE — PROGRESS NOTES
ANTICOAGULATION MANAGEMENT     Jamal Francis 89 year old male is on warfarin with subtherapeutic INR result. (Goal INR 2.0-3.0)    Recent labs: (last 7 days)     04/20/22  1543   INR 1.8*       ASSESSMENT       Source(s): Chart Review    Previous INR was Subtherapeutic    Medication, diet, health changes since last INR doxycycline 4/11-4/21            PLAN     Recommended plan for temporary change(s) affecting INR     Dosing Instructions: Increase your warfarin dose (5.6% change) with next INR in 1 week       Summary  As of 4/20/2022    Full warfarin instructions:  5 mg every Mon, Fri; 7.5 mg all other days   Next INR check:  4/27/2022             Detailed voice message left for  mari Christensen  with dosing instructions and follow up date.     Contact [phone]  to schedule and with any changes, questions or concerns.     Education provided: Please call back if any changes to your diet, medications or how you've been taking warfarin, Importance of therapeutic range and Potential interaction between warfarin and doxy    Plan made per [facility] anticoagulation protocol    Livia Cassidy, RN  Anticoagulation Clinic  4/20/2022    _______________________________________________________________________     Anticoagulation Episode Summary     Current INR goal:  2.0-3.0   TTR:  28.1 % (11.8 mo)   Target end date:  Indefinite   Send INR reminders to:  ZAY ARCENIO    Indications    Atrial fibrillation (H) [I48.91]  Long-term (current) use of anticoagulants [Z79.01] [Z79.01]  Atrial fibrillation  unspecified type (H) [I48.91]  Persistent atrial fibrillation (H) [I48.19]           Comments:           Anticoagulation Care Providers     Provider Role Specialty Phone number    Catarino Landrum DO Referring Internal Medicine [phone]    Vitor Alvarenga PA-C Referring Internal Medicine [phone]

## 2022-04-28 ENCOUNTER — TELEPHONE (OUTPATIENT)
Dept: ANTICOAGULATION | Facility: CLINIC | Age: 87
End: 2022-04-28
Payer: COMMERCIAL

## 2022-04-29 ENCOUNTER — TELEPHONE (OUTPATIENT)
Dept: FAMILY MEDICINE | Facility: CLINIC | Age: 87
End: 2022-04-29
Payer: COMMERCIAL

## 2022-04-29 DIAGNOSIS — I48.19 PERSISTENT ATRIAL FIBRILLATION (H): ICD-10-CM

## 2022-04-29 DIAGNOSIS — I48.91 ATRIAL FIBRILLATION, UNSPECIFIED TYPE (H): ICD-10-CM

## 2022-04-29 DIAGNOSIS — Z79.01 LONG TERM CURRENT USE OF ANTICOAGULANT THERAPY: ICD-10-CM

## 2022-04-29 DIAGNOSIS — I48.91 ATRIAL FIBRILLATION (H): Primary | ICD-10-CM

## 2022-04-29 NOTE — TELEPHONE ENCOUNTER
Reason for Call:  Other Update    Detailed comments: Patient's Granddaughter Amparo called in to report that patient has switched his INR care to [facility]. She [facility] be [facility] so they weren't worrying about him.    Phone Number Patient can be reached at: Cell number on file:    Telephone Information:   Mobile: Amparo- [phone]       Best Time: Any    Can we leave a detailed message on this number? YES    Call taken on 4/29/2022 at 3:47 PM by Tonja Victoria

## 2022-04-29 NOTE — TELEPHONE ENCOUNTER
Will remove patient from anticoagulation monitoring at this time since he is being managed by [facility].

## 2022-05-11 DIAGNOSIS — R60.0 PERIPHERAL EDEMA: Primary | ICD-10-CM

## 2022-05-11 DIAGNOSIS — I48.19 PERSISTENT ATRIAL FIBRILLATION (H): ICD-10-CM

## 2022-05-11 RX ORDER — DILTIAZEM HYDROCHLORIDE 30 MG/1
30 TABLET, FILM COATED ORAL DAILY
Qty: 30 TABLET | Refills: 0 | Status: SHIPPED | OUTPATIENT
Start: 2022-05-11

## 2022-05-11 NOTE — TELEPHONE ENCOUNTER
Routing refill request to provider for review/approval because:  Labs out of range:  Creatinine

## 2022-05-12 RX ORDER — POTASSIUM CHLORIDE 750 MG/1
TABLET, EXTENDED RELEASE ORAL
Qty: 60 TABLET | Refills: 0 | OUTPATIENT
Start: 2022-05-12

## 2022-05-12 NOTE — TELEPHONE ENCOUNTER
Routing refill request to provider for review/approval because:  Drug not active on patient's medication list      Jose DewittRn

## 2022-05-24 DIAGNOSIS — I48.19 PERSISTENT ATRIAL FIBRILLATION (H): ICD-10-CM

## 2022-05-24 RX ORDER — WARFARIN SODIUM 2.5 MG/1
TABLET ORAL
Qty: 72 TABLET | Refills: 0 | OUTPATIENT
Start: 2022-05-24

## 2022-06-29 DIAGNOSIS — I48.20 CHRONIC ATRIAL FIBRILLATION (H): ICD-10-CM

## 2022-06-29 DIAGNOSIS — J43.9 PULMONARY EMPHYSEMA, UNSPECIFIED EMPHYSEMA TYPE (H): ICD-10-CM

## 2022-07-01 RX ORDER — ALBUTEROL SULFATE 90 UG/1
AEROSOL, METERED RESPIRATORY (INHALATION)
Qty: 18 G | Refills: 1 | Status: SHIPPED | OUTPATIENT
Start: 2022-07-01

## 2022-07-01 NOTE — TELEPHONE ENCOUNTER
Prescription approved per [facility] Refill Protocol.    Mattie Mike, BSN, RN

## 2022-10-09 ENCOUNTER — HEALTH MAINTENANCE LETTER (OUTPATIENT)
Age: 87
End: 2022-10-09

## 2024-02-27 ENCOUNTER — TELEPHONE (OUTPATIENT)
Dept: FAMILY MEDICINE | Facility: OTHER | Age: 89
End: 2024-02-27
Payer: COMMERCIAL

## 2024-02-27 NOTE — TELEPHONE ENCOUNTER
Patient Quality Outreach    Patient is due for the following:   Physical Annual Wellness Visit    Next Steps:   Schedule a Annual Wellness Visit    Type of outreach:    Sent Kamelio message.      Questions for provider review:    None           Elicia Rodriguez, CMA

## 2024-03-16 ENCOUNTER — HEALTH MAINTENANCE LETTER (OUTPATIENT)
Age: 89
End: 2024-03-16

## 2024-06-14 NOTE — PROGRESS NOTES
ANTICOAGULATION MANAGEMENT     Jamal Francis 89 year old male is on warfarin with subtherapeutic INR result. (Goal INR 2.0-3.0)    Recent labs: (last 7 days)     10/25/21  1136   INR 1.3*       ASSESSMENT     Source(s): Chart Review and Patient/Caregiver Call       Warfarin doses taken: Warfarin recently held for high INR which may be affecting INR    Diet: Pt now in assisted living so more of a stable diet may be affecting diet and INR    New illness, injury, or hospitalization: No    Medication/supplement changes: None noted    Signs or symptoms of bleeding or clotting: No    Previous INR: Supratherapeutic    Additional findings: None     PLAN     Recommended plan for ongoing change(s) affecting INR     Dosing Instructions:  Decrease your warfarin dose (6.2% change) with next INR in 1 week       Summary  As of 10/25/2021    Full warfarin instructions:  7.5 mg every Tue; 5 mg all other days   Next INR check:  11/1/2021             Telephone call with  Son Demar who verbalizes understanding and agrees to plan and who agrees to plan and repeated back plan correctly    Lab visit scheduled    Education provided: Please call back if any changes to your diet, medications or how you've been taking warfarin, Importance of consistent vitamin K intake, Goal range and significance of current result, Importance of therapeutic range, Importance of following up at instructed interval and Importance of taking warfarin as instructed    Plan made per [facility] anticoagulation protocol    Marilee Feliciano RN  Anticoagulation Clinic  10/25/2021    _______________________________________________________________________     Anticoagulation Episode Summary     Current INR goal:  2.0-3.0   TTR:  24.8 % (9.6 mo)   Target end date:  Indefinite   Send INR reminders to:  [facility]    Indications    Atrial fibrillation (H) [I48.91]  Long-term (current) use of anticoagulants [Z79.01] [Z79.01]  Atrial fibrillation  unspecified type (H)  [I48.91]           Comments:  5 mg tablets, AM dose           Anticoagulation Care Providers     Provider Role Specialty Phone number    Catarino Landrum DO [facility] Internal Medicine [phone]    Vitor Alvarenga PA-C [facility] Internal Medicine [phone]             Statement Selected

## 2025-03-22 ENCOUNTER — HEALTH MAINTENANCE LETTER (OUTPATIENT)
Age: OVER 89
End: 2025-03-22